# Patient Record
Sex: MALE | Race: WHITE
[De-identification: names, ages, dates, MRNs, and addresses within clinical notes are randomized per-mention and may not be internally consistent; named-entity substitution may affect disease eponyms.]

---

## 2020-07-21 ENCOUNTER — HOSPITAL ENCOUNTER (OUTPATIENT)
Dept: HOSPITAL 47 - EC | Age: 70
Setting detail: OBSERVATION
LOS: 2 days | Discharge: HOME | End: 2020-07-23
Attending: HOSPITALIST | Admitting: HOSPITALIST
Payer: MEDICARE

## 2020-07-21 DIAGNOSIS — I25.2: ICD-10-CM

## 2020-07-21 DIAGNOSIS — Z87.891: ICD-10-CM

## 2020-07-21 DIAGNOSIS — Z20.828: ICD-10-CM

## 2020-07-21 DIAGNOSIS — I25.10: ICD-10-CM

## 2020-07-21 DIAGNOSIS — I10: ICD-10-CM

## 2020-07-21 DIAGNOSIS — K21.9: ICD-10-CM

## 2020-07-21 DIAGNOSIS — E66.01: ICD-10-CM

## 2020-07-21 DIAGNOSIS — R91.8: ICD-10-CM

## 2020-07-21 DIAGNOSIS — E86.0: ICD-10-CM

## 2020-07-21 DIAGNOSIS — R91.1: ICD-10-CM

## 2020-07-21 DIAGNOSIS — R11.0: ICD-10-CM

## 2020-07-21 DIAGNOSIS — R42: Primary | ICD-10-CM

## 2020-07-21 DIAGNOSIS — I26.99: ICD-10-CM

## 2020-07-21 DIAGNOSIS — Z90.49: ICD-10-CM

## 2020-07-21 DIAGNOSIS — Z79.899: ICD-10-CM

## 2020-07-21 DIAGNOSIS — R61: ICD-10-CM

## 2020-07-21 DIAGNOSIS — Z79.82: ICD-10-CM

## 2020-07-21 DIAGNOSIS — R55: ICD-10-CM

## 2020-07-21 DIAGNOSIS — M10.9: ICD-10-CM

## 2020-07-21 LAB
ALBUMIN SERPL-MCNC: 4.3 G/DL (ref 3.5–5)
ALP SERPL-CCNC: 101 U/L (ref 38–126)
ALT SERPL-CCNC: 38 U/L (ref 4–49)
ANION GAP SERPL CALC-SCNC: 11 MMOL/L
APTT BLD: 22.7 SEC (ref 22–30)
AST SERPL-CCNC: 36 U/L (ref 17–59)
BASOPHILS # BLD AUTO: 0.1 K/UL (ref 0–0.2)
BASOPHILS NFR BLD AUTO: 1 %
BUN SERPL-SCNC: 11 MG/DL (ref 9–20)
CALCIUM SPEC-MCNC: 9 MG/DL (ref 8.4–10.2)
CHLORIDE SERPL-SCNC: 105 MMOL/L (ref 98–107)
CO2 SERPL-SCNC: 23 MMOL/L (ref 22–30)
EOSINOPHIL # BLD AUTO: 0.2 K/UL (ref 0–0.7)
EOSINOPHIL NFR BLD AUTO: 3 %
ERYTHROCYTE [DISTWIDTH] IN BLOOD BY AUTOMATED COUNT: 4.57 M/UL (ref 4.3–5.9)
ERYTHROCYTE [DISTWIDTH] IN BLOOD: 12.8 % (ref 11.5–15.5)
GLUCOSE SERPL-MCNC: 142 MG/DL (ref 74–99)
HCT VFR BLD AUTO: 42.6 % (ref 39–53)
HGB BLD-MCNC: 14.5 GM/DL (ref 13–17.5)
INR PPP: 1 (ref ?–1.2)
LYMPHOCYTES # SPEC AUTO: 2.7 K/UL (ref 1–4.8)
LYMPHOCYTES NFR SPEC AUTO: 36 %
MAGNESIUM SPEC-SCNC: 2 MG/DL (ref 1.6–2.3)
MCH RBC QN AUTO: 31.7 PG (ref 25–35)
MCHC RBC AUTO-ENTMCNC: 34 G/DL (ref 31–37)
MCV RBC AUTO: 93.2 FL (ref 80–100)
MONOCYTES # BLD AUTO: 0.6 K/UL (ref 0–1)
MONOCYTES NFR BLD AUTO: 8 %
NEUTROPHILS # BLD AUTO: 3.7 K/UL (ref 1.3–7.7)
NEUTROPHILS NFR BLD AUTO: 49 %
PH UR: 7 [PH] (ref 5–8)
PLATELET # BLD AUTO: 371 K/UL (ref 150–450)
POTASSIUM SERPL-SCNC: 4.5 MMOL/L (ref 3.5–5.1)
PROT SERPL-MCNC: 7.1 G/DL (ref 6.3–8.2)
PT BLD: 10 SEC (ref 9–12)
SODIUM SERPL-SCNC: 139 MMOL/L (ref 137–145)
SP GR UR: 1.01 (ref 1–1.03)
UROBILINOGEN UR QL STRIP: <2 MG/DL (ref ?–2)
WBC # BLD AUTO: 7.6 K/UL (ref 3.8–10.6)

## 2020-07-21 PROCEDURE — 36415 COLL VENOUS BLD VENIPUNCTURE: CPT

## 2020-07-21 PROCEDURE — 86140 C-REACTIVE PROTEIN: CPT

## 2020-07-21 PROCEDURE — 70450 CT HEAD/BRAIN W/O DYE: CPT

## 2020-07-21 PROCEDURE — 85025 COMPLETE CBC W/AUTO DIFF WBC: CPT

## 2020-07-21 PROCEDURE — 82550 ASSAY OF CK (CPK): CPT

## 2020-07-21 PROCEDURE — 71046 X-RAY EXAM CHEST 2 VIEWS: CPT

## 2020-07-21 PROCEDURE — 83615 LACTATE (LD) (LDH) ENZYME: CPT

## 2020-07-21 PROCEDURE — 93970 EXTREMITY STUDY: CPT

## 2020-07-21 PROCEDURE — 83735 ASSAY OF MAGNESIUM: CPT

## 2020-07-21 PROCEDURE — 96361 HYDRATE IV INFUSION ADD-ON: CPT

## 2020-07-21 PROCEDURE — 83690 ASSAY OF LIPASE: CPT

## 2020-07-21 PROCEDURE — 80053 COMPREHEN METABOLIC PANEL: CPT

## 2020-07-21 PROCEDURE — 85610 PROTHROMBIN TIME: CPT

## 2020-07-21 PROCEDURE — 82150 ASSAY OF AMYLASE: CPT

## 2020-07-21 PROCEDURE — 85652 RBC SED RATE AUTOMATED: CPT

## 2020-07-21 PROCEDURE — 96376 TX/PRO/DX INJ SAME DRUG ADON: CPT

## 2020-07-21 PROCEDURE — 84550 ASSAY OF BLOOD/URIC ACID: CPT

## 2020-07-21 PROCEDURE — 81003 URINALYSIS AUTO W/O SCOPE: CPT

## 2020-07-21 PROCEDURE — 96374 THER/PROPH/DIAG INJ IV PUSH: CPT

## 2020-07-21 PROCEDURE — 93880 EXTRACRANIAL BILAT STUDY: CPT

## 2020-07-21 PROCEDURE — 85379 FIBRIN DEGRADATION QUANT: CPT

## 2020-07-21 PROCEDURE — 93306 TTE W/DOPPLER COMPLETE: CPT

## 2020-07-21 PROCEDURE — 93005 ELECTROCARDIOGRAM TRACING: CPT

## 2020-07-21 PROCEDURE — 85730 THROMBOPLASTIN TIME PARTIAL: CPT

## 2020-07-21 PROCEDURE — 71275 CT ANGIOGRAPHY CHEST: CPT

## 2020-07-21 PROCEDURE — 84484 ASSAY OF TROPONIN QUANT: CPT

## 2020-07-21 PROCEDURE — 96372 THER/PROPH/DIAG INJ SC/IM: CPT

## 2020-07-21 PROCEDURE — 99285 EMERGENCY DEPT VISIT HI MDM: CPT

## 2020-07-21 PROCEDURE — 80048 BASIC METABOLIC PNL TOTAL CA: CPT

## 2020-07-21 NOTE — CT
EXAMINATION TYPE: CT brain wo con

 

DATE OF EXAM: 7/21/2020

 

COMPARISON: None

 

HISTORY: Dizziness and nausea.

 

CT DLP: 1098.4 mGycm

Automated exposure control for dose reduction was used.

 

There is cerebral cortical atrophy. There is no mass effect nor midline shift. There is no sign of in
tracranial hemorrhage. The calvarium is intact. There is no evidence of cerebral edema.

 

IMPRESSION:

Cerebral atrophy. No acute intracranial abnormality.

## 2020-07-21 NOTE — ED
General Adult HPI





- General


Chief complaint: Dizziness


Stated complaint: Dizziness,Nausea


Time Seen by Provider: 07/21/20 19:05


Source: patient, EMS, RN notes reviewed, old records reviewed


Mode of arrival: EMS


Limitations: no limitations





- History of Present Illness


Initial comments: 





This is a 70-year-old male who comes in stating he's been dizzy for the last 

couple weeks.  Patient states he is unsteady wasn't sure if it was going to pass

out.  Patient states he was recently admitted the hospital for heat exhaustion. 

Patient states he went home still hasn't been feeling well followed up with his 

doctor and continues to be dizzy and became dizzy and very nauseated.  Patient 

states he had no pain at any time.  Patient states he has had a couple episodes 

of significant diaphoresis.  Patient denies a headache.  Patient denies numbness

weakness.  Patient denies chest pain palpitations difficulty breathing first 

breath.  Patient denies abdominal pain.  Patient denies vomiting or diarrhea.  

Patient denies any recent fever chills or cough.  Patient denies any swelling to

the legs or calf tenderness.  Patient states while lying in bed it doesn't seem 

like he is that dizzy but when he sits up it does get dizzy 





- Related Data


                                    Allergies











Allergy/AdvReac Type Severity Reaction Status Date / Time


 


No Known Allergies Allergy   Verified 07/21/20 20:34














Review of Systems


ROS Statement: 


Those systems with pertinent positive or pertinent negative responses have been 

documented in the HPI.





ROS Other: All systems not noted in ROS Statement are negative.





Past Medical History


Past Medical History: Hypertension, Myocardial Infarction (MI)


History of Any Multi-Drug Resistant Organisms: None Reported


Past Surgical History: Appendectomy


Past Psychological History: No Psychological Hx Reported


Smoking Status: Former smoker


Past Alcohol Use History: None Reported


Past Drug Use History: None Reported





General Exam





- General Exam Comments


Initial Comments: 





GENERAL:


Patient is well-developed and well-nourished.  Patient is nontoxic and well-

hydrated and is in mild distress.





ENT:


Neck is soft and supple.  No significant lymphadenopathy is noted.  Oropharynx 

is clear.  Moist mucous membranes.  Neck has full range of motion without 

eliciting any pain.  





EYES:


The sclera were anicteric and conjunctiva were pink and moist.  Extraocular 

movements were intact and pupils were equal round and reactive to light.  

Eyelids were unremarkable.





PULMONARY:


Unlabored respirations.  Good breath sounds bilaterally.  No audible rales 

rhonchi or wheezing was noted.





CARDIOVASCULAR:


There is a regular rate and rhythm without any murmurs gallops or rubs.  





ABDOMEN:


Soft and nontender with normal bowel sounds.  





SKIN:


Skin is clear with no lesions or rashes and otherwise unremarkable.





NEUROLOGIC:


Patient is alert and oriented x3.  Cranial nerves II through XII are grossly 

intact.  Motor and sensory are also intact.  Normal speech, volume and content. 

Symmetrical smile.  





MUSCULOSKELETAL:


Normal extremities with adequate strength and full range of motion.  





LYMPHATICS:


No significant lymphadenopathy is noted





PSYCHIATRIC:


Normal psychiatric evaluation. 








Limitations: no limitations





Course


                                   Vital Signs











  07/21/20





  19:03


 


Temperature 98.8 F


 


Pulse Rate 78


 


Respiratory 18





Rate 


 


Blood Pressure 152/92


 


O2 Sat by Pulse 91 L





Oximetry 














Medical Decision Making





- Medical Decision Making





EKG shows a normal sinus rhythm at 76 bpm WV interval is on a 52 QRS is 90 QT 

intervals 46 QTC is 456.  There is no ST segment elevation or depression.





CT of the brain shows no acute normalities.  Chest x-ray shows no acute 

abnormality.





- Lab Data


Result diagrams: 


                                 07/21/20 19:27





                                 07/21/20 19:27


                                   Lab Results











  07/21/20 07/21/20 07/21/20 Range/Units





  19:27 19:27 19:27 


 


WBC  7.6    (3.8-10.6)  k/uL


 


RBC  4.57    (4.30-5.90)  m/uL


 


Hgb  14.5    (13.0-17.5)  gm/dL


 


Hct  42.6    (39.0-53.0)  %


 


MCV  93.2    (80.0-100.0)  fL


 


MCH  31.7    (25.0-35.0)  pg


 


MCHC  34.0    (31.0-37.0)  g/dL


 


RDW  12.8    (11.5-15.5)  %


 


Plt Count  371    (150-450)  k/uL


 


Neutrophils %  49    %


 


Lymphocytes %  36    %


 


Monocytes %  8    %


 


Eosinophils %  3    %


 


Basophils %  1    %


 


Neutrophils #  3.7    (1.3-7.7)  k/uL


 


Lymphocytes #  2.7    (1.0-4.8)  k/uL


 


Monocytes #  0.6    (0-1.0)  k/uL


 


Eosinophils #  0.2    (0-0.7)  k/uL


 


Basophils #  0.1    (0-0.2)  k/uL


 


PT   10.0   (9.0-12.0)  sec


 


INR   1.0   (<1.2)  


 


APTT   22.7   (22.0-30.0)  sec


 


Sodium    139  (137-145)  mmol/L


 


Potassium    4.5  (3.5-5.1)  mmol/L


 


Chloride    105  ()  mmol/L


 


Carbon Dioxide    23  (22-30)  mmol/L


 


Anion Gap    11  mmol/L


 


BUN    11  (9-20)  mg/dL


 


Creatinine    0.89  (0.66-1.25)  mg/dL


 


Est GFR (CKD-EPI)AfAm    >90  (>60 ml/min/1.73 sqM)  


 


Est GFR (CKD-EPI)NonAf    87  (>60 ml/min/1.73 sqM)  


 


Glucose    142 H  (74-99)  mg/dL


 


Calcium    9.0  (8.4-10.2)  mg/dL


 


Magnesium    2.0  (1.6-2.3)  mg/dL


 


Total Bilirubin    0.5  (0.2-1.3)  mg/dL


 


AST    36  (17-59)  U/L


 


ALT    38  (4-49)  U/L


 


Alkaline Phosphatase    101  ()  U/L


 


Troponin I     (0.000-0.034)  ng/mL


 


Total Protein    7.1  (6.3-8.2)  g/dL


 


Albumin    4.3  (3.5-5.0)  g/dL














  07/21/20 Range/Units





  19:27 


 


WBC   (3.8-10.6)  k/uL


 


RBC   (4.30-5.90)  m/uL


 


Hgb   (13.0-17.5)  gm/dL


 


Hct   (39.0-53.0)  %


 


MCV   (80.0-100.0)  fL


 


MCH   (25.0-35.0)  pg


 


MCHC   (31.0-37.0)  g/dL


 


RDW   (11.5-15.5)  %


 


Plt Count   (150-450)  k/uL


 


Neutrophils %   %


 


Lymphocytes %   %


 


Monocytes %   %


 


Eosinophils %   %


 


Basophils %   %


 


Neutrophils #   (1.3-7.7)  k/uL


 


Lymphocytes #   (1.0-4.8)  k/uL


 


Monocytes #   (0-1.0)  k/uL


 


Eosinophils #   (0-0.7)  k/uL


 


Basophils #   (0-0.2)  k/uL


 


PT   (9.0-12.0)  sec


 


INR   (<1.2)  


 


APTT   (22.0-30.0)  sec


 


Sodium   (137-145)  mmol/L


 


Potassium   (3.5-5.1)  mmol/L


 


Chloride   ()  mmol/L


 


Carbon Dioxide   (22-30)  mmol/L


 


Anion Gap   mmol/L


 


BUN   (9-20)  mg/dL


 


Creatinine   (0.66-1.25)  mg/dL


 


Est GFR (CKD-EPI)AfAm   (>60 ml/min/1.73 sqM)  


 


Est GFR (CKD-EPI)NonAf   (>60 ml/min/1.73 sqM)  


 


Glucose   (74-99)  mg/dL


 


Calcium   (8.4-10.2)  mg/dL


 


Magnesium   (1.6-2.3)  mg/dL


 


Total Bilirubin   (0.2-1.3)  mg/dL


 


AST   (17-59)  U/L


 


ALT   (4-49)  U/L


 


Alkaline Phosphatase   ()  U/L


 


Troponin I  <0.012  (0.000-0.034)  ng/mL


 


Total Protein   (6.3-8.2)  g/dL


 


Albumin   (3.5-5.0)  g/dL














Disposition


Clinical Impression: 


 Near syncope, Diaphoresis





Disposition: ADMITTED AS IP TO THIS HOSP


Referrals: 


Nadine Stovall DO [Primary Care Provider] - 1-2 days


Time of Disposition: 20:31

## 2020-07-21 NOTE — XR
EXAMINATION TYPE: XR chest 2V

 

DATE OF EXAM: 7/21/2020

 

COMPARISON: NONE

 

HISTORY: Dizziness and nausea

 

TECHNIQUE:

 

FINDINGS: Heart and mediastinum are normal. There is some linear density in the left midlung. This is
 probably calcified cartilage. The other lung fields are clear. There are no hilar masses. There is n
o pulmonary consolidation. There is no pleural effusion. Bony thorax is intact..

 

IMPRESSION: No active cardiopulmonary disease. Normal heart.

## 2020-07-22 LAB
AMYLASE SERPL-CCNC: 49 U/L (ref 30–110)
ANION GAP SERPL CALC-SCNC: 8 MMOL/L
BASOPHILS # BLD AUTO: 0.1 K/UL (ref 0–0.2)
BASOPHILS NFR BLD AUTO: 1 %
BUN SERPL-SCNC: 12 MG/DL (ref 9–20)
CALCIUM SPEC-MCNC: 8.8 MG/DL (ref 8.4–10.2)
CHLORIDE SERPL-SCNC: 104 MMOL/L (ref 98–107)
CK SERPL-CCNC: 51 U/L (ref 55–170)
CO2 SERPL-SCNC: 25 MMOL/L (ref 22–30)
EOSINOPHIL # BLD AUTO: 0.2 K/UL (ref 0–0.7)
EOSINOPHIL NFR BLD AUTO: 3 %
ERYTHROCYTE [DISTWIDTH] IN BLOOD BY AUTOMATED COUNT: 4.35 M/UL (ref 4.3–5.9)
ERYTHROCYTE [DISTWIDTH] IN BLOOD: 12.8 % (ref 11.5–15.5)
GLUCOSE SERPL-MCNC: 172 MG/DL (ref 74–99)
HCT VFR BLD AUTO: 42 % (ref 39–53)
HGB BLD-MCNC: 13.8 GM/DL (ref 13–17.5)
LDH SPEC-CCNC: 447 U/L (ref 313–618)
LYMPHOCYTES # SPEC AUTO: 1.9 K/UL (ref 1–4.8)
LYMPHOCYTES NFR SPEC AUTO: 33 %
MCH RBC QN AUTO: 31.8 PG (ref 25–35)
MCHC RBC AUTO-ENTMCNC: 32.9 G/DL (ref 31–37)
MCV RBC AUTO: 96.5 FL (ref 80–100)
MONOCYTES # BLD AUTO: 0.5 K/UL (ref 0–1)
MONOCYTES NFR BLD AUTO: 9 %
NEUTROPHILS # BLD AUTO: 3.1 K/UL (ref 1.3–7.7)
NEUTROPHILS NFR BLD AUTO: 52 %
PLATELET # BLD AUTO: 339 K/UL (ref 150–450)
POTASSIUM SERPL-SCNC: 4.3 MMOL/L (ref 3.5–5.1)
SODIUM SERPL-SCNC: 137 MMOL/L (ref 137–145)
URATE SERPL-MCNC: 4.8 MG/DL (ref 3.5–8.5)
WBC # BLD AUTO: 5.8 K/UL (ref 3.8–10.6)

## 2020-07-22 RX ADMIN — PANTOPRAZOLE SODIUM SCH MG: 40 INJECTION, POWDER, FOR SOLUTION INTRAVENOUS at 00:09

## 2020-07-22 RX ADMIN — RIVAROXABAN SCH MG: 15 TABLET, FILM COATED ORAL at 14:18

## 2020-07-22 RX ADMIN — ASPIRIN 81 MG CHEWABLE TABLET SCH MG: 81 TABLET CHEWABLE at 10:46

## 2020-07-22 RX ADMIN — CEFAZOLIN SCH MLS/HR: 330 INJECTION, POWDER, FOR SOLUTION INTRAMUSCULAR; INTRAVENOUS at 10:46

## 2020-07-22 RX ADMIN — PANTOPRAZOLE SODIUM SCH MG: 40 INJECTION, POWDER, FOR SOLUTION INTRAVENOUS at 10:46

## 2020-07-22 RX ADMIN — RIVAROXABAN SCH: 15 TABLET, FILM COATED ORAL at 21:29

## 2020-07-22 RX ADMIN — CEFAZOLIN SCH MLS/HR: 330 INJECTION, POWDER, FOR SOLUTION INTRAMUSCULAR; INTRAVENOUS at 21:35

## 2020-07-22 RX ADMIN — CEFAZOLIN SCH MLS/HR: 330 INJECTION, POWDER, FOR SOLUTION INTRAMUSCULAR; INTRAVENOUS at 00:08

## 2020-07-22 NOTE — P.CNNES
History of Present Illness


Consult date: 07/22/20


Requesting physician: Dougie Crook


Reason for Consult: Near syncope


History of Present Illness: 





Patient is a 70-year-old male who came to the hospital because he has been 

feeling dizzy for the last couple weeks.  Patient states that about couple weeks

ago he was overheated.  He did not seek any medical attention for that.  He is a

, was on the road for 10 days.  Since that episode of overheating, 

he has not been feeling well, felt woozy, felt "blah", and as if would blackout.

 Also has been feeling nauseous.  As his symptoms persisted, he decided to come 

to the ER.  His blood pressure on arrival was 152/92, pulse rate 78, temperature

98.8.





Patient underwent CT head showed cerebral atrophy with no acute intracranial 

abnormality.  Chest x-ray showed no active cardiopulmonary disease.  Normal 

heart.  2-D echo showed sinus rhythm, morbid obesity.  Normal liver left-

ventricular size.  Mild concentric LVH.  EF is 50-55%.  Right ventricle is 

normal in size.  Left atrial size is normal.  Aortic valve was not well-

visualized.  Carotid Doppler showed bilateral intimal thickening with no 

significant hemodynamic stenosis.  Antegrade flow in both vertebral arteries.  

CT of the chest showed a filling defect within the left upper lobe pulmonary 

arterial branches suspicious for a small pulmonary embolism.  There is a mass in

the right upper lobe suspicious for malignancy 1.4 cm.  Recommend PET scan in 

pulmonary consultation.  I'd hilar adenopathy noted.  Additional 2 mm no 

unilateral segment right upper lobe.  COPD.  Venous Doppler of bilateral lower 

limbs negative for DVT.  EKG shows normal sinus rhythm with left axis deviation.

 Patient has been seen by pulmonary, who are recommending Xarelto for PE and 

also recommending outpatient PET scan for lung mass.





Patient denies diabetes.  He has hypertension.  He has smoked 1 pack per day for

10-15 years, quit 34 years ago.





Review of Systems





Completely unremarkable except as mentioned above.  All 14 point of review of 

systems unremarkable.  Patient denies any slurred speech facial droop, double 

vision, loss of vision or blurred vision.  Denies any focal numbness tingling, 

focal weakness.  No syncopal spell.  No seizure.





Past Medical History


Past Medical History: Hypertension, Myocardial Infarction (MI)


Additional Past Medical History / Comment(s): Gout


Last Myocardial Infarction Date:: 7\21\2014


History of Any Multi-Drug Resistant Organisms: None Reported


Past Surgical History: Appendectomy


Additional Past Surgical History / Comment(s): pt states appendectomy 30-40 

years ago.  pt states he doesn't know when his last MI was, because he was in 

retirement at the time he also said it was about 6 years ago.


Past Psychological History: No Psychological Hx Reported


Smoking Status: Former smoker


Past Alcohol Use History: None Reported


Past Drug Use History: None Reported





Medications and Allergies


                                Home Medications











 Medication  Instructions  Recorded  Confirmed  Type


 


Allopurinol [Zyloprim] 300 mg PO DAILY 07/21/20 07/21/20 History


 


Aspirin EC [Ecotrin Low Dose] 81 mg PO DAILY 07/21/20 07/21/20 History


 


Omeprazole Magnesium [PriLOSEC OTC] 20 mg PO DAILY 07/21/20 07/21/20 History


 


amLODIPine [Norvasc] 5 mg PO DAILY 07/21/20 07/21/20 History








                                    Allergies











Allergy/AdvReac Type Severity Reaction Status Date / Time


 


No Known Allergies Allergy   Verified 07/21/20 20:34














Physical Examination





- Vital Signs


Vital Signs: 


                                   Vital Signs











  Temp Pulse Pulse Resp BP BP BP


 


 07/22/20 15:00  98.9 F   87  16   149/88 


 


 07/22/20 08:11    74  16   


 


 07/22/20 07:49  98.1 F   74  16   157/91 


 


 07/22/20 03:00    76    


 


 07/22/20 02:29  98 F   76    138/78 


 


 07/21/20 23:23        163/90


 


 07/21/20 22:05  97.5 F L   86    158/92 


 


 07/21/20 21:06  98.4 F  76   18  146/86  


 


 07/21/20 19:03  98.8 F  78   18  152/92  














  BP BP Pulse Ox


 


 07/22/20 15:00    96


 


 07/22/20 08:11   


 


 07/22/20 07:49    98


 


 07/22/20 03:00   


 


 07/22/20 02:29    98


 


 07/21/20 23:23  135/84  151/82 


 


 07/21/20 22:05    97


 


 07/21/20 21:06    98


 


 07/21/20 19:03    91 L








                                Intake and Output











 07/22/20 07/22/20 07/22/20





 06:59 14:59 22:59


 


Output Total 1100  1500


 


Balance -1100  -1500


 


Output:   


 


  Urine 1100  1500


 


Other:   


 


  Voiding Method Toilet Toilet Toilet


 


  # Voids 1  1














On examination patient is an elderly  male, in no acute distress.  Breonna

ent is alert and awake oriented to time place and person.  Speech and language 

functions are normal.  Attention and concentration, fund of knowledge is 

adequate.  On cranial nerve examination pupils are round and reacting to light. 

Visual fields are full.  Extraocular muscles are intact with no nystagmus.  Face

is symmetric, tongue protrudes the midline.  Palatal elevation and sensation 

normal.  Hearing and shoulder shrug normal.  On muscle strength testing there is

no pronator drift and the strength is normal in arms and legs distally and 

proximally.  Reflexes are 1+ and plantars downgoing.  Sensory touch is equal.  

No ataxia for finger-to-nose testing.  Tone and bulk of muscles normal.  Gait 

normal.  No carotid bruit, S1 and S2 audible.  Abdomen soft nontender, chest is 

clear.





Results





- Laboratory Findings


CBC and BMP: 


                                 07/22/20 06:06





                                 07/22/20 06:06


Abnormal Lab Findings: 


                                  Abnormal Labs











  07/21/20 07/22/20 07/22/20





  19:27 06:06 06:06


 


ESR   22 H 


 


D-Dimer   


 


Glucose  142 H   172 H


 


Creatine Kinase    51 L














  07/22/20





  06:06


 


ESR 


 


D-Dimer  1.19 H


 


Glucose 


 


Creatine Kinase 














Assessment and Plan


Assessment: 





* Dizziness, with near syncopal spell.


* Pulmonary embolism


* Lung mass


* Obesity.


Plan: 





* Patient had a normal carotid Doppler and computed tomography scan of the head.

   No further workup indicated.  Patient's neurological examination is normal.


* Patient has been started on Xarelto for PE.


* Patient undergoing placement of event monitor to rule out arrhythmia.


* Neurology will sign off.  Please call neurology if any other concerns.

## 2020-07-22 NOTE — CT
EXAMINATION TYPE: CT angio chest

 

DATE OF EXAM: 7/22/2020 9:12 AM

 

COMPARISON:

 

HISTORY: Elevated d-dimer

 

CT DLP: 544.4 mGycm

Automated exposure control for dose reduction was used.

 

CONTRAST: 

CTA scan of the thorax is performed with IV Contrast, patient injected with 100 mL of Isovue 370, pul
monary embolism protocol. .  

 

FINDINGS:

 

LUNGS: Biapical pleural thickening and diffuse emphysematous changes. There is a suspicious mass in t
he right upper lobe on axial image 45 measuring 1.4 cm. Additional nodule right upper lobe axial imag
e 79 measuring 2 mm no pleural effusion or pneumothorax. No overt failure. Groundglass changes poster
iorly most typical of atelectasis. This

 

MEDIASTINUM: There is pathologic adenopathy in the right hilum measuring short axis of 1.4 cm. Dooley
ry artery calcification is seen in the heart is enlarged. Atherosclerotic change of the aorta but no 
evidence of aneurysm. Trace of pericardial fluid noted.

 

Assessment of the pulmonary arteries is somewhat limited due to artifact. Within the left upper lobe 
branch there is incomplete filling of the pulmonary artery distally suggestive of a small left upper 
lobe pulmonary embolism.

 

OTHER:  Hypertrophic and degenerative change of the spine. No obvious destructive changes. Low attenu
ation in the liver and suggestive of fatty infiltration. There is a small hiatal hernia. Right upper 
pole right renal lesion measures 10 Hounsfield units suggestive of a cyst.

 

 

 

IMPRESSION:

1. There is a filling defect within a left upper lobe pulmonary arterial branch suspicious for a smal
l pulmonary embolism correlate clinically.

2. There is a mass in the right upper lobe suspicious for malignancy measuring 1.4 cm. Recommend PET 
scan and pulmonology consultation. Right hilar adenopathy noted.

3. Additional 2 mm nodule lateral segment right upper lobe.

4. COPD.

## 2020-07-22 NOTE — PN
PROGRESS NOTE



DATE OF SERVICE:

07/22/2020



This 70-year-old gentleman who was admitted with dizziness and nausea was thought to

have some dehydration also.  The patient had multiple evaluations and a 2D echo with

Doppler was done today that showed ejection fraction about 50% to 55% and no other

major abnormalities.  The patient had a carotid Doppler which showed bilateral intimal

thickening with no hemodynamically significant stenosis. A chest CT was also done which

showed evidence of a filling defect in the left upper lobe pulmonary artery branch

suspicious for a small pulmonary embolism and a mass in the right upper lobe suspicious

for malignancy measuring about 1.4 cm. A 2 mm nodule in the lateral segment of the

right upper lobe was also noted. COPD was also re-demonstrated.  A venous study in the

lower legs was done which was negative for DVT.  The patient was also seen by

Cardiology as well as Pulmonology. Dr. Samaniego has recommended PET scan in the

outpatient setting and possibly navigational bronchoscopy and biopsy.  The basic labs:

ESR is 22. D-dimer was 1.19. Uric acid was 4.8, glucose 172. The CRP was only 6.9.

Amylase and lipase were also normal. Troponins are negative. LDH was only 447, which is

within normal limits. Past medical history reviewed.



REVIEW OF SYSTEMS:

CARDIOVASCULAR SYSTEM: No angina, palpitations.

RESPIRATORY SYSTEM: As mentioned earlier.

GI: Nausea is slightly better.

NERVOUS SYSTEM: As mentioned earlier. Still weak.



CURRENT MEDICATIONS:

1. Tylenol.

2. Norco.

3. Zyloprim.

4. Xanax.

5. Norvasc.

6. Aspirin.

7. Protonix.

8. Xarelto.

9. Restoril.



PHYSICAL EXAMINATION:

Patient is alert, oriented x3.  Pulse 75, blood pressure 157/91, respirations 16,

temperature 98.1, pulse ox 98% on room air.

HEENT: Conjunctivae normal.

NECK: No jugular venous distention.

CARDIOVASCULAR SYSTEM:  S1, S2 muffled.

RESPIRATORY SYSTEM: Breath sounds diminished at the bases.  A few scattered rhonchi.

ABDOMEN: Soft, non-tender.

NERVOUS SYSTEM: No focal deficit.



LABS:

CBC within normal limits.  ESR is 22.  D-dimer is 1.19 and glucose is 172. Troponins

are negative. Creatine kinase was normal.  LDH was normal.



ASSESSMENT:

1. Dizziness and nausea of undetermined etiology.

2. Small pulmonary embolism in the left upper lobe, pulmonary artery branch.

3. Mass in the right upper lobe suspicious for malignancy measuring 1.4 cm; in

    addition, a 2 mm nodule in the lateral segment of the right upper lobe.

4. Rule out COVID-19.

5. Hypertension.

6. History of myocardial infarction.

7. History of appendectomy.

8. Remote history of nicotine dependence.

9. Obesity with body mass index of 38.7.

10.Possible dehydration, present on admission.

11.History of recent heat exhaustion.



RECOMMENDATIONS AND DISCUSSION:

In this 70-year-old gentleman who presented with multiple complex medical issues, we

will monitor the patient closely, continue the current medications, continue

symptomatic treatment.  Xarelto has been initiated.  We will continue the IV fluids at

this time.  Repeat labs in the morning.  Increase ambulation.  Otherwise, continue to

follow with multiple consultants, including Neurology, Pulmonology and Cardiology. The

prognosis is guarded because of multiple complex medical issues. Further

recommendations to follow.





MMODL / IJN: [id number] / Job#: [id number]

## 2020-07-22 NOTE — HP
HISTORY AND PHYSICAL



DATE OF SERVICE:

07/21/2020



CHIEF COMPLAINT:

Dizziness and nausea.



HISTORY OF PRESENT ILLNESS:

This 70-year-old gentleman with a past medical history of multiple medical problems

including hypertension, myocardial infarction, appendectomy, remote history of nicotine

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

.  The patient apparently drives out of state.  The patient went to [place].

About 2 weeks ago, the patient had episode of heat exhaustion and was admitted in the

hospital, but subsequently patient also recently went to [place] and went to [facility] and recently came back like 2 days ago.  The patient was feeling more dizzy and

nauseous and was unable to keep anything down and because of weakness and patient also

had a couple episodes of significant diaphoresis, patient came to [facility]

and admitted for further evaluation and treatment.  There is no history of chest pain,

palpitation. No history of cough, sputum.  No history of any contact with any ill

individuals even though patient has been to [place].  The patient reports the patient

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



PAST MEDICAL HISTORY:

Hypertension, myocardial infarction, appendectomy.



MEDICATIONS:

Medications prior to admission include:

1. Norvasc 5 mg p.o. daily.

2. Prilosec 20 mg p.o. daily.

3. Ecotrin 81 mg.

4. Zyloprim 300 mg daily.



ALLERGIES:

None.



FAMILY HISTORY:

No history of heart disease or strokes in the family.



SOCIAL HISTORY:

Previous history of smoking. No history of alcohol intake.



REVIEW OF SYSTEMS:

ENT: No diminished hearing or diminished vision

CARDIOVASCULAR SYSTEM: As mentioned earlier.

RESPIRATORY SYSTEM:  As mentioned earlier.

GI: No nausea.

: No dysuria.

NERVOUS SYSTEM: No numbness or weakness.

ALLERGY/IMMUNOLOGY: No asthma.

MUSCULOSKELETAL: As mentioned earlier.

HEMATOLOGY/ONCOLOGY: No history of anemia.

ENDOCRINE:  No history of diabetes or hypothyroidism.

CONSTITUTIONAL: As mentioned earlier.

DERMATOLOGY: Negative.

RHEUMATOLOGY: Negative.

PSYCHIATRY: As mentioned earlier.



PHYSICAL EXAMINATION:

The patient is alert and oriented x3. Pulse 86, blood pressure is 158/92, respiration

18, temperature 97.4, pulse ox 97% on 2 L.

HEENT:  Conjunctivae normal.

NECK: No jugular venous distention.

CARDIOVASCULAR: S1, S2 muffled.

RESPIRATORY:  Breath sounds diminished at the bases. No rhonchi, no crackles.

ABDOMEN:  Soft, nontender.

LEGS: No edema, no swelling.

NERVOUS SYSTEM: Higher functions as mentioned. Moves all 4 limbs. No focal motor or

sensory deficit.

LYMPHATICS:  No lymphadenopathy of the neck, axillae or groin.

SKIN:  No ulcer, rash or bleeding.

JOINTS: No active deforming arthropathy.



LABS:

CBC within normal limits.  Glucose 142.



ASSESSMENT:

1. Dizziness and nausea for evaluation, rule out acute coronary syndrome.

2. Rule out COVID-19.

3. Hypertension.

4. History of myocardial infarction.

5. History of appendectomy.

6. Remote history of nicotine dependence.

7. Obesity with body mass of 38.7.



RECOMMENDATIONS AND DISCUSSION:

This 70-year-old gentleman who presented with multiple complex medical issues, will

monitor the patient closely. Continue the current medications. Continues symptomatic

treatment. Will obtain cardiology consultation.  Otherwise, IV fluids. COVID-19 as

requested.  Resume the home medications.  Symptomatic treatment.  Prognosis guarded

because of multiple complex medical issues. Further recommendations to follow. A copy

of dictation forwarded to Dr. Stovall, who is the primary physician.





MMODL / IJN: [id number] / Job#: [id number]

## 2020-07-22 NOTE — P.CRDCN
History of Present Illness


History of present illness: 





 


HISTORY OF PRESENTING ILLNESS


This is a pleasant 70-year-old  male past medical history significant 

for coronary artery disease according to the patient he had a cardiac catheteri

zation in [place] about 6 years ago he was told he had a myocardial infarction 

but had collateral circulation.  He also has been diagnosed with hypertension in

the past.  He does not follow regularly in the office with a cardiologist. We 

have been asked to see in consultation for syncope.  He states approximately 2 

weeks ago he had an episode of feeling overheated. He was dizzy and light headed

for no particular reason. Since that time he has been feeling fatigued and 

having persistent dizziness when he changes positions. No chest pain, shortness 

of breath, palpitations, diaphoresis or nausea. Yesterday he had another episode

of feeling dizzy and lightheaded and he changes positions.  He has had no loss 

of consciousness or syncopal episodes.  He works as an over the road truck 

.  He states his symptoms he is experiencing are similar to what he 

experienced 6 years ago when he had a myocardial infarction.





DIAGNOSTICS


EKG reveals sinus mechanism with left axis deviation.


Chest xray negative for an acute cardiopulmonary process.


CT of the brain is negative for an acute intracranial process.


Laboratory reviewed, CBC unremarkable, d-dimer 1.19, sodium 137, potassium 4.3, 

creatinine 0.97, magnesium 2.0, cardiac enzymes negative 3.


Current cardiac medications include amlodipine 5 mg daily and aspirin 81 mg 

daily. 





REVIEW OF SYSTEMS


At the time of my exam:


CONSTITUTIONAL: Denies fever or chills.


CARDIOVASCULAR: Denies chest pain, shortness of breath, orthopnea, PND or 

palpitations.


RESPIRATORY: Denies cough. 


GASTROINTESTINAL: Denies abdominal pain, diarrhea, constipation, nausea or 

vomiting.


MUSCULOSKELETAL: Denies myalgias.


NEUROLOGIC: Denies numbness, tingling or weakness.


ENDOCRINE: Denies fatigue, weight change,  polydipsia or polyurina.


GENITOURINARY: Denies burning, hematuria or urgency with micturation.


HEMATOLOGIC: Denies history of anemia or bleeding. 





PHYSICAL EXAMINATION


Blood pressure 157/91 heart rate 74 afebrile and maintaining oxygen saturation 

on room air.


CONSTITUTIONAL: No apparent distress.  Obese.


HEENT: Head is normocephalic. Pupils are equal, round. Sclerae anicteric. Mucous

membranes of the mouth are moist.  No JVD. No carotid bruit.


CHEST EXAMINATION: Lungs are clear to auscultation. No chest wall tenderness is 

noted on palpation or with deep breathing. 


HEART EXAMINATION: Regular rate and rhythm. S1, S2 heard. No murmurs, gallops or

rub.


ABDOMEN: Soft, nontender. Positive bowel sounds.


EXTREMITIES: 2+ peripheral pulses, no lower extremity edema and no calf 

tenderness.


NEUROLOGIC EXAMINATION: Patient is awake, alert and oriented x3. 





ASSESSMENT


Dizziness


History of myocardial infarction


Hypertension


Obesity, BMI 38





PLAN


Acute coronary event has been ruled out.


D-dimer is elevated we will obtain a CT angios the chest to rule out pulmonary 

embolism.


If negative for pulmonary embolism we will proceed with a Lexiscan stress test 

to assess for reversible cardiac ischemia.


Echocardiogram has been ordered and will reviewed.


Thank you kindly for this consultation.








Nurse Practitioner note has been reviewed, I agree with a documented findings 

and plan of care.  Patient was seen and examined.











Past Medical History


Past Medical History: Hypertension, Myocardial Infarction (MI)


Last Myocardial Infarction Date:: 7\21\2014


History of Any Multi-Drug Resistant Organisms: None Reported


Past Surgical History: Appendectomy


Additional Past Surgical History / Comment(s): pt states appendectomy 30-40 

years ago.  pt states he doesn't know when his last MI was, because he was in 

[place] at the time he also said it was about 6 years ago.


Past Psychological History: No Psychological Hx Reported


Smoking Status: Former smoker


Past Alcohol Use History: None Reported


Past Drug Use History: None Reported





Medications and Allergies


                                Home Medications











 Medication  Instructions  Recorded  Confirmed  Type


 


Allopurinol [Zyloprim] 300 mg PO DAILY 07/21/20 07/21/20 History


 


Aspirin EC [Ecotrin Low Dose] 81 mg PO DAILY 07/21/20 07/21/20 History


 


Omeprazole Magnesium [PriLOSEC OTC] 20 mg PO DAILY 07/21/20 07/21/20 History


 


amLODIPine [Norvasc] 5 mg PO DAILY 07/21/20 07/21/20 History








                                    Allergies











Allergy/AdvReac Type Severity Reaction Status Date / Time


 


No Known Allergies Allergy   Verified 07/21/20 20:34














Physical Exam


Vitals: 


                                   Vital Signs











  Temp Pulse Pulse Resp BP BP BP


 


 07/22/20 07:49  98.1 F   74  16   157/91 


 


 07/22/20 03:00    76    


 


 07/22/20 02:29  98 F   76    138/78 


 


 07/21/20 23:23        163/90


 


 07/21/20 22:05  97.5 F L   86    158/92 


 


 07/21/20 21:06  98.4 F  76   18  146/86  


 


 07/21/20 19:03  98.8 F  78   18  152/92  














  BP BP Pulse Ox


 


 07/22/20 07:49    98


 


 07/22/20 03:00   


 


 07/22/20 02:29    98


 


 07/21/20 23:23  135/84  151/82 


 


 07/21/20 22:05    97


 


 07/21/20 21:06    98


 


 07/21/20 19:03    91 L








                                Intake and Output











 07/21/20 07/22/20 07/22/20





 22:59 06:59 14:59


 


Output Total  1100 


 


Balance  -1100 


 


Output:   


 


  Urine  1100 


 


Other:   


 


  Voiding Method  Toilet 


 


  # Voids  1 


 


  Weight 122.47 kg  














Results





                                 07/22/20 06:06





                                 07/22/20 06:06


                                 Cardiac Enzymes











  07/21/20 07/21/20 07/21/20 Range/Units





  19:27 19:27 21:19 


 


AST  36    (17-59)  U/L


 


Lactate Dehydrogenase     (313-618)  U/L


 


Troponin I   <0.012  <0.012  (0.000-0.034)  ng/mL














  07/22/20 07/22/20 Range/Units





  00:40 06:06 


 


AST    (17-59)  U/L


 


Lactate Dehydrogenase   447  (313-618)  U/L


 


Troponin I  <0.012   (0.000-0.034)  ng/mL








                                   Coagulation











  07/21/20 Range/Units





  19:27 


 


PT  10.0  (9.0-12.0)  sec


 


APTT  22.7  (22.0-30.0)  sec








                                       CBC











  07/21/20 07/22/20 Range/Units





  19:27 06:06 


 


WBC  7.6  5.8  (3.8-10.6)  k/uL


 


RBC  4.57  4.35  (4.30-5.90)  m/uL


 


Hgb  14.5  13.8  (13.0-17.5)  gm/dL


 


Hct  42.6  42.0  (39.0-53.0)  %


 


Plt Count  371  339  (150-450)  k/uL








                          Comprehensive Metabolic Panel











  07/21/20 07/22/20 Range/Units





  19:27 06:06 


 


Sodium  139  137  (137-145)  mmol/L


 


Potassium  4.5  4.3  (3.5-5.1)  mmol/L


 


Chloride  105  104  ()  mmol/L


 


Carbon Dioxide  23  25  (22-30)  mmol/L


 


BUN  11  12  (9-20)  mg/dL


 


Creatinine  0.89  0.97  (0.66-1.25)  mg/dL


 


Glucose  142 H  172 H  (74-99)  mg/dL


 


Calcium  9.0  8.8  (8.4-10.2)  mg/dL


 


AST  36   (17-59)  U/L


 


ALT  38   (4-49)  U/L


 


Alkaline Phosphatase  101   ()  U/L


 


Total Protein  7.1   (6.3-8.2)  g/dL


 


Albumin  4.3   (3.5-5.0)  g/dL








                               Current Medications











Generic Name Dose Route Start Last Admin





  Trade Name Freq  PRN Reason Stop Dose Admin


 


Acetaminophen  500 mg  07/21/20 23:27 





  Tylenol Tab  PO  





  Q6HR PRN  





  Fever and/ or Pain  


 


Hydrocodone Bitart/Acetaminophen  1 each  07/22/20 00:00 





  [place] 5-325  PO  





  Q6HR PRN  





  Pain  


 


Allopurinol  300 mg  07/22/20 09:00 





  Zyloprim  PO  





  DAILY [facility]  


 


Alprazolam  0.25 mg  07/21/20 23:27 





  Xanax  PO  





  TID PRN  





  Anxiety  


 


Amlodipine Besylate  5 mg  07/22/20 09:00 





  Norvasc  PO  





  DAILY [facility]  


 


Aspirin  81 mg  07/22/20 09:00 





  Aspirin  PO  





  DAILY [facility]  


 


Heparin Sodium (Porcine)  5,000 unit  07/22/20 09:00 





  Heparin  SQ  





  Q12HR [facility]  


 


Sodium Chloride  1,000 mls @ 100 mls/hr  07/21/20 23:30  07/22/20 00:08





  Saline 0.9%  IV   100 mls/hr





  .Q10H [facility]   Administration


 


Pantoprazole Sodium  40 mg  07/21/20 23:30  07/22/20 00:09





  Protonix  IVP   40 mg





  DAILY [facility]   Administration


 


Temazepam  15 mg  07/21/20 23:27 





  Restoril  PO  





  HS PRN  





  Insomnia  








                                Intake and Output











 07/21/20 07/22/20 07/22/20





 22:59 06:59 14:59


 


Output Total  1100 


 


Balance  -1100 


 


Output:   


 


  Urine  1100 


 


Other:   


 


  Voiding Method  Toilet 


 


  # Voids  1 


 


  Weight 122.47 kg  








                                        





                                 07/22/20 06:06 





                                 07/22/20 06:06

## 2020-07-22 NOTE — US
EXAMINATION TYPE: US venous doppler duplex LE 

 

DATE OF EXAM: 7/22/2020 10:47 AM

 

COMPARISON: CT chest

 

CLINICAL HISTORY: pe, assess for dvt.

 

SIDE PERFORMED: Bilateral  

 

TECHNIQUE:  The lower extremity deep venous system is examined utilizing real time linear array sonog
carolynn with graded compression, doppler sonography and color-flow sonography.

 

VESSELS IMAGED:

 

Common Femoral Vein

Deep Femoral Vein

Greater Saphenous Vein *

Femoral Vein

Popliteal Vein

Small Saphenous Vein *

Proximal Calf Veins

(* superficial vessels)

 

 

 

Right Leg:  Negative for DVT

 

Left Leg:  Negative for DVT

 

 

 

IMPRESSION:  Grayscale, color doppler, spectral doppler imaging performed of the deep veins of the lo
wer extremities.  There is normal flow, compressibility, vascular waveforms.

## 2020-07-22 NOTE — P.CNPUL
History of Present Illness


Consult date: 07/22/20


Requesting physician: Dougie Crook


Reason for consult: dyspnea, abnormal CXR/CT


Chief complaint: Shortness of breath, weakness


History of present illness: 





This is a very pleasant 70-year-old gentleman who follows with Dr. Garcia as 

his primary care provider.  He has a history of hypertension, gastroesophageal 

reflux disease, gout.  Over the past couple of weeks he was having complaints of

increasing weakness and fatigue.  He felt he may have developed heat stroke.  He

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  Last 

evening he presented to the emergency room with complaints of increasing 

dizziness over the past couple weeks.  He was feeling unsteady and feeling as 

though he may pass out.  He's also had issues with nausea.  He did have some 

diaphoresis as well.  Computed tomography scan of the brain revealed no acute 

intracranial abnormality.  Chest x-ray revealed no acute cardiopulmonary 

process.  EKG revealed normal sinus rhythm.  Echocardiogram revealed preserved 

left ventricular systolic function with ejection fraction 50-50%.  No 

significant valvular abnormalities.  Carotid Dopplers revealed no significant 

hemodynamic stenosis.  White count 5.8.  Hemoglobin 13.8.  Platelets 339.  D-

dimer 1.19.  Sodium 137.  Potassium 4.3.  Bicarb 25.  Creatinine 0.97.  Blood 

glucose 172.  Troponins negative 3.  Amylase 49.  Lipase 116.  .  

Creatinine kinase 51.  Dopplers of the lower extremity were negative for DVT.  

CT angiogram revealed a filling defect within the left upper lobe pulmonary 

arterial branch suspicious for a small pulmonary embolism.  There was incidental

finding of a mass in the right upper lobe suspicious for malignancy measuring 

1.4 cm.  Additional 2 mm nodule lateral segment right upper lobe.  Evidence of 

COPD.  We're consulted for the same.  He is seen today in consultation on the 

cardiac observation unit.  He is awake and alert in no acute distress.  Sitting 

up in a chair at the bedside.  Maintaining O2 saturations in the upper 90s on 

room air.  He's been having afebrile.  Hemodynamically stable.  No current chest

pain or palpitations.  No worsening shortness of breath.  No recent weight loss.

 No hemoptysis.  The patient has remote history of chronic tobacco dependence 

however quit over 30 years ago.





Review of Systems





REVIEW OF SYSTEMS:


CONSTITUTIONAL: Denies any recent significant weight loss or weight gain.


EYES: Denies change in vision.


EARS, NOSE, MOUTH, THROAT: Denies headaches, denies sore throat.


CARDIOVASCULAR: Positive for dizziness, lightheadedness, near syncope.  Denies 

chest pain, palpitations.


RESPIRATORY: Positive for shortness of breath, no cough, congestion or 

hemoptysis.


GASTROINTESTINAL: Denies change in appetite, denies abdominal pain


GENITOURINARY: Denies hematuria, denies infections.


MUSKULOSKELETAL: Denies pain, denies swelling.


INTEGUMENTARY: Denies rash, denies eczema.


NEUROLOGICAL: Denies recent memory loss, no recent seizure activity. 


PSYCHIATRIC: Denies anxiety, denies depression.


HEMATOLOGIC/LYMPHATIC: Denies anemia, denies enlarged lymph nodes.








Past Medical History


Past Medical History: Hypertension, Myocardial Infarction (MI)


Additional Past Medical History / Comment(s): Gout


Last Myocardial Infarction Date:: 7\21\2014


History of Any Multi-Drug Resistant Organisms: None Reported


Past Surgical History: Appendectomy


Additional Past Surgical History / Comment(s): pt states appendectomy 30-40 

years ago.  pt states he doesn't know when his last MI was, because he was in 

[place] at the time he also said it was about 6 years ago.


Past Psychological History: No Psychological Hx Reported


Smoking Status: Former smoker


Past Alcohol Use History: None Reported


Past Drug Use History: None Reported


Additional History: Denies any significant family history.





Medications and Allergies


                                Home Medications











 Medication  Instructions  Recorded  Confirmed  Type


 


Allopurinol [Zyloprim] 300 mg PO DAILY 07/21/20 07/21/20 History


 


Aspirin EC [Ecotrin Low Dose] 81 mg PO DAILY 07/21/20 07/21/20 History


 


Omeprazole Magnesium [PriLOSEC OTC] 20 mg PO DAILY 07/21/20 07/21/20 History


 


amLODIPine [Norvasc] 5 mg PO DAILY 07/21/20 07/21/20 History








                                    Allergies











Allergy/AdvReac Type Severity Reaction Status Date / Time


 


No Known Allergies Allergy   Verified 07/21/20 20:34














Physical Exam


Vitals: 


                                   Vital Signs











  Temp Pulse Pulse Resp BP BP BP


 


 07/22/20 08:11    74  16   


 


 07/22/20 07:49  98.1 F   74  16   157/91 


 


 07/22/20 03:00    76    


 


 07/22/20 02:29  98 F   76    138/78 


 


 07/21/20 23:23        163/90


 


 07/21/20 22:05  97.5 F L   86    158/92 


 


 07/21/20 21:06  98.4 F  76   18  146/86  


 


 07/21/20 19:03  98.8 F  78   18  152/92  














  BP BP Pulse Ox


 


 07/22/20 08:11   


 


 07/22/20 07:49    98


 


 07/22/20 03:00   


 


 07/22/20 02:29    98


 


 07/21/20 23:23  135/84  151/82 


 


 07/21/20 22:05    97


 


 07/21/20 21:06    98


 


 07/21/20 19:03    91 L








                                Intake and Output











 07/21/20 07/22/20 07/22/20





 22:59 06:59 14:59


 


Output Total  1100 


 


Balance  -1100 


 


Output:   


 


  Urine  1100 


 


Other:   


 


  Voiding Method  Toilet Toilet


 


  # Voids  1 


 


  Weight 122.47 kg  














GENERAL EXAM: Alert, active, comfortable 70-year-old gentleman, on room air, in 

no apparent distress.


HEAD: Normocephalic.


EYES: Normal reaction of pupils, equal size.


NOSE: Clear with pink turbinates.


THROAT: No erythema or exudates.


NECK: No masses, no JVD.


CHEST: No chest wall deformity.


LUNGS: Equal air entry with no crackles, wheeze, rhonchi or dullness.


CVS: S1 and S2 normal with no audible murmur, regular rhythm.


ABDOMEN: No hepatosplenomegaly, normal bowel sounds, no guarding or rigidity.


SPINE: No scoliosis or deformity


SKIN: No rashes


CENTRAL NERVOUS SYSTEM: No focal deficits, tone is normal in all 4 extremities.


EXTREMITIES: There is no peripheral edema.  No clubbing, no cyanosis.  

Peripheral pulses are intact.





Results





- Laboratory Findings


CBC and BMP: 


                                 07/22/20 06:06





                                 07/22/20 06:06


PT/INR, D-dimer











PT  10.0 sec (9.0-12.0)   07/21/20  19:27    


 


INR  1.0  (<1.2)   07/21/20  19:27    


 


D-Dimer  1.19 mg/L FEU (<0.60)  H  07/22/20  06:06    








Abnormal lab findings: 


                                  Abnormal Labs











  07/21/20 07/22/20 07/22/20





  19:27 06:06 06:06


 


ESR   22 H 


 


D-Dimer   


 


Glucose  142 H   172 H


 


Creatine Kinase    51 L














  07/22/20





  06:06


 


ESR 


 


D-Dimer  1.19 H


 


Glucose 


 


Creatine Kinase 














- Diagnostic Findings


Chest x-ray: image reviewed


CT scan - chest: image reviewed





Assessment and Plan


Assessment: 





1 Dizziness with nausea of unclear etiology.  Doubt related to a possible small 

pulmonary emboli in the left upper lobe





2 1.4 cm mass in the right upper lobe suspicious for malignancy





3 Remote history of chronic tobacco dependence





4 Hypertension





5 History of gout





6 History of coronary artery disease, troponins negative, preserved left 

ventricular systolic function





Plan:





The patient was seen and evaluated by Dr. Samaniego


Chest x-ray, CAT scan and labs reviewed


Possible small pulmonary emboli in the left upper lobe


Initiated Xarelto


Will need follow-up regarding the suspicious right upper lobe mass


We'll plan for a PET scan in the outpatient setting and possible navigational 

bronchoscopy with biopsy


Cleared for discharge once cleared by cardiology


Follow-up in our office in 1-2 weeks' time





I, the cosigning physician, performed a history & physical examination of the 

patient. Lungs sounds are clear.  Maintaining good O2 saturations in the 90s on 

room air.  I discussed the assessment and plan of care with my nurse Luly flanagan. I attest to the above consultation as dictated by her.





Time with Patient: Greater than 30

## 2020-07-22 NOTE — ECHOF
Referral Reason:Stroke



MEASUREMENTS

--------

HEIGHT: 182.9 cm

WEIGHT: 122.5 kg

BP: 

RVIDd:   3.8 cm     (< 3.3)

IVSd:   1.3 cm     (0.6 - 1.1)

LVIDd:   4.9 cm     (3.9 - 5.3)

LVPWd:   1.7 cm     (0.6 - 1.1)

IVSs:   1.6 cm

LVIDs:   3.5 cm

LVPWs:   2.1 cm

LA Diam:   3.8 cm     (2.7 - 3.8)

LAESV Index (A-L):   25.87 ml/m

Ao Diam:   3.1 cm     (2.0 - 3.7)

AV Cusp:   2.3 cm     (1.5 - 2.6)

LA Diam:   3.9 cm     (2.7 - 3.8)

MV EXCURSION:   22.646 mm     (> 18.000)

MV EF SLOPE:   72 mm/s     (70 - 150)

EPSS:   1.4 cm

MV E Angel:   0.52 m/s

MV DecT:   237 ms

MV A Angel:   0.65 m/s

MV E/A Ratio:   0.80 

RAP:   5.00 mmHg

RVSP:   12.36 mmHg







FINDINGS

--------

Sinus rhythm.

Morbid Obesity

The left ventricular size is normal.   There is mild concentric left ventricular hypertrophy.   Overa
ll left ventricular systolic function is low-normal with, an EF between 50 - 55 %.

The right ventricle is normal in size.

The left atrial size is normal.

The right atrial size is normal.

5.0mg OF Lumason UTLIZED: 2 OR MORE WALL SEGMENTS NOT VISUALIZED.

The aortic valve was not well visualized.

Mild mitral regurgitation is present.

Mild tricuspid regurgitation present.   Right ventricular systolic pressure is normal at < 35 mmHg.

The pulmonic valve was not well visualized.

The aortic root size is normal.

There is no pericardial effusion.



CONCLUSIONS

--------

1. Sinus rhythm.

2. Morbid Obesity

3. The left ventricular size is normal.

4. There is mild concentric left ventricular hypertrophy.

5. Overall left ventricular systolic function is low-normal with, an EF between 50 - 55 %.

6. The right ventricle is normal in size.

7. The left atrial size is normal.

8. The right atrial size is normal.

9. 5.0mg OF Lumason UTLIZED: 2 OR MORE WALL SEGMENTS NOT VISUALIZED.

10. The aortic valve was not well visualized.

11. Mild mitral regurgitation is present.

12. Mild tricuspid regurgitation present.

13. Right ventricular systolic pressure is normal at < 35 mmHg.

14. The pulmonic valve was not well visualized.





SONOGRAPHER: Jordana Kellogg RDCS

## 2020-07-22 NOTE — US
EXAMINATION TYPE: US carotid duplex BILAT

 

DATE OF EXAM: 7/22/2020

 

COMPARISON: NONE

 

CLINICAL HISTORY: stroke. Dizziness, nausea

 

EXAM MEASUREMENTS: 

 

RIGHT:  Peak Systolic Velocity (PSV) cm/sec

----- Right CCA:  66.3  

----- Right ICA:  80.7     

----- Right ECA:  77.6   

ICA/CCA ratio:  1.2    

 

RIGHT:  End Diastole cm/sec

----- Right CCA:  17.6   

----- Right ICA:  29.5      

----- Right ECA:  17.1     

 

LEFT:  Peak Systolic Velocity (PSV) cm/sec

----- Left CCA:  72.7  

----- Left ICA:  85.6   

----- Left ECA:  106.8  

ICA/CCA ratio:  1.2  

 

LEFT:  End Diastole cm/sec

----- Left CCA:  19.3  

----- Left ICA:  35.1   

----- Left ECA:  16.9 

 

VERTEBRALS (direction of flow):

Right Vertebral: Antegrade

Left Vertebral: Antegrade

 

Rhythm:  Normal

 

Bilateral intimal thickening, minimal plaque bilateral bulb, no elevated velocities, no significant s
tenosis.

 

 

 

IMPRESSION:  

1. Bilateral intimal thickening with no significant hemodynamic stenosis by carotid Doppler ultrasoun
d.   

 

 

Criteria for Assigning % of Stenosis / Diameter reduction

(Estimation based on the indirect measurements of the internal carotid artery velocities (ICA PSV).

1.  Normal (no stenosis)=ICA PSV < 125 cm/s: ratio < 2.0: ICA EDV<40 cm/s.

2. Less than 50% stenosis=ICA PSV < 125 cm/s: ratio < 2.0: ICA EDV<40 cm/s.

3.  50 to 69% stenosis=ICA PSV of 125 to 230 cm/s: ration 2.0 ? 4.0: ICA EDV  cm/s.

4.  Greater than 70% stenosis to near occlusion= ICA PSV > 230 cm/s: ratio > 4.0: ICA EDV > 100 cm/s.
 

5.  Near occlusion= ICA PSV velocities may be low or undetectable: variable ratio and ICA EDV.

6.  Total occlusion=unable to detect flow.

## 2020-07-23 VITALS
RESPIRATION RATE: 14 BRPM | TEMPERATURE: 98 F | DIASTOLIC BLOOD PRESSURE: 96 MMHG | SYSTOLIC BLOOD PRESSURE: 165 MMHG | HEART RATE: 73 BPM

## 2020-07-23 LAB
ANION GAP SERPL CALC-SCNC: 8 MMOL/L
BASOPHILS # BLD AUTO: 0.1 K/UL (ref 0–0.2)
BASOPHILS NFR BLD AUTO: 1 %
BUN SERPL-SCNC: 13 MG/DL (ref 9–20)
CALCIUM SPEC-MCNC: 8.8 MG/DL (ref 8.4–10.2)
CHLORIDE SERPL-SCNC: 106 MMOL/L (ref 98–107)
CO2 SERPL-SCNC: 24 MMOL/L (ref 22–30)
EOSINOPHIL # BLD AUTO: 0.2 K/UL (ref 0–0.7)
EOSINOPHIL NFR BLD AUTO: 3 %
ERYTHROCYTE [DISTWIDTH] IN BLOOD BY AUTOMATED COUNT: 4.6 M/UL (ref 4.3–5.9)
ERYTHROCYTE [DISTWIDTH] IN BLOOD: 12.8 % (ref 11.5–15.5)
GLUCOSE SERPL-MCNC: 162 MG/DL (ref 74–99)
HCT VFR BLD AUTO: 43.9 % (ref 39–53)
HGB BLD-MCNC: 14.8 GM/DL (ref 13–17.5)
LYMPHOCYTES # SPEC AUTO: 1.9 K/UL (ref 1–4.8)
LYMPHOCYTES NFR SPEC AUTO: 29 %
MCH RBC QN AUTO: 32.2 PG (ref 25–35)
MCHC RBC AUTO-ENTMCNC: 33.7 G/DL (ref 31–37)
MCV RBC AUTO: 95.3 FL (ref 80–100)
MONOCYTES # BLD AUTO: 0.4 K/UL (ref 0–1)
MONOCYTES NFR BLD AUTO: 6 %
NEUTROPHILS # BLD AUTO: 3.9 K/UL (ref 1.3–7.7)
NEUTROPHILS NFR BLD AUTO: 59 %
PLATELET # BLD AUTO: 336 K/UL (ref 150–450)
POTASSIUM SERPL-SCNC: 4.4 MMOL/L (ref 3.5–5.1)
SODIUM SERPL-SCNC: 138 MMOL/L (ref 137–145)
WBC # BLD AUTO: 6.5 K/UL (ref 3.8–10.6)

## 2020-07-23 RX ADMIN — ASPIRIN 81 MG CHEWABLE TABLET SCH: 81 TABLET CHEWABLE at 09:30

## 2020-07-23 RX ADMIN — CEFAZOLIN SCH MLS/HR: 330 INJECTION, POWDER, FOR SOLUTION INTRAMUSCULAR; INTRAVENOUS at 00:30

## 2020-07-23 RX ADMIN — RIVAROXABAN SCH MG: 15 TABLET, FILM COATED ORAL at 06:33

## 2020-07-23 RX ADMIN — PANTOPRAZOLE SODIUM SCH MG: 40 INJECTION, POWDER, FOR SOLUTION INTRAVENOUS at 09:31

## 2020-07-23 NOTE — P.PN
Subjective


Progress Note Date: 07/23/20


Principal diagnosis: 





Dizziness





This is a very pleasant 70-year-old gentleman who follows with Dr. Garcia as 

his primary care provider.  He has a history of hypertension, gastroesophageal 

reflux disease, gout.  Over the past couple of weeks he was having complaints of

increasing weakness and fatigue.  He felt he may have developed heat stroke.  He

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  Last 

evening he presented to the emergency room with complaints of increasing 

dizziness over the past couple weeks.  He was feeling unsteady and feeling as 

though he may pass out.  He's also had issues with nausea.  He did have some 

diaphoresis as well.  Computed tomography scan of the brain revealed no acute 

intracranial abnormality.  Chest x-ray revealed no acute cardiopulmonary 

process.  EKG revealed normal sinus rhythm.  Echocardiogram revealed preserved 

left ventricular systolic function with ejection fraction 50-50%.  No 

significant valvular abnormalities.  Carotid Dopplers revealed no significant 

hemodynamic stenosis.  White count 5.8.  Hemoglobin 13.8.  Platelets 339.  D-

dimer 1.19.  Sodium 137.  Potassium 4.3.  Bicarb 25.  Creatinine 0.97.  Blood 

glucose 172.  Troponins negative 3.  Amylase 49.  Lipase 116.  .  

Creatinine kinase 51.  Dopplers of the lower extremity were negative for DVT.  

CT angiogram revealed a filling defect within the left upper lobe pulmonary 

arterial branch suspicious for a small pulmonary embolism.  There was incidental

finding of a mass in the right upper lobe suspicious for malignancy measuring 

1.4 cm.  Additional 2 mm nodule lateral segment right upper lobe.  Evidence of 

COPD.  We're consulted for the same.  He is seen today in consultation on the 

cardiac observation unit.  He is awake and alert in no acute distress.  Sitting 

up in a chair at the bedside.  Maintaining O2 saturations in the upper 90s on 

room air.  He's been having afebrile.  Hemodynamically stable.  No current chest

pain or palpitations.  No worsening shortness of breath.  No recent weight loss.

 No hemoptysis.  The patient has remote history of chronic tobacco dependence 

however quit over 30 years ago.





The patient is seen today 07/23/2020 in follow-up on the cardiac observation 

unit.  He is resting comfortably in bed.  Awake and alert in no acute distress. 

No shortness of breath, cough or congestion.  No hemoptysis.  No further 

dizziness or lightheadedness.  He is maintaining good O2 saturations in the mid 

90s on room air.  He's been afebrile.  White count 6.5.  Hemoglobin 14.8.  

Sodium 138.  Potassium 4.4.  Creatinine 0.84.  He has been initiated on Xarelto.





Objective





- Vital Signs


Vital signs: 


                                   Vital Signs











Temp  98.0 F   07/23/20 07:41


 


Pulse  73   07/23/20 09:00


 


Resp  14   07/23/20 09:00


 


BP  165/96   07/23/20 07:41


 


Pulse Ox  94 L  07/23/20 07:41








                                 Intake & Output











 07/22/20 07/23/20 07/23/20





 18:59 06:59 18:59


 


Intake Total  100 240


 


Output Total 1500  


 


Balance -1500 100 240


 


Intake:   


 


  Oral  100 240


 


Output:   


 


  Urine 1500  


 


Other:   


 


  Voiding Method Toilet Toilet Toilet


 


  # Voids 1  2














- Exam





GENERAL EXAM: Alert, pleasant 70-year-old gentleman, on room air, comfortable in

no apparent distress.


HEAD: Normocephalic.


EYES: Normal reaction of pupils, equal size.


NOSE: Clear with pink turbinates.


THROAT: No erythema or exudates.


NECK: No masses, no JVD.


CHEST: No chest wall deformity.


LUNGS: Equal air entry with no crackles, wheeze, rhonchi or dullness.


CVS: S1 and S2 normal with no audible murmur, regular rhythm.


ABDOMEN: No hepatosplenomegaly, normal bowel sounds, no guarding or rigidity.


SPINE: No scoliosis or deformity


SKIN: No rashes


CENTRAL NERVOUS SYSTEM: No focal deficits, tone is normal in all 4 extremities.


EXTREMITIES: There is no peripheral edema.  No clubbing, no cyanosis.  

Peripheral pulses are intact.





- Labs


CBC & Chem 7: 


                                 07/23/20 06:46





                                 07/23/20 06:46


Labs: 


                  Abnormal Lab Results - Last 24 Hours (Table)











  07/23/20 Range/Units





  06:46 


 


Glucose  162 H  (74-99)  mg/dL














Assessment and Plan


Assessment: 





1 Dizziness with nausea of unclear etiology.  Doubt related to a possible small 

pulmonary emboli in the left upper lobe





2 1.4 cm mass in the right upper lobe suspicious for malignancy





3 Remote history of chronic tobacco dependence





4 Hypertension





5 History of gout





6 History of coronary artery disease, troponins negative, preserved left 

ventricular systolic function





Plan:





The patient was seen and evaluated by Dr. Danette Allen for discharge from the pulmonary standpoint


Initiated on Xarelto


He will need follow-up regarding the suspicious right upper lobe mass


We'll plan for a PET scan in the outpatient setting and possible navigational 

bronchoscopy with biopsy


Follow-up in our office in 1-2 weeks' time





I, the cosigning physician, performed a history & physical examination of the 

patient. Lungs sounds are clear.  Maintaining good O2 saturations in the 90s on 

room air.  I discussed the assessment and plan of care with my nurse 

practitioner, Luly Puga. I attest to the above consultation as dictated by her.

## 2020-07-23 NOTE — P.PN
Subjective





HISTORY OF PRESENTING ILLNESS


This is a pleasant 70-year-old  male past medical history significant 

for coronary artery disease according to the patient he had a cardiac 

catheterization in [place] about 6 years ago he was told he had a myocardial 

infarction but had collateral circulation.  He also has been diagnosed with 

hypertension in the past.  He does not follow regularly in the office with a 

cardiologist. computed tomography scan performed yesterday revealed evidence of 

small pulmonary embolism.  He has been seen by pulmonology and started on 

Xarelto.  There was also a pulmonary nodule that they plan to follow as an 

outpatient.  Bilateral lower extremity Doppler was negative for DVT.  He is seen

and examined resting comfortably sitting up in bed in no acute distress.  He 

states he has been up and ambulating to the bathroom and has had no symptoms of 

dizziness.  He denies chest pain, shortness of breath or palpitations.  Blood 

pressure 165/96 heart rate 73 afebrile maintaining oxygen saturation on nasal 

cannula. Laboratory data reviewed, CBC unremarkable, sodium 138, potassium 4.4, 

creatinine 0.84. Echocardiogram reveals preserved LV systolic function with 

ejection fraction 50-55%, mild concentric LVH, mild MR and mild TR.





PHYSICAL EXAMINATION


CONSTITUTIONAL: No apparent distress.  Obese.


HEENT: Head is normocephalic. Pupils are equal, round. Sclerae anicteric. Mucous

membranes of the mouth are moist.  No JVD. No carotid bruit.


CHEST EXAMINATION: Lungs are clear to auscultation. No chest wall tenderness is 

noted on palpation or with deep breathing. 


HEART EXAMINATION: Regular rate and rhythm. S1, S2 heard. No murmurs, gallops or

rub.


EXTREMITIES: 2+ peripheral pulses, no lower extremity edema and no calf 

tenderness.





ASSESSMENT


Dizziness


Acute pulmonary embolism


History of myocardial infarction


Hypertension


Obesity, BMI 38





PLAN


Stable from a cardiac perspective.


Recommend outpatient follow-up and stress testing in 3-4 weeks.





Nurse Practitioner note has been reviewed, I agree with a documented findings 

and plan of care.  Patient was seen and examined.





Objective





- Vital Signs


Vital signs: 


                                   Vital Signs











Temp  98.0 F   07/23/20 07:41


 


Pulse  73   07/23/20 09:00


 


Resp  14   07/23/20 09:00


 


BP  165/96   07/23/20 07:41


 


Pulse Ox  94 L  07/23/20 07:41








                                 Intake & Output











 07/22/20 07/23/20 07/23/20





 18:59 06:59 18:59


 


Intake Total  100 240


 


Output Total 1500  


 


Balance -1500 100 240


 


Intake:   


 


  Oral  100 240


 


Output:   


 


  Urine 1500  


 


Other:   


 


  Voiding Method Toilet Toilet Toilet


 


  # Voids 1  2














- Labs


CBC & Chem 7: 


                                 07/23/20 06:46





                                 07/23/20 06:46


Labs: 


                  Abnormal Lab Results - Last 24 Hours (Table)











  07/23/20 Range/Units





  06:46 


 


Glucose  162 H  (74-99)  mg/dL

## 2020-07-24 NOTE — DS
DISCHARGE SUMMARY



DATE OF SERVICE:

07/23/2020



FINAL DIAGNOSES:

1. Dizziness and nausea, possible acute gastritis, improved.

2. Small pulmonary embolism in the left upper lobe pulmonary artery branch.

3. Mass in the right upper lobe suspicious for malignancy measuring 1.4 cm; in

    addition, a 2 mm nodule in the lateral segment of the right upper lobe.

4. COVID-19 ruled out.

5. Hypertension.

6. History of myocardial infarction.

7. History of appendectomy.

8. Remote history of nicotine dependence.

9. Obesity with body mass index of 38.7.

10.Dehydration, present on admission.

11.History of recent heat exhaustion.



DISCHARGE DISPOSITION:

The patient will be discharged in stable condition with guarded prognosis.



HISTORY OF PRESENT ILLNESS:

This 70-year-old gentleman with a past medical history of multiple medical problems

admitted with dizziness and nausea with acute gastritis.  Further evaluation showed

possibility of pulmonary embolism and as well as mass in the right upper lobe.  Dr. Samaniego recommended outpatient followup and as well as further evaluation including PET

scan. Otherwise, Neurology saw the patient.  Patient improved significantly.



On exam, vitals are stable.

CARDIOVASCULAR: S1, S2 muffled.

ABDOMEN: Soft.

NERVOUS SYSTEM: No focal deficits.



D-Dimer was 1.19.



DISCHARGE ADVICE:

1. Diet is cardiac diet.

2. Activity limited until followup.

3. Follow up with Dr. Stovall in 2 to 3 days.

4. Follow up with Dr. Samaniego as recommended.

5. Follow up with Cardiology as recommended.

Medications are:

1. Ecotrin 81 mg p.o. daily.

2. Norvasc 5 mg p.o. daily.

3. Zyloprim 300 mg p.o. daily.

4. Protonix 40 mg p.o. b.i.d.

5. Xarelto starter pack 15 mg p.o. b.i.d. for 3 weeks and then 20 mg p.o. daily.

Once again, the patient will be discharged in a stable condition with guarded

prognosis.





MMODL / IJN: [id number] / Job#: [id number]

## 2020-08-07 ENCOUNTER — HOSPITAL ENCOUNTER (OUTPATIENT)
Dept: HOSPITAL 47 - RADPETMAIN | Age: 70
Discharge: HOME | End: 2020-08-07
Attending: FAMILY MEDICINE
Payer: MEDICARE

## 2020-08-07 DIAGNOSIS — R91.8: Primary | ICD-10-CM

## 2020-08-07 PROCEDURE — 78815 PET IMAGE W/CT SKULL-THIGH: CPT

## 2020-08-10 NOTE — PE
Nuclear medicine PET/CT

 

HISTORY: Lung carcinoma, solitary pulmonary nodule, initial

 

Patient received 10 mCi F-18 FDG intravenously in delayed scanning was performed from the skull base 
to the mid thighs. Localization and attenuation correction CT scan was performed.

 

Correlation to CT scan of the chest dated 7/22/2020

 

Chest and neck: There is no cervical or supraclavicular adenopathy. Superior mediastinal adenopathy w
ith associated hypermetabolic uptake is noted, superior mediastinal node anterior to the innominate v
ein shows uptake. There is right hilar uptake present, right hilar adenopathy. Left upper lobe lung n
odule shows associated uptake. There are coronary artery calcifications present. Emphysematous change
s are present within the lungs, there is no pleural or pericardial effusion.

 

ABDOMEN: There is no evident adrenal mass or liver mass. There is no ascites. There is a node present
 in the portal region which shows some mild uptake. Aorta shows atheromatous change. There is a small
 hiatal hernia. Upper pole the right kidney shows an exophytic probable cyst.

 

Osseous structures are within normal limits.

 

IMPRESSION: Findings compatible with lung carcinoma as described.

## 2020-10-23 ENCOUNTER — HOSPITAL ENCOUNTER (OUTPATIENT)
Dept: HOSPITAL 47 - RADCTMAIN | Age: 70
Discharge: HOME | End: 2020-10-23
Attending: INTERNAL MEDICINE
Payer: MEDICARE

## 2020-10-23 DIAGNOSIS — R91.1: Primary | ICD-10-CM

## 2020-10-23 DIAGNOSIS — R59.0: ICD-10-CM

## 2020-10-23 LAB — BUN SERPL-SCNC: 13 MG/DL (ref 9–20)

## 2020-10-23 PROCEDURE — 71260 CT THORAX DX C+: CPT

## 2020-10-23 PROCEDURE — 84520 ASSAY OF UREA NITROGEN: CPT

## 2020-10-23 PROCEDURE — 82565 ASSAY OF CREATININE: CPT

## 2020-10-23 PROCEDURE — 36415 COLL VENOUS BLD VENIPUNCTURE: CPT

## 2020-10-23 NOTE — CT
EXAMINATION TYPE: CT chest w con

 

DATE OF EXAM: 10/23/2020

 

COMPARISON: Prior chest CT dated 7/22/2020, chest x-ray 9/29/2020, PET/CT 8/7/2020

 

HISTORY: Abnormal lung field, prior CT and PET

 

CT DLP: 660.8 mGycm

Automated exposure control for dose reduction was used.

 

CONTRAST: 

CT scan of the chest is performed with IV Contrast, patient injected with 100 mL of Isovue 300.

 

FINDINGS:

 

LUNGS: The right upper lobe lung nodule is decreased slightly in size measuring only approximately 1 
cm down from approximately 16 to 17 mm. On axial image 14 in the right upper lobe there is a 8mm nodu
le which was not seen on prior exam, irregular margins are present There is no pleural effusion or pn
eumothorax seen.  The tracheobronchial tree is patent. Emphysematous changes are again noted. There i
s some pleural thickening the right upper lobe, axial image #17 not seen on prior exams

 

MEDIASTINUM: There are no greater than 1 cm hilar or mediastinal lymph nodes.  Mild prominence of pul
monary artery, consider pulmonary artery hypertension, there are coronary artery calcifications. No p
ericardial effusion is seen.  

 

AORTA:  No additional significant abnormality is seen.

 

 

OTHER:  No additional significant abnormality is seen.

 

IMPRESSION:  There is decreased size of the right upper lobe pulmonary nodule, improvement in mediast
inal and right hilar adenopathy. New subcentimeter nodule right upper lobe.

## 2020-12-05 ENCOUNTER — HOSPITAL ENCOUNTER (INPATIENT)
Dept: HOSPITAL 47 - EC | Age: 70
LOS: 17 days | Discharge: NURSING, REHAB OR LONG-TERM CARE | DRG: 177 | End: 2020-12-22
Attending: INTERNAL MEDICINE | Admitting: INTERNAL MEDICINE
Payer: MEDICARE

## 2020-12-05 VITALS — BODY MASS INDEX: 29.9 KG/M2

## 2020-12-05 DIAGNOSIS — U07.1: Primary | ICD-10-CM

## 2020-12-05 DIAGNOSIS — I25.10: ICD-10-CM

## 2020-12-05 DIAGNOSIS — G81.91: ICD-10-CM

## 2020-12-05 DIAGNOSIS — T50.1X5A: ICD-10-CM

## 2020-12-05 DIAGNOSIS — E83.42: ICD-10-CM

## 2020-12-05 DIAGNOSIS — R47.81: ICD-10-CM

## 2020-12-05 DIAGNOSIS — M10.9: ICD-10-CM

## 2020-12-05 DIAGNOSIS — R63.0: ICD-10-CM

## 2020-12-05 DIAGNOSIS — I25.2: ICD-10-CM

## 2020-12-05 DIAGNOSIS — R27.0: ICD-10-CM

## 2020-12-05 DIAGNOSIS — Z87.891: ICD-10-CM

## 2020-12-05 DIAGNOSIS — I11.0: ICD-10-CM

## 2020-12-05 DIAGNOSIS — I27.82: ICD-10-CM

## 2020-12-05 DIAGNOSIS — E11.65: ICD-10-CM

## 2020-12-05 DIAGNOSIS — J12.89: ICD-10-CM

## 2020-12-05 DIAGNOSIS — Z79.4: ICD-10-CM

## 2020-12-05 DIAGNOSIS — Z79.899: ICD-10-CM

## 2020-12-05 DIAGNOSIS — Z79.01: ICD-10-CM

## 2020-12-05 DIAGNOSIS — Z90.49: ICD-10-CM

## 2020-12-05 DIAGNOSIS — I08.1: ICD-10-CM

## 2020-12-05 DIAGNOSIS — K21.9: ICD-10-CM

## 2020-12-05 DIAGNOSIS — Z86.711: ICD-10-CM

## 2020-12-05 DIAGNOSIS — K52.9: ICD-10-CM

## 2020-12-05 DIAGNOSIS — D72.819: ICD-10-CM

## 2020-12-05 DIAGNOSIS — R29.810: ICD-10-CM

## 2020-12-05 DIAGNOSIS — J96.21: ICD-10-CM

## 2020-12-05 DIAGNOSIS — R91.1: ICD-10-CM

## 2020-12-05 DIAGNOSIS — I50.33: ICD-10-CM

## 2020-12-05 DIAGNOSIS — E78.5: ICD-10-CM

## 2020-12-05 DIAGNOSIS — E87.1: ICD-10-CM

## 2020-12-05 DIAGNOSIS — E86.0: ICD-10-CM

## 2020-12-05 DIAGNOSIS — T38.0X5A: ICD-10-CM

## 2020-12-05 DIAGNOSIS — E11.51: ICD-10-CM

## 2020-12-05 DIAGNOSIS — I48.0: ICD-10-CM

## 2020-12-05 DIAGNOSIS — A08.4: ICD-10-CM

## 2020-12-05 DIAGNOSIS — I63.81: ICD-10-CM

## 2020-12-05 LAB
ALBUMIN SERPL-MCNC: 3.3 G/DL (ref 3.5–5)
ALP SERPL-CCNC: 53 U/L (ref 38–126)
ALT SERPL-CCNC: 17 U/L (ref 4–49)
ANION GAP SERPL CALC-SCNC: 8 MMOL/L
APTT BLD: 29.2 SEC (ref 22–30)
AST SERPL-CCNC: 30 U/L (ref 17–59)
BASOPHILS # BLD AUTO: 0.1 K/UL (ref 0–0.2)
BASOPHILS NFR BLD AUTO: 2 %
BUN SERPL-SCNC: 15 MG/DL (ref 9–20)
CALCIUM SPEC-MCNC: 7.5 MG/DL (ref 8.4–10.2)
CHLORIDE SERPL-SCNC: 103 MMOL/L (ref 98–107)
CO2 SERPL-SCNC: 20 MMOL/L (ref 22–30)
EOSINOPHIL # BLD AUTO: 0 K/UL (ref 0–0.7)
EOSINOPHIL NFR BLD AUTO: 0 %
ERYTHROCYTE [DISTWIDTH] IN BLOOD BY AUTOMATED COUNT: 4.52 M/UL (ref 4.3–5.9)
ERYTHROCYTE [DISTWIDTH] IN BLOOD: 12 % (ref 11.5–15.5)
GLUCOSE BLD-MCNC: 343 MG/DL (ref 75–99)
GLUCOSE SERPL-MCNC: 331 MG/DL (ref 74–99)
GLUCOSE UR QL: (no result)
HCT VFR BLD AUTO: 40.7 % (ref 39–53)
HGB BLD-MCNC: 14.1 GM/DL (ref 13–17.5)
HYALINE CASTS UR QL AUTO: 1 /LPF (ref 0–2)
INR PPP: 1.1 (ref ?–1.2)
KETONES UR QL STRIP.AUTO: (no result)
LYMPHOCYTES # SPEC AUTO: 0.7 K/UL (ref 1–4.8)
LYMPHOCYTES NFR SPEC AUTO: 18 %
MAGNESIUM SPEC-SCNC: 1.5 MG/DL (ref 1.6–2.3)
MCH RBC QN AUTO: 31.1 PG (ref 25–35)
MCHC RBC AUTO-ENTMCNC: 34.5 G/DL (ref 31–37)
MCV RBC AUTO: 90.2 FL (ref 80–100)
MONOCYTES # BLD AUTO: 0.2 K/UL (ref 0–1)
MONOCYTES NFR BLD AUTO: 6 %
NEUTROPHILS # BLD AUTO: 2.8 K/UL (ref 1.3–7.7)
NEUTROPHILS NFR BLD AUTO: 73 %
PH UR: 5.5 [PH] (ref 5–8)
PLATELET # BLD AUTO: 166 K/UL (ref 150–450)
POTASSIUM SERPL-SCNC: 3.9 MMOL/L (ref 3.5–5.1)
PROT SERPL-MCNC: 6.1 G/DL (ref 6.3–8.2)
PROT UR QL: (no result)
PT BLD: 11.2 SEC (ref 9–12)
RBC UR QL: 2 /HPF (ref 0–5)
SODIUM SERPL-SCNC: 131 MMOL/L (ref 137–145)
SP GR UR: 1.04 (ref 1–1.03)
UROBILINOGEN UR QL STRIP: <2 MG/DL (ref ?–2)
WBC # BLD AUTO: 3.9 K/UL (ref 3.8–10.6)
WBC # UR AUTO: <1 /HPF (ref 0–5)

## 2020-12-05 PROCEDURE — 93005 ELECTROCARDIOGRAM TRACING: CPT

## 2020-12-05 PROCEDURE — 83605 ASSAY OF LACTIC ACID: CPT

## 2020-12-05 PROCEDURE — 85025 COMPLETE CBC W/AUTO DIFF WBC: CPT

## 2020-12-05 PROCEDURE — 85610 PROTHROMBIN TIME: CPT

## 2020-12-05 PROCEDURE — 80048 BASIC METABOLIC PNL TOTAL CA: CPT

## 2020-12-05 PROCEDURE — 83880 ASSAY OF NATRIURETIC PEPTIDE: CPT

## 2020-12-05 PROCEDURE — 84443 ASSAY THYROID STIM HORMONE: CPT

## 2020-12-05 PROCEDURE — 94760 N-INVAS EAR/PLS OXIMETRY 1: CPT

## 2020-12-05 PROCEDURE — 87070 CULTURE OTHR SPECIMN AEROBIC: CPT

## 2020-12-05 PROCEDURE — 96361 HYDRATE IV INFUSION ADD-ON: CPT

## 2020-12-05 PROCEDURE — 93306 TTE W/DOPPLER COMPLETE: CPT

## 2020-12-05 PROCEDURE — 71045 X-RAY EXAM CHEST 1 VIEW: CPT

## 2020-12-05 PROCEDURE — 82728 ASSAY OF FERRITIN: CPT

## 2020-12-05 PROCEDURE — 84484 ASSAY OF TROPONIN QUANT: CPT

## 2020-12-05 PROCEDURE — 96365 THER/PROPH/DIAG IV INF INIT: CPT

## 2020-12-05 PROCEDURE — 71046 X-RAY EXAM CHEST 2 VIEWS: CPT

## 2020-12-05 PROCEDURE — 87040 BLOOD CULTURE FOR BACTERIA: CPT

## 2020-12-05 PROCEDURE — 94660 CPAP INITIATION&MGMT: CPT

## 2020-12-05 PROCEDURE — 83735 ASSAY OF MAGNESIUM: CPT

## 2020-12-05 PROCEDURE — 83036 HEMOGLOBIN GLYCOSYLATED A1C: CPT

## 2020-12-05 PROCEDURE — 80053 COMPREHEN METABOLIC PANEL: CPT

## 2020-12-05 PROCEDURE — 83615 LACTATE (LD) (LDH) ENZYME: CPT

## 2020-12-05 PROCEDURE — 85384 FIBRINOGEN ACTIVITY: CPT

## 2020-12-05 PROCEDURE — 87205 SMEAR GRAM STAIN: CPT

## 2020-12-05 PROCEDURE — 84439 ASSAY OF FREE THYROXINE: CPT

## 2020-12-05 PROCEDURE — 93308 TTE F-UP OR LMTD: CPT

## 2020-12-05 PROCEDURE — 81001 URINALYSIS AUTO W/SCOPE: CPT

## 2020-12-05 PROCEDURE — 87449 NOS EACH ORGANISM AG IA: CPT

## 2020-12-05 PROCEDURE — 82009 KETONE BODYS QUAL: CPT

## 2020-12-05 PROCEDURE — 99285 EMERGENCY DEPT VISIT HI MDM: CPT

## 2020-12-05 PROCEDURE — 36415 COLL VENOUS BLD VENIPUNCTURE: CPT

## 2020-12-05 PROCEDURE — 82805 BLOOD GASES W/O2 SATURATION: CPT

## 2020-12-05 PROCEDURE — 70498 CT ANGIOGRAPHY NECK: CPT

## 2020-12-05 PROCEDURE — 36600 WITHDRAWAL OF ARTERIAL BLOOD: CPT

## 2020-12-05 PROCEDURE — 87635 SARS-COV-2 COVID-19 AMP PRB: CPT

## 2020-12-05 PROCEDURE — 86140 C-REACTIVE PROTEIN: CPT

## 2020-12-05 PROCEDURE — 84145 PROCALCITONIN (PCT): CPT

## 2020-12-05 PROCEDURE — 85027 COMPLETE CBC AUTOMATED: CPT

## 2020-12-05 PROCEDURE — 80061 LIPID PANEL: CPT

## 2020-12-05 PROCEDURE — 70551 MRI BRAIN STEM W/O DYE: CPT

## 2020-12-05 PROCEDURE — 85730 THROMBOPLASTIN TIME PARTIAL: CPT

## 2020-12-05 PROCEDURE — 70496 CT ANGIOGRAPHY HEAD: CPT

## 2020-12-05 PROCEDURE — 82550 ASSAY OF CK (CPK): CPT

## 2020-12-05 PROCEDURE — 85379 FIBRIN DEGRADATION QUANT: CPT

## 2020-12-05 RX ADMIN — ACETAMINOPHEN PRN MG: 325 TABLET, FILM COATED ORAL at 21:18

## 2020-12-05 NOTE — ED
General Adult HPI





- General


Chief complaint: Weakness


Stated complaint: Weakness


Time Seen by Provider: 12/05/20 13:26


Source: patient, EMS, RN notes reviewed, old records reviewed


Mode of arrival: EMS


Limitations: no limitations





- History of Present Illness


Initial comments: 





70-year-old male with 1 week of fatigue, generalized weakness.  States he has 

been sleeping upwards of 20 hours a day.  He has had a poor appetite and has not

had much to eat.  Denies cough or URI symptoms.  Denies dysuria or hematuria.  

Denies abdominal pain.  Denies chest pain.  He was noted to have blood sugar 

over 400 by EMS.  He does not have history of diabetes.  Not currently on 

steroids.  He does report fever over this time.





- Related Data


                                Home Medications











 Medication  Instructions  Recorded  Confirmed


 


Allopurinol [Zyloprim] 300 mg PO DAILY 07/21/20 12/05/20


 


amLODIPine [Norvasc] 5 mg PO BID 07/21/20 12/05/20


 


Metoprolol Succinate [Toprol XL] 50 mg PO DAILY 12/05/20 12/05/20


 


Naproxen Sodium [Naprelan] 500 mg PO BID PRN 12/05/20 12/05/20


 


Omeprazole 20 mg PO DAILY 12/05/20 12/05/20


 


Rivaroxaban [Xarelto] 20 mg PO DAILY 12/05/20 12/05/20











                                    Allergies











Allergy/AdvReac Type Severity Reaction Status Date / Time


 


No Known Allergies Allergy   Verified 12/05/20 15:10














Review of Systems


ROS Statement: 


Those systems with pertinent positive or pertinent negative responses have been 

documented in the HPI.





ROS Other: All systems not noted in ROS Statement are negative.





Past Medical History


Past Medical History: Hypertension, Myocardial Infarction (MI)


Additional Past Medical History / Comment(s): Gout


Last Myocardial Infarction Date:: 7\21\2014


History of Any Multi-Drug Resistant Organisms: None Reported


Past Surgical History: Appendectomy


Additional Past Surgical History / Comment(s): pt states appendectomy 30-40 

years ago.  pt states he doesn't know when his last MI was, because he was in 

intermediate at the time he also said it was about 6 years ago.


Past Psychological History: No Psychological Hx Reported


Smoking Status: Former smoker


Past Alcohol Use History: None Reported


Past Drug Use History: None Reported





General Exam


Limitations: no limitations


General appearance: alert, in no apparent distress


Head exam: Present: atraumatic, normocephalic


Eye exam: Present: normal appearance


ENT exam: Present: normal exam.  Absent: mucous membranes dry


Neck exam: Present: normal inspection


Respiratory exam: Present: normal lung sounds bilaterally.  Absent: respiratory 

distress, wheezes


Cardiovascular Exam: Present: regular rate, normal rhythm


GI/Abdominal exam: Present: soft.  Absent: distended, tenderness, guarding, 

rebound


Extremities exam: Present: normal inspection, normal capillary refill.  Absent: 

pedal edema


Neurological exam: Present: alert, oriented X3, CN II-XII intact.  Absent: motor

 sensory deficit


Psychiatric exam: Present: normal affect, normal mood


Skin exam: Present: warm, dry, intact.  Absent: cyanosis, diaphoretic





Course


                                   Vital Signs











  12/05/20 12/05/20





  12:53 14:38


 


Temperature 99.7 F H 


 


Pulse Rate 90 87


 


Respiratory 18 18





Rate  


 


Blood Pressure 145/83 150/94


 


O2 Sat by Pulse 95 96





Oximetry  














EKG Findings





- EKG Comments:


EKG Findings:: EKG: Normal sinus rhythm, low voltage, rate of 89, VA interval 

150, QRS duration 86,  no ST segment elevation.





Medical Decision Making





- Medical Decision Making





70-year-old male with generalized weakness, fatigue, hyperglycemia.  No cough or

 dyspnea.  Patient does have coronavirus this was suspected given his symptoms. 

 He is hyperglycemic with initial blood sugar in the 400s.  This is down 

trending with insulin and IV fluids.  Patient does not feel that he can manage 

at home given the level of weakness and fatigue.  His chest x-ray is negative 

for focal pneumonia.  He will be admitted for rehydration, glucose monitoring.  

Case discussed with Dr. Fleming who will admit.





- Lab Data


Result diagrams: 


                                 12/05/20 13:35





                                 12/05/20 13:35


                                   Lab Results











  12/05/20 12/05/20 12/05/20 Range/Units





  13:01 13:35 13:35 


 


WBC   3.9   (3.8-10.6)  k/uL


 


RBC   4.52   (4.30-5.90)  m/uL


 


Hgb   14.1   (13.0-17.5)  gm/dL


 


Hct   40.7   (39.0-53.0)  %


 


MCV   90.2   (80.0-100.0)  fL


 


MCH   31.1   (25.0-35.0)  pg


 


MCHC   34.5   (31.0-37.0)  g/dL


 


RDW   12.0   (11.5-15.5)  %


 


Plt Count   166   (150-450)  k/uL


 


MPV   7.3   


 


Neutrophils %   73   %


 


Lymphocytes %   18   %


 


Monocytes %   6   %


 


Eosinophils %   0   %


 


Basophils %   2   %


 


Neutrophils #   2.8   (1.3-7.7)  k/uL


 


Lymphocytes #   0.7 L   (1.0-4.8)  k/uL


 


Monocytes #   0.2   (0-1.0)  k/uL


 


Eosinophils #   0.0   (0-0.7)  k/uL


 


Basophils #   0.1   (0-0.2)  k/uL


 


PT    11.2  (9.0-12.0)  sec


 


INR    1.1  (<1.2)  


 


APTT    29.2  (22.0-30.0)  sec


 


Sodium     (137-145)  mmol/L


 


Potassium     (3.5-5.1)  mmol/L


 


Chloride     ()  mmol/L


 


Carbon Dioxide     (22-30)  mmol/L


 


Anion Gap     mmol/L


 


BUN     (9-20)  mg/dL


 


Creatinine     (0.66-1.25)  mg/dL


 


Est GFR (CKD-EPI)AfAm     (>60 ml/min/1.73 sqM)  


 


Est GFR (CKD-EPI)NonAf     (>60 ml/min/1.73 sqM)  


 


Glucose     (74-99)  mg/dL


 


POC Glucose (mg/dL)  343 H    (75-99)  mg/dL


 


POC Glu Operater ID  Bharti Escobar    


 


Plasma Lactic Acid David     (0.7-2.0)  mmol/L


 


Calcium     (8.4-10.2)  mg/dL


 


Magnesium     (1.6-2.3)  mg/dL


 


Total Bilirubin     (0.2-1.3)  mg/dL


 


AST     (17-59)  U/L


 


ALT     (4-49)  U/L


 


Alkaline Phosphatase     ()  U/L


 


Troponin I     (0.000-0.034)  ng/mL


 


Total Protein     (6.3-8.2)  g/dL


 


Albumin     (3.5-5.0)  g/dL


 


Acetone, Qual     (Negative)  


 


Coronavirus (PCR)     (Not Detectd)  














  12/05/20 12/05/20 12/05/20 Range/Units





  13:35 13:35 13:35 


 


WBC     (3.8-10.6)  k/uL


 


RBC     (4.30-5.90)  m/uL


 


Hgb     (13.0-17.5)  gm/dL


 


Hct     (39.0-53.0)  %


 


MCV     (80.0-100.0)  fL


 


MCH     (25.0-35.0)  pg


 


MCHC     (31.0-37.0)  g/dL


 


RDW     (11.5-15.5)  %


 


Plt Count     (150-450)  k/uL


 


MPV     


 


Neutrophils %     %


 


Lymphocytes %     %


 


Monocytes %     %


 


Eosinophils %     %


 


Basophils %     %


 


Neutrophils #     (1.3-7.7)  k/uL


 


Lymphocytes #     (1.0-4.8)  k/uL


 


Monocytes #     (0-1.0)  k/uL


 


Eosinophils #     (0-0.7)  k/uL


 


Basophils #     (0-0.2)  k/uL


 


PT     (9.0-12.0)  sec


 


INR     (<1.2)  


 


APTT     (22.0-30.0)  sec


 


Sodium  131 L    (137-145)  mmol/L


 


Potassium  3.9    (3.5-5.1)  mmol/L


 


Chloride  103    ()  mmol/L


 


Carbon Dioxide  20 L    (22-30)  mmol/L


 


Anion Gap  8    mmol/L


 


BUN  15    (9-20)  mg/dL


 


Creatinine  0.93    (0.66-1.25)  mg/dL


 


Est GFR (CKD-EPI)AfAm  >90    (>60 ml/min/1.73 sqM)  


 


Est GFR (CKD-EPI)NonAf  83    (>60 ml/min/1.73 sqM)  


 


Glucose  331 H    (74-99)  mg/dL


 


POC Glucose (mg/dL)     (75-99)  mg/dL


 


POC Glu Operater ID     


 


Plasma Lactic Acid David   2.0   (0.7-2.0)  mmol/L


 


Calcium  7.5 L    (8.4-10.2)  mg/dL


 


Magnesium  1.5 L    (1.6-2.3)  mg/dL


 


Total Bilirubin  0.6    (0.2-1.3)  mg/dL


 


AST  30    (17-59)  U/L


 


ALT  17    (4-49)  U/L


 


Alkaline Phosphatase  53    ()  U/L


 


Troponin I    <0.012  (0.000-0.034)  ng/mL


 


Total Protein  6.1 L    (6.3-8.2)  g/dL


 


Albumin  3.3 L    (3.5-5.0)  g/dL


 


Acetone, Qual  Negative    (Negative)  


 


Coronavirus (PCR)     (Not Detectd)  














  12/05/20 Range/Units





  14:06 


 


WBC   (3.8-10.6)  k/uL


 


RBC   (4.30-5.90)  m/uL


 


Hgb   (13.0-17.5)  gm/dL


 


Hct   (39.0-53.0)  %


 


MCV   (80.0-100.0)  fL


 


MCH   (25.0-35.0)  pg


 


MCHC   (31.0-37.0)  g/dL


 


RDW   (11.5-15.5)  %


 


Plt Count   (150-450)  k/uL


 


MPV   


 


Neutrophils %   %


 


Lymphocytes %   %


 


Monocytes %   %


 


Eosinophils %   %


 


Basophils %   %


 


Neutrophils #   (1.3-7.7)  k/uL


 


Lymphocytes #   (1.0-4.8)  k/uL


 


Monocytes #   (0-1.0)  k/uL


 


Eosinophils #   (0-0.7)  k/uL


 


Basophils #   (0-0.2)  k/uL


 


PT   (9.0-12.0)  sec


 


INR   (<1.2)  


 


APTT   (22.0-30.0)  sec


 


Sodium   (137-145)  mmol/L


 


Potassium   (3.5-5.1)  mmol/L


 


Chloride   ()  mmol/L


 


Carbon Dioxide   (22-30)  mmol/L


 


Anion Gap   mmol/L


 


BUN   (9-20)  mg/dL


 


Creatinine   (0.66-1.25)  mg/dL


 


Est GFR (CKD-EPI)AfAm   (>60 ml/min/1.73 sqM)  


 


Est GFR (CKD-EPI)NonAf   (>60 ml/min/1.73 sqM)  


 


Glucose   (74-99)  mg/dL


 


POC Glucose (mg/dL)   (75-99)  mg/dL


 


POC Glu Operater ID   


 


Plasma Lactic Acid David   (0.7-2.0)  mmol/L


 


Calcium   (8.4-10.2)  mg/dL


 


Magnesium   (1.6-2.3)  mg/dL


 


Total Bilirubin   (0.2-1.3)  mg/dL


 


AST   (17-59)  U/L


 


ALT   (4-49)  U/L


 


Alkaline Phosphatase   ()  U/L


 


Troponin I   (0.000-0.034)  ng/mL


 


Total Protein   (6.3-8.2)  g/dL


 


Albumin   (3.5-5.0)  g/dL


 


Acetone, Qual   (Negative)  


 


Coronavirus (PCR)  Detected A  (Not Detectd)  














Disposition


Clinical Impression: 


 Dehydration, Hyperglycemia, COVID-19





Disposition: ADMITTED AS IP TO THIS [facility]


Condition: Stable


Is patient prescribed a controlled substance at d/c from ED?: No


Referrals: 


Nadine Stovall DO [Primary Care Provider] - 1-2 days


Decision to Admit Reason: Admit from EC


Decision Date: 12/05/20


Decision Time: 15:37

## 2020-12-05 NOTE — P.HPIM
History of Present Illness


H&P Date: 12/05/20


Chief Complaint: Fatigue and generalized weakness





Mr. Chavez is a 70-year-old male,  who is a patient of Dr. Stovall with a past 

medical history of hypertension,MI coming in with a chief complaint of fatigue 

and generalized weakness that have been ongoing for past 1 week.  Patient states

that he has been having poor appetite loss of smell and loss of taste for the 

past 1 week.  He states that he has been sleeping for more than 20 hours a day. 

Patient denies having any cough or difficulty in breathing.  He denies having 

any chest pain or palpitations.  He denies having any dysuria or hematuria.  

Patient denies having any history of diabetes but his blood sugars are in 300s 

to 400s.  In the emergency room patient had a chest x-ray showing no acute 

cardiopulmonary process and an EKG showing normal sinus rhythm.  He had labs 

done showing white count of 3.9, hemoglobin 14.4, platelets 166.  Sodium 131, 

potassium 3.9, chloride.  BUN 15, creatinine 0.93 C-reactive protein 59, albumin

3.3 coronavirus PCR positive. 





Review of Systems





Constitutional: + ve for generalized weakness 


HEENT: Denies blurred vision, vision changes, or eye pain.  


GI: Patient denied nausea vomiting and diarrhea and abdominal pain.


Cardiovascular: Patient denies any chest pain or short of breath no 

palpitations.


Respiratory: patient denied any cough or sputum production.  No shortness of 

breath


Neurologic: Patient denied any numbness or tingling headache.


Musculoskeletal: myalgia


Skin: No rash 


Psychiatric: No anxiety or depression


Endocrine: No heat or cold intolerance.  


Genitourinary: No dysuria or hematuria.


All other 14 point ROS negative except the above





Past Medical History


Past Medical History: Hypertension, Myocardial Infarction (MI)


Additional Past Medical History / Comment(s): Gout


Last Myocardial Infarction Date:: 7\21\2014


History of Any Multi-Drug Resistant Organisms: None Reported


Past Surgical History: Appendectomy


Additional Past Surgical History / Comment(s): pt states appendectomy 30-40 

years ago.  pt states he doesn't know when his last MI was, because he was in 

shelter at the time he also said it was about 6 years ago.


Past Psychological History: No Psychological Hx Reported


Smoking Status: Former smoker


Past Alcohol Use History: None Reported


Past Drug Use History: None Reported





Medications and Allergies


                                Home Medications











 Medication  Instructions  Recorded  Confirmed  Type


 


Allopurinol [Zyloprim] 300 mg PO DAILY 07/21/20 12/05/20 History


 


amLODIPine [Norvasc] 5 mg PO BID 07/21/20 12/05/20 History


 


Metoprolol Succinate [Toprol XL] 50 mg PO DAILY 12/05/20 12/05/20 History


 


Naproxen Sodium [Naprelan] 500 mg PO BID PRN 12/05/20 12/05/20 History


 


Omeprazole 20 mg PO DAILY 12/05/20 12/05/20 History


 


Rivaroxaban [Xarelto] 20 mg PO DAILY 12/05/20 12/05/20 History








                                    Allergies











Allergy/AdvReac Type Severity Reaction Status Date / Time


 


No Known Allergies Allergy   Verified 12/05/20 15:10














Physical Exam


Vitals: 


                                   Vital Signs











  Temp Pulse Pulse Resp BP BP Pulse Ox


 


 12/05/20 21:50  101.4 F H   85    142/79  93 L


 


 12/05/20 21:16  102.8 F H      


 


 12/05/20 21:00   87   16  137/71   92 L


 


 12/05/20 20:11     16   


 


 12/05/20 14:38   87   18  150/94   96


 


 12/05/20 12:53  99.7 F H  90   18  145/83   95








                                Intake and Output











 12/05/20 12/05/20 12/06/20





 14:59 22:59 06:59


 


Other:   


 


  Weight 113.398 kg  














PHYSICAL EXAMINATION: 





Patient is lying in the bed comfortably, no acute distress, awake alert and 

oriented. 


HEENT: Normocephalic. Neck is supple. Pupils reactive. Nostrils clear. Oral 

cavity is moist. 


Neck reveals no JVD, carotid bruits, or thyromegaly. 


CHEST EXAMINATION: Trachea is central. Symmetrical expansion. Lung fields clear 

to auscultation and percussion. 


CARDIAC: Normal S1, S2 with no gallops. No murmurs 


ABDOMEN: Soft. Bowel sounds normal. No organomegaly. No abdominal bruits. 


Extremities: reveal no edema.  No clubbing or cyanosis


Neurologically awake, alert, oriented x3 with well-coordinated movements.  No 

focal deficits noted


Skin: No rash or skin lesions. 


Psychiatric: Coperative.  normal mood an effect


Musculoskeletal: No joint swelling or deformity.  Normal range of motion.





Results


CBC & Chem 7: 


                                 12/05/20 13:35





                                 12/05/20 13:35


Labs: 


                  Abnormal Lab Results - Last 24 Hours (Table)











  12/05/20 12/05/20 12/05/20 Range/Units





  13:01 13:35 13:35 


 


Lymphocytes #   0.7 L   (1.0-4.8)  k/uL


 


Sodium     (137-145)  mmol/L


 


Carbon Dioxide     (22-30)  mmol/L


 


Glucose     (74-99)  mg/dL


 


POC Glucose (mg/dL)  343 H    (75-99)  mg/dL


 


Calcium     (8.4-10.2)  mg/dL


 


Magnesium     (1.6-2.3)  mg/dL


 


C-Reactive Protein     (<10.0)  mg/L


 


Total Protein     (6.3-8.2)  g/dL


 


Albumin     (3.5-5.0)  g/dL


 


Ur Specific Gravity    1.038 H  (1.001-1.035)  


 


Urine Protein    1+ H  (Negative)  


 


Urine Glucose (UA)    4+ H  (Negative)  


 


Urine Ketones    2+ H  (Negative)  


 


Urine Blood    Trace H  (Negative)  


 


Urine Mucus    Rare H  (None)  /hpf


 


Coronavirus (PCR)     (Not Detectd)  














  12/05/20 12/05/20 12/05/20 Range/Units





  13:35 13:35 14:06 


 


Lymphocytes #     (1.0-4.8)  k/uL


 


Sodium  131 L    (137-145)  mmol/L


 


Carbon Dioxide  20 L    (22-30)  mmol/L


 


Glucose  331 H    (74-99)  mg/dL


 


POC Glucose (mg/dL)     (75-99)  mg/dL


 


Calcium  7.5 L    (8.4-10.2)  mg/dL


 


Magnesium  1.5 L    (1.6-2.3)  mg/dL


 


C-Reactive Protein   59.0 H   (<10.0)  mg/L


 


Total Protein  6.1 L    (6.3-8.2)  g/dL


 


Albumin  3.3 L    (3.5-5.0)  g/dL


 


Ur Specific Gravity     (1.001-1.035)  


 


Urine Protein     (Negative)  


 


Urine Glucose (UA)     (Negative)  


 


Urine Ketones     (Negative)  


 


Urine Blood     (Negative)  


 


Urine Mucus     (None)  /hpf


 


Coronavirus (PCR)    Detected A  (Not Detectd)  














Assessment and Plan


Assessment: 





ASSESSMENT





Fatigue generalized weakness secondary to Covid infection


Hyperglycemia


Hyponatremia due to dehydration


Hypomagnesemia


Elevated inflammatory markers


Hypertension


History of MI





PLAN: We will start the patient on Decadron and remdesivir.  As the patient's 

blood sugars have been running high, will add moderate sliding scale of insulin.

  Pulmonary consult will be obtained.  Patient on Xarelto, his home medication 

for DVT prophylaxis.  Protonix for GI prophylaxis.  Tylenol for fevers.  

Continue with the rest of his current medication regimen.  Overall prognosis is 

guarded.  Further recommendations depending on the progress of the patient.

## 2020-12-05 NOTE — XR
EXAMINATION TYPE: XR chest 2V

 

DATE OF EXAM: 12/5/2020

 

COMPARISON: 9/29/2020

 

HISTORY: Weakness

 

TECHNIQUE: 2 views

 

FINDINGS: Heart and mediastinum are normal. Lungs are clear. Diaphragm is normal. Bony thorax is norm
al. There are chest leads.

 

IMPRESSION: Normal chest. Normal heart. No change.

## 2020-12-06 LAB
GLUCOSE BLD-MCNC: 223 MG/DL (ref 75–99)
GLUCOSE BLD-MCNC: 248 MG/DL (ref 75–99)
GLUCOSE BLD-MCNC: 252 MG/DL (ref 75–99)
GLUCOSE BLD-MCNC: 289 MG/DL (ref 75–99)
HBA1C MFR BLD: 14 % (ref 4–6)

## 2020-12-06 RX ADMIN — ACETAMINOPHEN PRN MG: 325 TABLET, FILM COATED ORAL at 17:02

## 2020-12-06 RX ADMIN — INSULIN ASPART SCH UNIT: 100 INJECTION, SOLUTION INTRAVENOUS; SUBCUTANEOUS at 20:12

## 2020-12-06 RX ADMIN — INSULIN ASPART SCH UNIT: 100 INJECTION, SOLUTION INTRAVENOUS; SUBCUTANEOUS at 07:38

## 2020-12-06 RX ADMIN — INSULIN ASPART SCH UNIT: 100 INJECTION, SOLUTION INTRAVENOUS; SUBCUTANEOUS at 12:39

## 2020-12-06 RX ADMIN — ACETAMINOPHEN PRN MG: 325 TABLET, FILM COATED ORAL at 10:16

## 2020-12-06 RX ADMIN — INSULIN ASPART SCH UNIT: 100 INJECTION, SOLUTION INTRAVENOUS; SUBCUTANEOUS at 17:03

## 2020-12-06 RX ADMIN — PANTOPRAZOLE SODIUM SCH MG: 40 TABLET, DELAYED RELEASE ORAL at 07:35

## 2020-12-06 RX ADMIN — RIVAROXABAN SCH MG: 20 TABLET, FILM COATED ORAL at 17:06

## 2020-12-06 RX ADMIN — METOPROLOL SUCCINATE SCH MG: 50 TABLET, EXTENDED RELEASE ORAL at 07:35

## 2020-12-07 LAB
ANION GAP SERPL CALC-SCNC: 11.1 MMOL/L (ref 4–12)
BASOPHILS # BLD AUTO: 0 K/UL (ref 0–0.2)
BASOPHILS NFR BLD AUTO: 0 %
BUN SERPL-SCNC: 23 MG/DL (ref 9–27)
BUN/CREAT SERPL: 23 RATIO (ref 12–20)
CALCIUM SPEC-MCNC: 9.1 MG/DL (ref 8.7–10.3)
CHLORIDE SERPL-SCNC: 102 MMOL/L (ref 96–109)
CO2 SERPL-SCNC: 22.9 MMOL/L (ref 21.6–31.8)
EOSINOPHIL # BLD AUTO: 0 K/UL (ref 0–0.7)
EOSINOPHIL NFR BLD AUTO: 0 %
ERYTHROCYTE [DISTWIDTH] IN BLOOD BY AUTOMATED COUNT: 4.6 M/UL (ref 4.3–5.9)
ERYTHROCYTE [DISTWIDTH] IN BLOOD: 12.1 % (ref 11.5–15.5)
GLUCOSE BLD-MCNC: 266 MG/DL (ref 75–99)
GLUCOSE BLD-MCNC: 277 MG/DL (ref 75–99)
GLUCOSE BLD-MCNC: 286 MG/DL (ref 75–99)
GLUCOSE BLD-MCNC: 295 MG/DL (ref 75–99)
GLUCOSE SERPL-MCNC: 288 MG/DL (ref 70–110)
HCT VFR BLD AUTO: 41.9 % (ref 39–53)
HGB BLD-MCNC: 14.4 GM/DL (ref 13–17.5)
LDH SPEC-CCNC: 238 U/L (ref 120–246)
LYMPHOCYTES # SPEC AUTO: 0.5 K/UL (ref 1–4.8)
LYMPHOCYTES NFR SPEC AUTO: 17 %
MCH RBC QN AUTO: 31.2 PG (ref 25–35)
MCHC RBC AUTO-ENTMCNC: 34.3 G/DL (ref 31–37)
MCV RBC AUTO: 90.9 FL (ref 80–100)
MONOCYTES # BLD AUTO: 0.1 K/UL (ref 0–1)
MONOCYTES NFR BLD AUTO: 4 %
NEUTROPHILS # BLD AUTO: 2.2 K/UL (ref 1.3–7.7)
NEUTROPHILS NFR BLD AUTO: 78 %
PLATELET # BLD AUTO: 189 K/UL (ref 150–450)
POTASSIUM SERPL-SCNC: 4.6 MMOL/L (ref 3.5–5.5)
SODIUM SERPL-SCNC: 136 MMOL/L (ref 135–145)
WBC # BLD AUTO: 2.8 K/UL (ref 3.8–10.6)

## 2020-12-07 RX ADMIN — INSULIN ASPART SCH UNIT: 100 INJECTION, SOLUTION INTRAVENOUS; SUBCUTANEOUS at 21:00

## 2020-12-07 RX ADMIN — INSULIN ASPART SCH UNIT: 100 INJECTION, SOLUTION INTRAVENOUS; SUBCUTANEOUS at 11:57

## 2020-12-07 RX ADMIN — PANTOPRAZOLE SODIUM SCH MG: 40 TABLET, DELAYED RELEASE ORAL at 07:27

## 2020-12-07 RX ADMIN — INSULIN ASPART SCH UNIT: 100 INJECTION, SOLUTION INTRAVENOUS; SUBCUTANEOUS at 07:27

## 2020-12-07 RX ADMIN — INSULIN ASPART SCH UNIT: 100 INJECTION, SOLUTION INTRAVENOUS; SUBCUTANEOUS at 17:37

## 2020-12-07 RX ADMIN — METOPROLOL SUCCINATE SCH MG: 50 TABLET, EXTENDED RELEASE ORAL at 07:27

## 2020-12-07 RX ADMIN — ACETAMINOPHEN PRN MG: 325 TABLET, FILM COATED ORAL at 22:54

## 2020-12-07 RX ADMIN — RIVAROXABAN SCH MG: 20 TABLET, FILM COATED ORAL at 17:37

## 2020-12-07 NOTE — P.PN
Subjective


Progress Note Date: 12/07/20


Principal diagnosis: 





COVID Pneumonia 





Mr. Chavez is a 70-year-old male,  who is a patient of Dr. Stovall with a past 

medical history of hypertension,MI coming in with a chief complaint of fatigue 

and generalized weakness that have been ongoing for past 1 week.  Patient states

that he has been having poor appetite loss of smell and loss of taste for the 

past 1 week.  He states that he has been sleeping for more than 20 hours a day. 

Patient denies having any cough or difficulty in breathing.  He denies having 

any chest pain or palpitations.  He denies having any dysuria or hematuria.  

Patient denies having any history of diabetes but his blood sugars are in 300s 

to 400s.  In the emergency room patient had a chest x-ray showing no acute 

cardiopulmonary process and an EKG showing normal sinus rhythm.  He had labs 

done showing white count of 3.9, hemoglobin 14.4, platelets 166.  Sodium 131, 

potassium 3.9, chloride.  BUN 15, creatinine 0.93 C-reactive protein 59, albumin

3.3 coronavirus PCR positive. 





On 12/6/2020 patient was seen and examined.  He is currently resting comfortably

in bed.  Appears to be no acute distress.  On reviewing the vitals patient 

continues to fever as high as 102.3.  His blood pressure has been stable around 

130s 80s and he saturating at 95% on room air.  Patient's blood sugars have been

running on the higher side.





On 12/07/2020- patient was seen and examined and the general medical floors.  He

is resting comfortably in bed.  Overnight active issues reported by nursing 

staff. Patient states that his difficulty in breathing is improving.  On 

reviewing the vitals patient's T-max is 98.4, heart rate 72, respiratory rate 

127-79 and saturating at 93% on room air.  On reviewing vitals patient's white 

count of 2.8, hemoglobin 14.4, platelets 189.  Sodium 136, potassium 4.6, 

chloride 102.  BUN 23 and creatinine of 1.0.  Blood sugars have been running 

high in 200s to 300s.  C-reactive protein 7.5.





Active Medications





Acetaminophen (Acetaminophen Tab 325 Mg Tab)  650 mg PO Q6HR PRN


   PRN Reason: Mild Pain or Fever > 100.5


   Last Admin: 12/06/20 17:02 Dose:  650 mg


   Documented by: 


Allopurinol (Allopurinol 300 Mg Tab)  300 mg PO DAILY Count includes the [facility]


   Last Admin: 12/07/20 07:27 Dose:  300 mg


   Documented by: 


Amlodipine Besylate (Amlodipine 5 Mg Tab)  5 mg PO BID Count includes the [facility]


   Last Admin: 12/07/20 07:27 Dose:  5 mg


   Documented by: 


Insulin Aspart (Insulin Aspart (Novolog) 100 Unit/Ml Vial)  0 unit SQ ACHS Count includes the [facility]; 

Protocol


   Last Admin: 12/07/20 11:57 Dose:  7 unit


   Documented by: 


Insulin Detemir (Insulin Detemir (Levemir) 100 Unit/Ml Syr)  10 unit SQ 

DAILY@0700 Count includes the [facility]


   Last Admin: 12/07/20 11:57 Dose:  10 unit


   Documented by: 


Metoprolol Succinate (Metoprolol Succinate (Er) 50 Mg Tab.Er.24h)  50 mg PO 

DAILY Count includes the [facility]


   Last Admin: 12/07/20 07:27 Dose:  50 mg


   Documented by: 


Naloxone HCl (Naloxone 0.4 Mg/Ml 1 Ml Vial)  0.2 mg IV Q2M PRN


   PRN Reason: Opioid Reversal


Naproxen (Naproxen 250 Mg Tab)  500 mg PO BID PRN


   PRN Reason: Pain


Pantoprazole Sodium (Pantoprazole 40 Mg Tablet)  40 mg PO DAILY Count includes the [facility]


   Last Admin: 12/07/20 07:27 Dose:  40 mg


   Documented by: 


Rivaroxaban (Rivaroxaban 20 Mg Tab)  20 mg PO DAILY@1700 Count includes the [facility]


   Last Admin: 12/06/20 17:06 Dose:  20 mg


   Documented by: 

















Objective





- Vital Signs


Vital signs: 


                                   Vital Signs











Temp  98.3 F   12/07/20 10:00


 


Pulse  71   12/07/20 10:00


 


Resp  20   12/07/20 10:00


 


BP  144/79   12/07/20 10:00


 


Pulse Ox  93 L  12/07/20 10:00








                                 Intake & Output











 12/06/20 12/07/20 12/07/20





 18:59 06:59 18:59


 


Intake Total 540  


 


Balance 540  


 


Weight   113.398 kg


 


Intake:   


 


  Oral 540  


 


Other:   


 


  Voiding Method  Toilet 


 


  # Voids 2 3 














- Exam





PHYSICAL EXAMINATION: 





Patient is lying in the bed comfortably, no acute distress, awake alert and 

oriented. 


HEENT: Normocephalic. Neck is supple. Pupils reactive. Nostrils clear. Oral 

cavity is moist. 


Neck reveals no JVD, carotid bruits, or thyromegaly. 


CHEST EXAMINATION: Trachea is central. Lung fields clear to auscultation and 

percussion. 


CARDIAC: Normal S1, S2 with no gallops. No murmurs 


ABDOMEN: Soft. Bowel sounds normal. No organomegaly. No abdominal bruits. 


Extremities: reveal no edema.  No clubbing or cyanosis


Neurologically awake, alert, oriented x3 with well-coordinated movements.  No 

focal deficits noted








- Labs


CBC & Chem 7: 


                                 12/07/20 06:51





                                 12/07/20 06:51


Labs: 


                  Abnormal Lab Results - Last 24 Hours (Table)











  12/05/20 12/06/20 12/06/20 Range/Units





  13:35 16:46 20:04 


 


WBC     (3.8-10.6)  k/uL


 


Lymphocytes #     (1.0-4.8)  k/uL


 


BUN/Creatinine Ratio     (12.00-20.00)  Ratio


 


Glucose     ()  mg/dL


 


POC Glucose (mg/dL)   252 H  289 H  (75-99)  mg/dL


 


Hemoglobin A1c  14.0 H    (4.0-6.0)  %


 


C-Reactive Protein     (0.0-0.8)  mg/dL














  12/07/20 12/07/20 12/07/20 Range/Units





  06:51 06:51 07:16 


 


WBC  2.8 L    (3.8-10.6)  k/uL


 


Lymphocytes #  0.5 L    (1.0-4.8)  k/uL


 


BUN/Creatinine Ratio   23.00 H   (12.00-20.00)  Ratio


 


Glucose   288 H   ()  mg/dL


 


POC Glucose (mg/dL)    295 H  (75-99)  mg/dL


 


Hemoglobin A1c     (4.0-6.0)  %


 


C-Reactive Protein   7.5 H   (0.0-0.8)  mg/dL














  12/07/20 Range/Units





  11:10 


 


WBC   (3.8-10.6)  k/uL


 


Lymphocytes #   (1.0-4.8)  k/uL


 


BUN/Creatinine Ratio   (12.00-20.00)  Ratio


 


Glucose   ()  mg/dL


 


POC Glucose (mg/dL)  277 H  (75-99)  mg/dL


 


Hemoglobin A1c   (4.0-6.0)  %


 


C-Reactive Protein   (0.0-0.8)  mg/dL








                      Microbiology - Last 24 Hours (Table)











 12/05/20 13:35 Blood Culture - Preliminary





 Blood    No Growth after 24 hours














Assessment and Plan


Assessment: 





ASSESSMENT





Fatigue generalized weakness secondary to Covid infection


Hyperglycemia


Hyponatremia due to dehydration


Hypomagnesemia


Elevated inflammatory markers


Hypertension


History of MI





PLAN: Patient has been afebrile for the past 24 hours.  He is currently 

saturating at 93% on room air.  Patient's blood sugars have been running on the 

higher side, adjusting the dose of insulin.  Decadron was discontinued. Patient 

on Xarelto, his home medication for DVT prophylaxis.  Protonix for GI 

prophylaxis.  Continue with the rest of his current medication regimen.  Overall

prognosis is guarded.  Further recommendations depending on the progress of the 

patient.

## 2020-12-07 NOTE — P.PN
Subjective


Progress Note Date: 12/06/20


Principal diagnosis: 





COVID Pneumonia 





Mr. Chavez is a 70-year-old male,  who is a patient of Dr. Stovall with a past 

medical history of hypertension,MI coming in with a chief complaint of fatigue 

and generalized weakness that have been ongoing for past 1 week.  Patient states

that he has been having poor appetite loss of smell and loss of taste for the 

past 1 week.  He states that he has been sleeping for more than 20 hours a day. 

Patient denies having any cough or difficulty in breathing.  He denies having 

any chest pain or palpitations.  He denies having any dysuria or hematuria.  

Patient denies having any history of diabetes but his blood sugars are in 300s 

to 400s.  In the emergency room patient had a chest x-ray showing no acute 

cardiopulmonary process and an EKG showing normal sinus rhythm.  He had labs 

done showing white count of 3.9, hemoglobin 14.4, platelets 166.  Sodium 131, 

potassium 3.9, chloride.  BUN 15, creatinine 0.93 C-reactive protein 59, albumin

3.3 coronavirus PCR positive. 





On 12/6/2020 patient was seen and examined.  He is currently resting comfortably

in bed.  Appears to be no acute distress.  On reviewing the vitals patient 

continues to fever as high as 102.3.  His blood pressure has been stable around 

130s 80s and he saturating at 95% on room air.  Patient's blood sugars have been

running on the higher side.





Active Medications





Acetaminophen (Acetaminophen Tab 325 Mg Tab)  650 mg PO Q6HR PRN


   PRN Reason: Mild Pain or Fever > 100.5


   Last Admin: 12/06/20 17:02 Dose:  650 mg


   Documented by: 


Allopurinol (Allopurinol 300 Mg Tab)  300 mg PO DAILY [facility]


   Last Admin: 12/06/20 07:34 Dose:  300 mg


   Documented by: 


Amlodipine Besylate (Amlodipine 5 Mg Tab)  5 mg PO BID [facility]


   Last Admin: 12/06/20 20:13 Dose:  5 mg


   Documented by: 


Dexamethasone (Dexamethasone 2 Mg Tab)  6 mg PO DAILY [facility]


   Last Admin: 12/06/20 17:03 Dose:  6 mg


   Documented by: 


Insulin Aspart (Insulin Aspart (Novolog) 100 Unit/Ml Vial)  0 unit SQ ACHS [facility]; 

Protocol


   Last Admin: 12/06/20 20:12 Dose:  7 unit


   Documented by: 


Metoprolol Succinate (Metoprolol Succinate (Er) 50 Mg Tab.Er.24h)  50 mg PO 

DAILY [facility]


   Last Admin: 12/06/20 07:35 Dose:  50 mg


   Documented by: 


Naloxone HCl (Naloxone 0.4 Mg/Ml 1 Ml Vial)  0.2 mg IV Q2M PRN


   PRN Reason: Opioid Reversal


Naproxen (Naproxen 250 Mg Tab)  500 mg PO BID PRN


   PRN Reason: Pain


Pantoprazole Sodium (Pantoprazole 40 Mg Tablet)  40 mg PO DAILY [facility]


   Last Admin: 12/06/20 07:35 Dose:  40 mg


   Documented by: 


Rivaroxaban (Rivaroxaban 20 Mg Tab)  20 mg PO DAILY@1700 [facility]


   Last Admin: 12/06/20 17:06 Dose:  20 mg


   Documented by: 











Objective





- Vital Signs


Vital signs: 


                                   Vital Signs











Temp  102.3 F H  12/06/20 16:49


 


Pulse  89   12/06/20 16:49


 


Resp  18   12/06/20 16:49


 


BP  135/75   12/06/20 16:49


 


Pulse Ox  94 L  12/06/20 16:49








                                 Intake & Output











 12/05/20 12/06/20 12/06/20





 18:59 06:59 18:59


 


Weight 113.398 kg 113.398 kg 














- Exam





PHYSICAL EXAMINATION: 





Patient is lying in the bed comfortably, no acute distress, awake alert and 

oriented. 


HEENT: Normocephalic. Neck is supple. Pupils reactive. Nostrils clear. Oral 

cavity is moist. 


Neck reveals no JVD, carotid bruits, or thyromegaly. 


CHEST EXAMINATION: Trachea is central. Symmetrical expansion. Lung fields clear 

to auscultation and percussion. 


CARDIAC: Normal S1, S2 with no gallops. No murmurs 


ABDOMEN: Soft. Bowel sounds normal. No organomegaly. No abdominal bruits. 


Extremities: reveal no edema.  No clubbing or cyanosis


Neurologically awake, alert, oriented x3 with well-coordinated movements.  No 

focal deficits noted


Skin: No rash or skin lesions. 


Psychiatric: Coperative.  normal mood an effect








- Labs


CBC & Chem 7: 


                                 12/05/20 13:35





                                 12/05/20 13:35


Labs: 


                  Abnormal Lab Results - Last 24 Hours (Table)











  12/05/20 12/06/20 12/06/20 Range/Units





  13:35 07:19 11:47 


 


POC Glucose (mg/dL)   248 H  223 H  (75-99)  mg/dL


 


C-Reactive Protein  59.0 H    (<10.0)  mg/L














  12/06/20 Range/Units





  16:46 


 


POC Glucose (mg/dL)  252 H  (75-99)  mg/dL


 


C-Reactive Protein   (<10.0)  mg/L








                      Microbiology - Last 24 Hours (Table)











 12/05/20 13:35 Blood Culture - Preliminary





 Blood    No Growth after 24 hours














Assessment and Plan


Assessment: 





ASSESSMENT





Fatigue generalized weakness secondary to Covid infection


Hyperglycemia


Hyponatremia due to dehydration


Hypomagnesemia


Elevated inflammatory markers


Hypertension


History of MI





PLAN: Patient has been running high fevers, to be continued on Tylenol on as 

needed basis.  Patient to be continued on Decadron.Will adjust the dose of 

insulin depending on the blood sugars. Patient on Xarelto, his home medication 

for DVT prophylaxis.  Protonix for GI prophylaxis.  Continue with the rest of 

his current medication regimen.  Overall prognosis is guarded.  Further 

recommendations depending on the progress of the patient.

## 2020-12-07 NOTE — P.PN
Subjective


Progress Note Date: 12/07/20





on 12/07/2020, the patient is doing well.  No specific complaints.  The patient 

is on room air oxygen.  The patient has an acute corona virus Covid 19 

infection.no reported shortness of breath.  No cough or sputum production.  No 

nausea vomiting or diarrhea or abdominal pain.  No other significant events 

overnight.  Labs are all within normal limits.  He was not found to be a 

candidate for Decadron or Remdesivir





Objective





- Vital Signs


Vital signs: 


                                   Vital Signs











Temp  98.3 F   12/07/20 10:00


 


Pulse  71   12/07/20 10:00


 


Resp  20   12/07/20 10:00


 


BP  144/79   12/07/20 10:00


 


Pulse Ox  93 L  12/07/20 10:00








                                 Intake & Output











 12/06/20 12/07/20 12/07/20





 18:59 06:59 18:59


 


Intake Total 540  


 


Balance 540  


 


Weight   113.398 kg


 


Intake:   


 


  Oral 540  


 


Other:   


 


  Voiding Method  Toilet 


 


  # Voids 2 3 














- Exam








GENERAL EXAM: Alert, pleasant 70-year-old gentleman, on room air, comfortable in

no apparent distress.


HEAD: Normocephalic.


EYES: Normal reaction of pupils, equal size.


NOSE: Clear with pink turbinates.


THROAT: No erythema or exudates.


NECK: No masses, no JVD.


CHEST: No chest wall deformity.


LUNGS: Equal air entry with no crackles, wheeze, rhonchi or dullness.


CVS: S1 and S2 normal with no audible murmur, regular rhythm.


ABDOMEN: No hepatosplenomegaly, normal bowel sounds, no guarding or rigidity.


SPINE: No scoliosis or deformity


SKIN: No rashes


CENTRAL NERVOUS SYSTEM: No focal deficits, tone is normal in all 4 extremities.


EXTREMITIES: There is no peripheral edema.  No clubbing, no cyanosis.  

Peripheral pulses are intact.








- Labs


CBC & Chem 7: 


                                 12/07/20 06:51





                                 12/07/20 06:51


Labs: 


                  Abnormal Lab Results - Last 24 Hours (Table)











  12/05/20 12/06/20 12/06/20 Range/Units





  13:35 11:47 16:46 


 


WBC     (3.8-10.6)  k/uL


 


Lymphocytes #     (1.0-4.8)  k/uL


 


BUN/Creatinine Ratio     (12.00-20.00)  Ratio


 


Glucose     ()  mg/dL


 


POC Glucose (mg/dL)   223 H  252 H  (75-99)  mg/dL


 


Hemoglobin A1c  14.0 H    (4.0-6.0)  %


 


C-Reactive Protein     (0.0-0.8)  mg/dL














  12/06/20 12/07/20 12/07/20 Range/Units





  20:04 06:51 06:51 


 


WBC   2.8 L   (3.8-10.6)  k/uL


 


Lymphocytes #   0.5 L   (1.0-4.8)  k/uL


 


BUN/Creatinine Ratio    23.00 H  (12.00-20.00)  Ratio


 


Glucose    288 H  ()  mg/dL


 


POC Glucose (mg/dL)  289 H    (75-99)  mg/dL


 


Hemoglobin A1c     (4.0-6.0)  %


 


C-Reactive Protein    7.5 H  (0.0-0.8)  mg/dL














  12/07/20 12/07/20 Range/Units





  07:16 11:10 


 


WBC    (3.8-10.6)  k/uL


 


Lymphocytes #    (1.0-4.8)  k/uL


 


BUN/Creatinine Ratio    (12.00-20.00)  Ratio


 


Glucose    ()  mg/dL


 


POC Glucose (mg/dL)  295 H  277 H  (75-99)  mg/dL


 


Hemoglobin A1c    (4.0-6.0)  %


 


C-Reactive Protein    (0.0-0.8)  mg/dL








                      Microbiology - Last 24 Hours (Table)











 12/05/20 13:35 Blood Culture - Preliminary





 Blood    No Growth after 24 hours














Assessment and Plan


Plan: 











1 Generalized weakness, fatigue, poor appetite secondary to new onset diabetes 

mellitus, currently on sliding scale Coverage





2 Acute COVID 19-Corona virus which may be contributing to some of the above





3 Febrile illness secondary to above





4 Known right upper lobe lung nodule being followed in the outpatient setting





5 History of left upper lobe pulmonary embolism, anticoagulated with Xarelto





6 Hypertension





7 Former smoker





8 History of coronary disease





9 History of gout





10 GERD





Plan:





No pulmonary complaints, on room air


Not a candidate for Remdesivir


No dexamethasone


Cleared for discharge from the pulmonary standpoint


tthe patient will most likely need  blood sugar coverage with possibly an oral 

hypoglycemic medication.  I will leave the choice of the medication to the 

medical team.  Otherwise the patient is clear for discharge from a pulmonary 

standpoint.meanwhile, I believe that discontinuing the Decadron will improve the

sugar control.  I'm going to stop the Decadron for now as the patient is 

currently on room air oxygen and he does not have any evidence of pneumonia and 

there is no role for steroids in his condition.


Keep scheduled appointment with Dr. Samaniego and follow-up computed tomography 

scan


Education regarding new-onset diabetes, follow-up within 1-2 days with his PCP

## 2020-12-08 LAB
GLUCOSE BLD-MCNC: 229 MG/DL (ref 75–99)
GLUCOSE BLD-MCNC: 230 MG/DL (ref 75–99)
GLUCOSE BLD-MCNC: 243 MG/DL (ref 75–99)
GLUCOSE BLD-MCNC: 244 MG/DL (ref 75–99)

## 2020-12-08 RX ADMIN — INSULIN DETEMIR SCH UNIT: 100 INJECTION, SOLUTION SUBCUTANEOUS at 08:07

## 2020-12-08 RX ADMIN — INSULIN ASPART SCH UNIT: 100 INJECTION, SOLUTION INTRAVENOUS; SUBCUTANEOUS at 21:30

## 2020-12-08 RX ADMIN — Medication SCH MG: at 23:36

## 2020-12-08 RX ADMIN — PANTOPRAZOLE SODIUM SCH MG: 40 TABLET, DELAYED RELEASE ORAL at 08:09

## 2020-12-08 RX ADMIN — OXYCODONE HYDROCHLORIDE AND ACETAMINOPHEN SCH MG: 500 TABLET ORAL at 23:33

## 2020-12-08 RX ADMIN — CEFAZOLIN SCH MLS/HR: 330 INJECTION, POWDER, FOR SOLUTION INTRAMUSCULAR; INTRAVENOUS at 12:29

## 2020-12-08 RX ADMIN — INSULIN ASPART SCH UNIT: 100 INJECTION, SOLUTION INTRAVENOUS; SUBCUTANEOUS at 17:10

## 2020-12-08 RX ADMIN — INSULIN ASPART SCH UNIT: 100 INJECTION, SOLUTION INTRAVENOUS; SUBCUTANEOUS at 12:29

## 2020-12-08 RX ADMIN — ACETAMINOPHEN SCH MG: 325 TABLET, FILM COATED ORAL at 23:34

## 2020-12-08 RX ADMIN — ACETAMINOPHEN PRN MG: 325 TABLET, FILM COATED ORAL at 08:08

## 2020-12-08 RX ADMIN — DEXTROSE SCH MG: 50 INJECTION, SOLUTION INTRAVENOUS at 23:34

## 2020-12-08 RX ADMIN — RIVAROXABAN SCH MG: 20 TABLET, FILM COATED ORAL at 16:32

## 2020-12-08 RX ADMIN — ACETAMINOPHEN PRN MG: 325 TABLET, FILM COATED ORAL at 17:10

## 2020-12-08 RX ADMIN — METOPROLOL SUCCINATE SCH MG: 50 TABLET, EXTENDED RELEASE ORAL at 08:09

## 2020-12-08 RX ADMIN — INSULIN ASPART SCH UNIT: 100 INJECTION, SOLUTION INTRAVENOUS; SUBCUTANEOUS at 08:08

## 2020-12-08 NOTE — P.PN
Subjective


From Records


Mr. Chavez is a 70-year-old male,  who is a patient of Dr. Stovall with a past 

medical history of hypertension,MI coming in with a chief complaint of fatigue 

and generalized weakness that have been ongoing for past 1 week.  Patient states

that he has been having poor appetite loss of smell and loss of taste for the 

past 1 week.  He states that he has been sleeping for more than 20 hours a day. 

Patient denies having any cough or difficulty in breathing.  He denies having 

any chest pain or palpitations.  He denies having any dysuria or hematuria.  

Patient denies having any history of diabetes but his blood sugars are in 300s 

to 400s.  In the emergency room patient had a chest x-ray showing no acute 

cardiopulmonary process and an EKG showing normal sinus rhythm.  He had labs 

done showing white count of 3.9, hemoglobin 14.4, platelets 166.  Sodium 131, 

potassium 3.9, chloride.  BUN 15, creatinine 0.93 C-reactive protein 59, albumin

3.3 coronavirus PCR positive. 





On 12/6/2020 patient was seen and examined.  He is currently resting comfortably

in bed.  Appears to be no acute distress.  On reviewing the vitals patient guillermo

nues to fever as high as 102.3.  His blood pressure has been stable around 130s 

80s and he saturating at 95% on room air.  Patient's blood sugars have been 

running on the higher side.





On 12/07/2020- patient was seen and examined and the general medical floors.  He

is resting comfortably in bed.  Overnight active issues reported by nursing 

staff. Patient states that his difficulty in breathing is improving.  On 

reviewing the vitals patient's T-max is 98.4, heart rate 72, respiratory rate 

127-79 and saturating at 93% on room air.  On reviewing vitals patient's white 

count of 2.8, hemoglobin 14.4, platelets 189.  Sodium 136, potassium 4.6, 

chloride 102.  BUN 23 and creatinine of 1.0.  Blood sugars have been running 

high in 200s to 300s.  C-reactive protein 7.5.








12/08/2020


This is a pleasant 70 years old male who presents with cough with infection and 

found to have new onset diabetes with elevated hemoglobin A1c at 14%.


Patient currently with no respiratory symptoms, no chest pain or dyspnea 

coughing.  Yesterday he has a regular bowel movement but today he had 1 loose 

bowel movement but no significant abdominal pain or nausea vomiting.


Also he is running a fever of 101.  He saturating 90% at room air.


BMP and CBC were reviewed and they were unremarkable except for mild leukopenia 

2.8K.  D-dimer is negative at 0.34.  Coronavirus is detected.  Chest x-ray is 

normal


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  Normal saline at 75 mL/h was 

added.  Also Levemir 10 units daily and metformin 1000 mg twice daily has been 

added because his sugar still not controlled


Patient is able to eat % of his meal





CONSTITUTIONAL: No fever, no malaise, no fatigue. 


HEENT: No recent visual problems or hearing problems. Denied any sore throat. 


CARDIOVASCULAR: No  orthopnea, PND, no palpitations, no syncope. 


PULMONARY: No shortness of breath, no cough, no hemoptysis. 


GASTROINTESTINAL: No diarrhea, no nausea, no vomiting, no abdominal pain. 

Normoactive bowel sounds. 


NEUROLOGICAL: No headaches, no weakness, no numbness. 


                               Active Medications











Generic Name Dose Route Start Last Admin





  Trade Name Freq  PRN Reason Stop Dose Admin


 


Acetaminophen  650 mg  12/05/20 15:32  12/08/20 17:10





  Acetaminophen Tab 325 Mg Tab  PO   650 mg





  Q6HR PRN   Administration





  Mild Pain or Fever > 100.5  


 


Allopurinol  300 mg  12/06/20 09:00  12/08/20 08:08





  Allopurinol 300 Mg Tab  PO   300 mg





  DAILY STEPH   Administration


 


Amlodipine Besylate  5 mg  12/06/20 09:00  12/08/20 08:09





  Amlodipine 5 Mg Tab  PO   5 mg





  BID STEPH   Administration


 


Sodium Chloride  1,000 mls @ 75 mls/hr  12/08/20 11:00  12/08/20 12:29





  Saline 0.9%  IV   75 mls/hr





  [id number] STEPH   Administration


 


Insulin Aspart  0 unit  12/06/20 07:30  12/08/20 17:10





  Insulin Aspart (Novolog) 100 Unit/Ml Vial  SQ   5 unit





  ACHS STEPH   Administration





  Protocol  


 


Insulin Detemir  10 unit  12/08/20 07:00  12/08/20 08:07





  Insulin Detemir (Levemir) 100 Unit/Ml Syr  SQ   10 unit





  DAILY@0700 STEPH   Administration


 


Metformin HCl  1,000 mg  12/09/20 07:30 





  Metformin 500 Mg Tab  PO  





  BID-W/MEALS STEPH  


 


Metoprolol Succinate  50 mg  12/06/20 09:00  12/08/20 08:09





  Metoprolol Succinate (Er) 50 Mg Tab.Er.24h  PO   50 mg





  DAILY STEPH   Administration


 


Naloxone HCl  0.2 mg  12/05/20 15:32 





  Naloxone 0.4 Mg/Ml 1 Ml Vial  IV  





  Q2M PRN  





  Opioid Reversal  


 


Naproxen  500 mg  12/05/20 23:05 





  Naproxen 250 Mg Tab  PO  





  BID PRN  





  Pain  


 


Pantoprazole Sodium  40 mg  12/06/20 09:00  12/08/20 08:09





  Pantoprazole 40 Mg Tablet  PO   40 mg





  DAILY STEPH   Administration


 


Rivaroxaban  20 mg  12/06/20 17:00  12/08/20 16:32





  Rivaroxaban 20 Mg Tab  PO   20 mg





  DAILY@1700 STEPH   Administration














Objective





- Vital Signs


Vital signs: 


                                   Vital Signs











Temp  100.1 F H  12/08/20 10:00


 


Pulse  81   12/08/20 10:00


 


Resp  16   12/08/20 10:00


 


BP  126/77   12/08/20 10:00


 


Pulse Ox  91 L  12/08/20 10:00








                                 Intake & Output











 12/07/20 12/08/20 12/08/20





 18:59 06:59 18:59


 


Intake Total  400 


 


Balance  400 


 


Weight 113.398 kg  


 


Intake:   


 


  Oral  400 


 


Other:   


 


  # Voids 3 1 














- Exam





GENERAL: The patient is alert and oriented x3, not in any acute distress. Well 

developed, well nourished. 


HEENT: Pupils are round and equally reacting to light. EOMI. No scleral icterus.

No conjunctival pallor. Normocephalic, atraumatic. No pharyngeal erythema. No 

thyromegaly. 


CARDIOVASCULAR: S1 and S2 present. No murmurs, rubs, or gallops. 


PULMONARY: Chest is clear to auscultation, no wheezing or crackles. 


ABDOMEN: Soft, nontender, nondistended, normoactive bowel sounds. No palpable 

organomegaly. 


MUSCULOSKELETAL: No joint swelling or deformity. 


EXTREMITIES: No cyanosis, clubbing, or pedal edema. 


NEUROLOGICAL: Gross neurological examination did not reveal any focal deficits. 


SKIN: No rashes. no petechiae.





- Labs


CBC & Chem 7: 


                                 12/07/20 06:51





                                 12/07/20 06:51


Labs: 


                  Abnormal Lab Results - Last 24 Hours (Table)











  12/07/20 12/07/20 12/08/20 Range/Units





  16:22 20:21 07:22 


 


POC Glucose (mg/dL)  286 H  266 H  243 H  (75-99)  mg/dL














  12/08/20 Range/Units





  11:56 


 


POC Glucose (mg/dL)  229 H  (75-99)  mg/dL








                      Microbiology - Last 24 Hours (Table)











 12/05/20 13:35 Blood Culture - Preliminary





 Blood    No Growth after 48 hours














Assessment and Plan


Assessment: 





Fatigue generalized weakness secondary to Covid infection


New-onset diabetes


Hyponatremia due to dehydration


Dehydration


Gastroenteritis secondary to covid infection


Hypomagnesemia


Hypertension


History of MI


Plan: 





This is a pleasant 70 years old male who presents with complete infection 

without pneumonia, mild gastroenteritis and new diabetes.  Continue with zinc 

and ascorbic acid, continue with gentle hydration.  Continue with metformin 1000

mg twice daily and Amaryl 1 mg daily and also he is on Levemir 10 units daily.


Patient has persistent fever and is not getting Tylenol when necessary, Soma 

going to put him on a small dose of scheduled Tylenol and monitor


Labs and medication were reviewed..  Continue same treatment.  Continue with 

symptomatic treatment.  Resume home medication.  Monitor lytes and vitals.  DVT 

and GI prophylaxis.  Further recommendationsas per clinical course of the 

patient


DVT prophylaxis: Subcutaneous heparin


GI Prophylaxis: Pepcid


Prognosis is guarded

## 2020-12-09 LAB
ALBUMIN SERPL-MCNC: 4.2 G/DL (ref 3.8–4.9)
ALBUMIN/GLOB SERPL: 1.91 G/DL (ref 1.6–3.17)
ALP SERPL-CCNC: 68 U/L (ref 41–126)
ALT SERPL-CCNC: 55 U/L (ref 10–49)
ANION GAP SERPL CALC-SCNC: 17.1 MMOL/L (ref 4–12)
AST SERPL-CCNC: 54 U/L (ref 14–35)
BASOPHILS # BLD AUTO: 0.1 K/UL (ref 0–0.2)
BASOPHILS NFR BLD AUTO: 1 %
BUN SERPL-SCNC: 21 MG/DL (ref 9–27)
BUN/CREAT SERPL: 16.15 RATIO (ref 12–20)
CALCIUM SPEC-MCNC: 9.1 MG/DL (ref 8.7–10.3)
CHLORIDE SERPL-SCNC: 100 MMOL/L (ref 96–109)
CO2 SERPL-SCNC: 17.9 MMOL/L (ref 21.6–31.8)
EOSINOPHIL # BLD AUTO: 0 K/UL (ref 0–0.7)
EOSINOPHIL NFR BLD AUTO: 0 %
ERYTHROCYTE [DISTWIDTH] IN BLOOD BY AUTOMATED COUNT: 4.94 M/UL (ref 4.3–5.9)
ERYTHROCYTE [DISTWIDTH] IN BLOOD: 12.2 % (ref 11.5–15.5)
GLOBULIN SER CALC-MCNC: 2.2 G/DL (ref 1.6–3.3)
GLUCOSE BLD-MCNC: 325 MG/DL (ref 75–99)
GLUCOSE BLD-MCNC: 336 MG/DL (ref 75–99)
GLUCOSE BLD-MCNC: 337 MG/DL (ref 75–99)
GLUCOSE BLD-MCNC: 341 MG/DL (ref 75–99)
GLUCOSE BLD-MCNC: 475 MG/DL (ref 75–99)
GLUCOSE SERPL-MCNC: 354 MG/DL (ref 70–110)
HCT VFR BLD AUTO: 45.6 % (ref 39–53)
HGB BLD-MCNC: 15.5 GM/DL (ref 13–17.5)
LDH SPEC-CCNC: 364 U/L (ref 120–246)
LYMPHOCYTES # SPEC AUTO: 0.7 K/UL (ref 1–4.8)
LYMPHOCYTES NFR SPEC AUTO: 9 %
MAGNESIUM SPEC-SCNC: 1.7 MG/DL (ref 1.5–2.4)
MCH RBC QN AUTO: 31.4 PG (ref 25–35)
MCHC RBC AUTO-ENTMCNC: 34 G/DL (ref 31–37)
MCV RBC AUTO: 92.2 FL (ref 80–100)
MONOCYTES # BLD AUTO: 0.2 K/UL (ref 0–1)
MONOCYTES NFR BLD AUTO: 2 %
NEUTROPHILS # BLD AUTO: 7 K/UL (ref 1.3–7.7)
NEUTROPHILS NFR BLD AUTO: 86 %
PLATELET # BLD AUTO: 296 K/UL (ref 150–450)
POTASSIUM SERPL-SCNC: 4.1 MMOL/L (ref 3.5–5.5)
PROT SERPL-MCNC: 6.4 G/DL (ref 6.2–8.2)
SODIUM SERPL-SCNC: 135 MMOL/L (ref 135–145)
WBC # BLD AUTO: 8.2 K/UL (ref 3.8–10.6)

## 2020-12-09 RX ADMIN — Medication SCH MG: at 07:46

## 2020-12-09 RX ADMIN — METOPROLOL SUCCINATE SCH MG: 50 TABLET, EXTENDED RELEASE ORAL at 07:46

## 2020-12-09 RX ADMIN — INSULIN DETEMIR SCH UNIT: 100 INJECTION, SOLUTION SUBCUTANEOUS at 07:44

## 2020-12-09 RX ADMIN — GLIMEPIRIDE SCH MG: 1 TABLET ORAL at 07:45

## 2020-12-09 RX ADMIN — INSULIN ASPART SCH UNIT: 100 INJECTION, SOLUTION INTRAVENOUS; SUBCUTANEOUS at 07:44

## 2020-12-09 RX ADMIN — CEFAZOLIN SCH MLS/HR: 330 INJECTION, POWDER, FOR SOLUTION INTRAMUSCULAR; INTRAVENOUS at 11:49

## 2020-12-09 RX ADMIN — RIVAROXABAN SCH MG: 20 TABLET, FILM COATED ORAL at 17:27

## 2020-12-09 RX ADMIN — ACETAMINOPHEN SCH MG: 325 TABLET, FILM COATED ORAL at 17:27

## 2020-12-09 RX ADMIN — INSULIN ASPART SCH UNIT: 100 INJECTION, SOLUTION INTRAVENOUS; SUBCUTANEOUS at 11:49

## 2020-12-09 RX ADMIN — ACETAMINOPHEN SCH MG: 325 TABLET, FILM COATED ORAL at 23:30

## 2020-12-09 RX ADMIN — ACETAMINOPHEN SCH MG: 325 TABLET, FILM COATED ORAL at 07:46

## 2020-12-09 RX ADMIN — OXYCODONE HYDROCHLORIDE AND ACETAMINOPHEN SCH MG: 500 TABLET ORAL at 07:45

## 2020-12-09 RX ADMIN — INSULIN ASPART SCH UNIT: 100 INJECTION, SOLUTION INTRAVENOUS; SUBCUTANEOUS at 21:26

## 2020-12-09 RX ADMIN — PANTOPRAZOLE SODIUM SCH MG: 40 TABLET, DELAYED RELEASE ORAL at 07:45

## 2020-12-09 RX ADMIN — DEXTROSE SCH MG: 50 INJECTION, SOLUTION INTRAVENOUS at 07:47

## 2020-12-09 RX ADMIN — REMDESIVIR SCH MLS/HR: 100 INJECTION, POWDER, LYOPHILIZED, FOR SOLUTION INTRAVENOUS at 23:30

## 2020-12-09 RX ADMIN — INSULIN ASPART SCH UNIT: 100 INJECTION, SOLUTION INTRAVENOUS; SUBCUTANEOUS at 17:27

## 2020-12-09 RX ADMIN — ACETAMINOPHEN SCH MG: 325 TABLET, FILM COATED ORAL at 11:48

## 2020-12-09 RX ADMIN — CEFAZOLIN SCH: 330 INJECTION, POWDER, FOR SOLUTION INTRAMUSCULAR; INTRAVENOUS at 02:55

## 2020-12-09 NOTE — XR
EXAMINATION TYPE: XR chest 1V portable

 

DATE OF EXAM: 12/9/2020

 

COMPARISON: 12/5/2020

 

INDICATION: Pneumonia

 

TECHNIQUE: Single frontal view of the chest is obtained.

 

FINDINGS:  

The heart size is normal.  

The pulmonary vasculature is prominent.  

Mild diffuse increased lung markings are in the upper peripheral lung fields, developing from prior s
tudy. Mild left lower lobe infiltrate may be present  

 

 

IMPRESSION:  

1. Patchy increasing lung markings are nonspecific. Correlate for atypical pneumonia. Pulmonary edema
 could be considered.

## 2020-12-09 NOTE — P.PN
Subjective


From Records


Mr. Chavez is a 70-year-old male,  who is a patient of Dr. Stovall with a past 

medical history of hypertension,MI coming in with a chief complaint of fatigue 

and generalized weakness that have been ongoing for past 1 week.  Patient states

that he has been having poor appetite loss of smell and loss of taste for the 

past 1 week.  He states that he has been sleeping for more than 20 hours a day. 

Patient denies having any cough or difficulty in breathing.  He denies having 

any chest pain or palpitations.  He denies having any dysuria or hematuria.  

Patient denies having any history of diabetes but his blood sugars are in 300s 

to 400s.  In the emergency room patient had a chest x-ray showing no acute 

cardiopulmonary process and an EKG showing normal sinus rhythm.  He had labs 

done showing white count of 3.9, hemoglobin 14.4, platelets 166.  Sodium 131, 

potassium 3.9, chloride.  BUN 15, creatinine 0.93 C-reactive protein 59, albumin

3.3 coronavirus PCR positive. 





On 12/6/2020 patient was seen and examined.  He is currently resting comfortably

in bed.  Appears to be no acute distress.  On reviewing the vitals patient guillermo

nues to fever as high as 102.3.  His blood pressure has been stable around 130s 

80s and he saturating at 95% on room air.  Patient's blood sugars have been 

running on the higher side.





On 12/07/2020- patient was seen and examined and the general medical floors.  He

is resting comfortably in bed.  Overnight active issues reported by nursing 

staff. Patient states that his difficulty in breathing is improving.  On 

reviewing the vitals patient's T-max is 98.4, heart rate 72, respiratory rate 

127-79 and saturating at 93% on room air.  On reviewing vitals patient's white 

count of 2.8, hemoglobin 14.4, platelets 189.  Sodium 136, potassium 4.6, 

chloride 102.  BUN 23 and creatinine of 1.0.  Blood sugars have been running 

high in 200s to 300s.  C-reactive protein 7.5.








12/08/2020


This is a pleasant 70 years old male who presents with cough with infection and 

found to have new onset diabetes with elevated hemoglobin A1c at 14%.


Patient currently with no respiratory symptoms, no chest pain or dyspnea 

coughing.  Yesterday he has a regular bowel movement but today he had 1 loose 

bowel movement but no significant abdominal pain or nausea vomiting.


Also he is running a fever of 101.  He saturating 90% at room air.


BMP and CBC were reviewed and they were unremarkable except for mild leukopenia 

2.8K.  D-dimer is negative at 0.34.  Coronavirus is detected.  Chest x-ray is 

normal


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  Normal saline at 75 mL/h was 

added.  Also Levemir 10 units daily and metformin 1000 mg twice daily has been 

added because his sugar still not controlled


Patient is able to eat % of his meal





12/9/20


Patient had a rough night because of fever, his breathing is quiet and stable 

and he's only on 2 L oxygen.  Is still have diarrhea about twice per day which 

is similar to the day before.  No abdominal pain or vomiting.  No significant 

coughing.  unstable


Chest x-ray: Worsening infiltrates, Pronecalcitonin is elevated at 0.27, 

patient is started on Rocephin and Zithromax.


He is new onset diabetes and also is on dexamethasone and his sugar more than 

300, so Levemir was decreased from 10-30 units daily.  Also he is on metformin 

1000 and Amaryl 1 mg.


Continue on Xarelto, normocephalic 75, remedisivr, dexamethasone 6 mg daily.











CONSTITUTIONAL: No fever, no malaise, no fatigue. 


HEENT: No recent visual problems or hearing problems. Denied any sore throat. 


CARDIOVASCULAR: No  orthopnea, PND, no palpitations, no syncope. 


PULMONARY: No shortness of breath, no cough, no hemoptysis. 


GASTROINTESTINAL: No diarrhea, no nausea, no vomiting, no abdominal pain. 

Normoactive bowel sounds. 


NEUROLOGICAL: No headaches, no weakness, no numbness. 


                               Active Medications











Generic Name Dose Route Start Last Admin





  Trade Name Freq  PRN Reason Stop Dose Admin


 


Acetaminophen  325 mg  12/08/20 22:00  12/08/20 23:48





  Acetaminophen Tab 325 Mg Tab  PO   325 mg





  Q6HR PRN   Administration





  Mild Pain or Fever > 100.5  


 


Acetaminophen  325 mg  12/09/20 12:00  12/09/20 17:27





  Acetaminophen Tab 325 Mg Tab  PO   325 mg





  Q6HR STEPH   Administration


 


Allopurinol  300 mg  12/06/20 09:00  12/09/20 07:46





  Allopurinol 300 Mg Tab  PO   300 mg





  DAILY STEPH   Administration


 


Amlodipine Besylate  5 mg  12/06/20 09:00  12/09/20 07:45





  Amlodipine 5 Mg Tab  PO   5 mg





  BID STEPH   Administration


 


Ascorbic Acid  1,000 mg  12/08/20 22:00  12/09/20 07:45





  Ascorbic Acid 500 Mg Tab  PO   1,000 mg





  DAILY STEPH   Administration


 


Dexamethasone Sodium Phosphate  6 mg  12/08/20 22:30  12/09/20 07:47





  Dexamethasone Sod Phosphate 10 Mg/Ml 1 Ml Vial  IV   6 mg





  DAILY STEPH   Administration


 


Glimepiride  1 mg  12/09/20 07:30  12/09/20 07:45





  Glimepiride 1 Mg Tab  PO   1 mg





  [id number] STEPH   Administration


 


Sodium Chloride  1,000 mls @ 75 mls/hr  12/08/20 11:00  12/09/20 11:49





  Saline 0.9%  IV   75 mls/hr





  [id number] STEPH   Administration


 


Remdesivir 100 mg/ Sodium  250 mls @ 250 mls/hr  12/09/20 22:00 





  Chloride  IVPB  12/12/20 22:59 





  Q24H STEPH  


 


Ceftriaxone Sodium 1 gm/  50 mls @ 100 mls/hr  12/09/20 20:00 





  Sodium Chloride  IVPB  





  Q24HR STEPH  


 


Azithromycin 500 mg/ Sodium  250 mls @ 250 mls/hr  12/09/20 19:49 





  Chloride  IVPB  12/09/20 20:48 





  ONCE STA  


 


Azithromycin 500 mg/ Sodium  250 mls @ 250 mls/hr  12/10/20 09:00 





  Chloride  IVPB  





  DAILY STEPH  


 


Insulin Aspart  0 unit  12/06/20 07:30  12/09/20 17:27





  Insulin Aspart (Novolog) 100 Unit/Ml Vial  SQ   9 unit





  ACHS STEPH   Administration





  Protocol  


 


Insulin Detemir  30 unit  12/10/20 07:00 





  Insulin Detemir (Levemir) 100 Unit/Ml Syr  SQ  





  DAILY@0700 [facility]  


 


Insulin Detemir  10 unit  12/09/20 21:00 





  Insulin Detemir (Levemir) 100 Unit/Ml Syr  SQ  12/09/20 21:01 





  [facility]  


 


Metformin HCl  1,000 mg  12/09/20 07:30  12/09/20 17:28





  Metformin 500 Mg Tab  PO   1,000 mg





  BID-W/MEALS STEPH   Administration


 


Metoprolol Succinate  50 mg  12/06/20 09:00  12/09/20 07:46





  Metoprolol Succinate (Er) 50 Mg Tab.Er.24h  PO   50 mg





  DAILY STEPH   Administration


 


Naloxone HCl  0.2 mg  12/05/20 15:32 





Chest x-ray showing worsening infiltrates.  Naloxone 0.4 Mg/Ml 1 Ml Vial  IV  





  Q2M PRN  





  Opioid Reversal  


 


Naproxen  500 mg  12/05/20 23:05 





  Naproxen 250 Mg Tab  PO  





  BID PRN  





  Pain  


 


Pantoprazole Sodium  40 mg  12/06/20 09:00  12/09/20 07:45





  Pantoprazole 40 Mg Tablet  PO   40 mg





  DAILY STEPH   Administration


 


Rivaroxaban  20 mg  12/06/20 17:00  12/09/20 17:27





  Rivaroxaban 20 Mg Tab  PO   20 mg





  DAILY@1700 STEPH   Administration


 


Zinc Sulfate  220 mg  12/08/20 22:00  12/09/20 07:46





  Zinc Sulfate 220 Mg Cap  PO   220 mg





  DAILY STEPH   Administration














Objective





- Vital Signs


Vital signs: 


                                   Vital Signs











Temp  97 F L  12/09/20 17:30


 


Pulse  83   12/09/20 17:30


 


Resp  17   12/09/20 17:30


 


BP  128/85   12/09/20 17:30


 


Pulse Ox  90 L  12/09/20 17:30








                                 Intake & Output











 12/09/20 12/09/20 12/10/20





 06:59 18:59 06:59


 


Intake Total 100  


 


Output Total  650 


 


Balance 100 -650 


 


Intake:   


 


  Oral 100  


 


Output:   


 


  Urine  650 














- Exam





GENERAL: The patient is alert and oriented x3, not in any acute distress. Well 

developed, well nourished. 


HEENT: Pupils are round and equally reacting to light. EOMI. No scleral icterus.

No conjunctival pallor. Normocephalic, atraumatic. No pharyngeal erythema. No 

thyromegaly. 


CARDIOVASCULAR: S1 and S2 present. No murmurs, rubs, or gallops. 


PULMONARY: Chest is clear to auscultation, no wheezing or crackles. 


ABDOMEN: Soft, nontender, nondistended, normoactive bowel sounds. No palpable 

organomegaly. 


MUSCULOSKELETAL: No joint swelling or deformity. 


EXTREMITIES: No cyanosis, clubbing, or pedal edema. 


NEUROLOGICAL: Gross neurological examination did not reveal any focal deficits. 


SKIN: No rashes. no petechiae.





- Labs


CBC & Chem 7: 


                                 12/09/20 07:19





                                 12/09/20 07:19


Labs: 


                  Abnormal Lab Results - Last 24 Hours (Table)











  12/08/20 12/09/20 12/09/20 Range/Units





  20:39 07:02 07:19 


 


Lymphocytes #     (1.0-4.8)  k/uL


 


D-Dimer     (<0.60)  mg/L FEU


 


Carbon Dioxide     (21.6-31.8)  mmol/L


 


Anion Gap     (4.00-12.00)  mmol/L


 


Est GFR (CKD-EPI)NonAf     (60.0-200.0)   


 


Glucose     ()  mg/dL


 


POC Glucose (mg/dL)  230 H  325 H   (75-99)  mg/dL


 


AST     (14-35)  U/L


 


ALT     (10-49)  U/L


 


Lactate Dehydrogenase     (120-246)  U/L


 


C-Reactive Protein     (0.0-0.8)  mg/dL


 


Procalcitonin    0.27 H  (0.02-0.09)  ng/mL














  12/09/20 12/09/20 12/09/20 Range/Units





  07:19 07:19 07:19 


 


Lymphocytes #  0.7 L    (1.0-4.8)  k/uL


 


D-Dimer   0.88 H   (<0.60)  mg/L FEU


 


Carbon Dioxide    17.9 L  (21.6-31.8)  mmol/L


 


Anion Gap    17.10 H  (4.00-12.00)  mmol/L


 


Est GFR (CKD-EPI)NonAf    55.3 L  (60.0-200.0)   


 


Glucose    354 H  ()  mg/dL


 


POC Glucose (mg/dL)     (75-99)  mg/dL


 


AST    54 H  (14-35)  U/L


 


ALT    55 H  (10-49)  U/L


 


Lactate Dehydrogenase    364 H  (120-246)  U/L


 


C-Reactive Protein    12.5 H  (0.0-0.8)  mg/dL


 


Procalcitonin     (0.02-0.09)  ng/mL














  12/09/20 12/09/20 Range/Units





  11:41 17:03 


 


Lymphocytes #    (1.0-4.8)  k/uL


 


D-Dimer    (<0.60)  mg/L FEU


 


Carbon Dioxide    (21.6-31.8)  mmol/L


 


Anion Gap    (4.00-12.00)  mmol/L


 


Est GFR (CKD-EPI)NonAf    (60.0-200.0)   


 


Glucose    ()  mg/dL


 


POC Glucose (mg/dL)  341 H  336 H  (75-99)  mg/dL


 


AST    (14-35)  U/L


 


ALT    (10-49)  U/L


 


Lactate Dehydrogenase    (120-246)  U/L


 


C-Reactive Protein    (0.0-0.8)  mg/dL


 


Procalcitonin    (0.02-0.09)  ng/mL








                      Microbiology - Last 24 Hours (Table)











 12/05/20 13:35 Blood Culture - Preliminary





 Blood    No Growth after 96 hours














Assessment and Plan


Assessment: 





Fatigue generalized weakness secondary to Covid infection


New-onset diabetes


Hyponatremia due to dehydration


Dehydration


Gastroenteritis secondary to covid infection


Hypomagnesemia


Hypertension


History of MI


Plan: 





This is a pleasant 70 years old male who presents with complete infection 

without pneumonia, mild gastroenteritis and new diabetes.  Continue with zinc 

and ascorbic acid, continue with gentle hydration.  Continue with metformin 1000

mg twice daily and Amaryl 1 mg daily and also he is on Levemir 30 units daily.  

Continue with antibiotic ceftriaxone and Zithromax.


Patient has persistent fever and is not getting Tylenol when necessary, Soma 

going to put him on a small dose of scheduled Tylenol and monitor


Labs and medication were reviewed..  Continue same treatment.  Continue with 

symptomatic treatment.  Resume home medication.  Monitor lytes and vitals.  DVT 

and GI prophylaxis.  Further recommendationsas per clinical course of the 

patient


DVT prophylaxis: Subcutaneous heparin


GI Prophylaxis: Pepcid


Prognosis is guarded

## 2020-12-09 NOTE — CONS
CONSULTATION



DATE OF SERVICE:

12/08/2020.



REASON FOR CONSULTATION:

Fever and COVID-19 infection.



HISTORY OF PRESENT ILLNESS:

The patient is a 70-year-old  man presented to hospital at [facility] on 12/05/2020 for evaluation of generalized weakness and fatigue and sleepy,

poor appetite.  The patient's symptoms have been going on for about a week before

presentation hospital the patient. She also noted to have elevated blood pressure by

EMS.  On presentation to the hospital, the patient did have a fever of 102.8 degrees

Fahrenheit and the patient has been spiking a fever on a daily basis since 12/05/2020.

The patient has been complaining of shortness of breath.  The patient did have a cough

which is mild to moderate intensity, not bringing up any sputum.  Denies any nausea,

vomiting.  No abdominal pain.  No diarrhea.  With persistent fever.  Infectious Disease

was consulted for further management.  The patient did have a Covid test on admission

which was positive.  However, the patient has not been treated with steroids and

concern for elevated blood sugar.



REVIEW OF SYSTEMS:

Positive points have been mentioned in HPI.  Rest of the systems are negative.



PAST MEDICAL HISTORY:

Hypertension, MI and gout.



PAST SURGICAL HISTORY:

Appendectomy.



SOCIAL HISTORY:

Remote history of smoking.  No drinking or drug use.



FAMILY HISTORY:

No pertinent findings noticed.



ALLERGIES:

No known drug allergies.



MEDICATIONS:

The patient is currently on Tylenol, Norvasc, vitamin C, Elavil, and NovoLog,

Glucophage, Toprol-XL, naproxen, Xarelto, IV fluid and zinc.



PHYSICAL EXAMINATION:

Blood pressure 149/78 with a pulse of 89.  Temperature 101.2.  He is 90% on room air.

General description: The patient is an elderly male lying in bed in no distress.  No

tachypnea or accessory muscles of respiration use.

HEENT: Examination shows no pallor or scleral icterus.  Oral mucous membranes dry.

Lungs unlabored breathing, decreased breath sounds in the bases.  No wheeze or

crackles.  Heart S1, S2.  Regular rate and rhythm.

ABDOMEN:  Soft, no tenderness.  No guarding and no rigidity.  Extremities:  No edema of

the feet.  Skin examination: No rash or mass palpable.  Neurological: Patient is awake,

alert, oriented times three.  Mood and affect normal.



LABS:

Hemoglobin 14.4, white count _____. D.dimer 0.34, BUN of 23, creatinine 1.0.  CRP 7.5.

Urine is negative.  _____was negative.



DIAGNOSTIC IMPRESSION AND PLAN:

Patient with fever, source is likely acute Covid 19 infection/pneumonia in this

patient, now with evidence of hypoxemia with O2 sats of 90% on room air and complaining

of worsening shortness of breath and cough.  No other obvious focus of infection. Urine

was negative.  Abdomen soft on clinical examination.  No evidence of any cellulitis.



PLAN:

1. We will start the patient on Decadron 6 mg IV daily.  Continue Xarelto and Zinc.

2. The patient is started on Remdesivir, _____ 7 days per protocol.

3. _____respiratory support.

4. We will follow on clinical condition and investigations to further adjust

    medication if needed.

Thank you for this consultation. Will follow this patient along with you.





MMODL / IJN: [id number] / Job#: [id number]

## 2020-12-10 LAB
GLUCOSE BLD-MCNC: 230 MG/DL (ref 75–99)
GLUCOSE BLD-MCNC: 283 MG/DL (ref 75–99)
GLUCOSE BLD-MCNC: 285 MG/DL (ref 75–99)
GLUCOSE BLD-MCNC: 331 MG/DL (ref 75–99)

## 2020-12-10 RX ADMIN — INSULIN ASPART SCH UNIT: 100 INJECTION, SOLUTION INTRAVENOUS; SUBCUTANEOUS at 08:52

## 2020-12-10 RX ADMIN — CEFAZOLIN SCH: 330 INJECTION, POWDER, FOR SOLUTION INTRAMUSCULAR; INTRAVENOUS at 03:11

## 2020-12-10 RX ADMIN — METOPROLOL SUCCINATE SCH MG: 50 TABLET, EXTENDED RELEASE ORAL at 08:51

## 2020-12-10 RX ADMIN — ACETAMINOPHEN SCH: 325 TABLET, FILM COATED ORAL at 13:11

## 2020-12-10 RX ADMIN — ACETAMINOPHEN SCH: 325 TABLET, FILM COATED ORAL at 17:29

## 2020-12-10 RX ADMIN — INSULIN ASPART SCH UNIT: 100 INJECTION, SOLUTION INTRAVENOUS; SUBCUTANEOUS at 21:49

## 2020-12-10 RX ADMIN — AZITHROMYCIN MONOHYDRATE SCH MLS/HR: 500 INJECTION, POWDER, LYOPHILIZED, FOR SOLUTION INTRAVENOUS at 17:26

## 2020-12-10 RX ADMIN — INSULIN DETEMIR SCH UNIT: 100 INJECTION, SOLUTION SUBCUTANEOUS at 08:52

## 2020-12-10 RX ADMIN — ACETAMINOPHEN SCH MG: 325 TABLET, FILM COATED ORAL at 05:20

## 2020-12-10 RX ADMIN — REMDESIVIR SCH MLS/HR: 100 INJECTION, POWDER, LYOPHILIZED, FOR SOLUTION INTRAVENOUS at 21:50

## 2020-12-10 RX ADMIN — GLIMEPIRIDE SCH MG: 1 TABLET ORAL at 08:53

## 2020-12-10 RX ADMIN — INSULIN ASPART SCH UNIT: 100 INJECTION, SOLUTION INTRAVENOUS; SUBCUTANEOUS at 13:12

## 2020-12-10 RX ADMIN — OXYCODONE HYDROCHLORIDE AND ACETAMINOPHEN SCH MG: 500 TABLET ORAL at 08:51

## 2020-12-10 RX ADMIN — DEXTROSE SCH MG: 50 INJECTION, SOLUTION INTRAVENOUS at 08:50

## 2020-12-10 RX ADMIN — Medication SCH MG: at 08:51

## 2020-12-10 RX ADMIN — PANTOPRAZOLE SODIUM SCH MG: 40 TABLET, DELAYED RELEASE ORAL at 08:51

## 2020-12-10 RX ADMIN — INSULIN ASPART SCH UNIT: 100 INJECTION, SOLUTION INTRAVENOUS; SUBCUTANEOUS at 17:28

## 2020-12-10 RX ADMIN — RIVAROXABAN SCH MG: 20 TABLET, FILM COATED ORAL at 17:26

## 2020-12-10 RX ADMIN — CEFAZOLIN SCH MLS/HR: 330 INJECTION, POWDER, FOR SOLUTION INTRAMUSCULAR; INTRAVENOUS at 17:30

## 2020-12-10 NOTE — P.PN
Subjective


From Records


Mr. Chavez is a 70-year-old male,  who is a patient of Dr. Stovall with a past 

medical history of hypertension,MI coming in with a chief complaint of fatigue 

and generalized weakness that have been ongoing for past 1 week.  Patient states

that he has been having poor appetite loss of smell and loss of taste for the 

past 1 week.  He states that he has been sleeping for more than 20 hours a day. 

Patient denies having any cough or difficulty in breathing.  He denies having 

any chest pain or palpitations.  He denies having any dysuria or hematuria.  

Patient denies having any history of diabetes but his blood sugars are in 300s 

to 400s.  In the emergency room patient had a chest x-ray showing no acute 

cardiopulmonary process and an EKG showing normal sinus rhythm.  He had labs 

done showing white count of 3.9, hemoglobin 14.4, platelets 166.  Sodium 131, 

potassium 3.9, chloride.  BUN 15, creatinine 0.93 C-reactive protein 59, albumin

3.3 coronavirus PCR positive. 





On 12/6/2020 patient was seen and examined.  He is currently resting comfortably

in bed.  Appears to be no acute distress.  On reviewing the vitals patient guillermo

nues to fever as high as 102.3.  His blood pressure has been stable around 130s 

80s and he saturating at 95% on room air.  Patient's blood sugars have been 

running on the higher side.





On 12/07/2020- patient was seen and examined and the general medical floors.  He

is resting comfortably in bed.  Overnight active issues reported by nursing 

staff. Patient states that his difficulty in breathing is improving.  On 

reviewing the vitals patient's T-max is 98.4, heart rate 72, respiratory rate 

127-79 and saturating at 93% on room air.  On reviewing vitals patient's white 

count of 2.8, hemoglobin 14.4, platelets 189.  Sodium 136, potassium 4.6, 

chloride 102.  BUN 23 and creatinine of 1.0.  Blood sugars have been running 

high in 200s to 300s.  C-reactive protein 7.5.








12/08/2020


This is a pleasant 70 years old male who presents with cough with infection and 

found to have new onset diabetes with elevated hemoglobin A1c at 14%.


Patient currently with no respiratory symptoms, no chest pain or dyspnea 

coughing.  Yesterday he has a regular bowel movement but today he had 1 loose 

bowel movement but no significant abdominal pain or nausea vomiting.


Also he is running a fever of 101.  He saturating 90% at room air.


BMP and CBC were reviewed and they were unremarkable except for mild leukopenia 

2.8K.  D-dimer is negative at 0.34.  Coronavirus is detected.  Chest x-ray is 

normal


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  Normal saline at 75 mL/h was 

added.  Also Levemir 10 units daily and metformin 1000 mg twice daily has been 

added because his sugar still not controlled


Patient is able to eat % of his meal





12/9/20


Patient had a rough night because of fever, his breathing is quiet and stable 

and he's only on 2 L oxygen.  Is still have diarrhea about twice per day which 

is similar to the day before.  No abdominal pain or vomiting.  No significant 

coughing.  unstable


Chest x-ray: Worsening infiltrates, Pronecalcitonin is elevated at 0.27, 

patient is started on Rocephin and Zithromax.


He is new onset diabetes and also is on dexamethasone and his sugar more than 

300, so Levemir was decreased from 10-30 units daily.  Also he is on metformin 

1000 and Amaryl 1 mg.


Continue on Xarelto, normocephalic 75, remedisivr, dexamethasone 6 mg daily.





12/10/2020


Patient hadn't





9 because he could not sleep.  No respiratory symptoms, ongoing generalized 

weakness.


Yesterday he has watery diarrhea but this morning he had small more formed bowel

movement.  No abdominal pain.


Persistent fever have subsided and his been afebrile for more than 24 hours.  We

will check Tylenol as needed


His sugar is better controlled after increasing his insulin to 30 units.


Antibiotics with ceftriaxone and Zithromax were admitted with high 

proteincalcitonin.  We will try to check sputum culture.


Check labs in the morning





CONSTITUTIONAL: No fever, no malaise, no fatigue. 


HEENT: No recent visual problems or hearing problems. Denied any sore throat. 


CARDIOVASCULAR: No  orthopnea, PND, no palpitations, no syncope. 


PULMONARY: No shortness of breath, no cough, no hemoptysis. 


GASTROINTESTINAL: No diarrhea, no nausea, no vomiting, no abdominal pain. 

Normoactive bowel sounds. 


NEUROLOGICAL: No headaches, no weakness, no numbness. 


                               Active Medications











Generic Name Dose Route Start Last Admin





  Trade Name Freq  PRN Reason Stop Dose Admin


 


Acetaminophen  325 mg  12/08/20 22:00  12/08/20 23:48





  Acetaminophen Tab 325 Mg Tab  PO   325 mg





  Q6HR PRN   Administration





  Mild Pain or Fever > 100.5  


 


Acetaminophen  325 mg  12/09/20 12:00  12/10/20 17:29





  Acetaminophen Tab 325 Mg Tab  PO   Not Given





  Q6HR STEPH  


 


Allopurinol  300 mg  12/06/20 09:00  12/10/20 08:51





  Allopurinol 300 Mg Tab  PO   300 mg





  DAILY STEPH   Administration


 


Amlodipine Besylate  5 mg  12/06/20 09:00  12/10/20 21:49





  Amlodipine 5 Mg Tab  PO   5 mg





  BID STEPH   Administration


 


Ascorbic Acid  1,000 mg  12/08/20 22:00  12/10/20 08:51





  Ascorbic Acid 500 Mg Tab  PO   1,000 mg





  DAILY STEPH   Administration


 


Dexamethasone Sodium Phosphate  6 mg  12/08/20 22:30  12/10/20 08:50





  Dexamethasone Sod Phosphate 10 Mg/Ml 1 Ml Vial  IV   6 mg





  DAILY STEPH   Administration


 


Glimepiride  1 mg  12/09/20 07:30  12/10/20 08:53





  Glimepiride 1 Mg Tab  PO   1 mg





  [id number] STEPH   Administration


 


Sodium Chloride  1,000 mls @ 75 mls/hr  12/08/20 11:00  12/10/20 17:30





  Saline 0.9%  IV   75 mls/hr





  [id number] STEPH   Administration


 


Remdesivir 100 mg/ Sodium  250 mls @ 250 mls/hr  12/09/20 22:00  12/10/20 21:50





  Chloride  IVPB  12/12/20 22:59  250 mls/hr





  Q24H STEPH   Administration


 


Ceftriaxone Sodium 1 gm/  50 mls @ 100 mls/hr  12/09/20 20:15  12/10/20 08:53





  Sodium Chloride  IVPB   100 mls/hr





  Q24HR STEPH   Administration


 


Azithromycin 500 mg/ Sodium  250 mls @ 250 mls/hr  12/10/20 18:00  12/10/20 

17:26





  Chloride  IVPB   250 mls/hr





  DAILY@1800 STEPH   Administration


 


Insulin Aspart  0 unit  12/06/20 07:30  12/10/20 21:49





  Insulin Aspart (Novolog) 100 Unit/Ml Vial  SQ   5 unit





  ACHS STEPH   Administration





  Protocol  


 


Insulin Detemir  30 unit  12/10/20 07:00  12/10/20 08:52





  Insulin Detemir (Levemir) 100 Unit/Ml Syr  SQ   30 unit





  DAILY@0700 STEPH   Administration


 


Metformin HCl  1,000 mg  12/09/20 07:30  12/10/20 17:26





  Metformin 500 Mg Tab  PO   1,000 mg





  BID-W/MEALS STEPH   Administration


 


Metoprolol Succinate  50 mg  12/06/20 09:00  12/10/20 08:51





  Metoprolol Succinate (Er) 50 Mg Tab.Er.24h  PO   50 mg





  DAILY STEPH   Administration


 


Naloxone HCl  0.2 mg  12/05/20 15:32 





  Naloxone 0.4 Mg/Ml 1 Ml Vial  IV  





  Q2M PRN  





  Opioid Reversal  


 


Naproxen  500 mg  12/05/20 23:05 





  Naproxen 250 Mg Tab  PO  





  BID PRN  





  Pain  


 


Pantoprazole Sodium  40 mg  12/06/20 09:00  12/10/20 08:51





  Pantoprazole 40 Mg Tablet  PO   40 mg





  DAILY STEPH   Administration


 


Rivaroxaban  20 mg  12/06/20 17:00  12/10/20 17:26





  Rivaroxaban 20 Mg Tab  PO   20 mg





  DAILY@1700 STEPH   Administration


 


Zinc Sulfate  220 mg  12/08/20 22:00  12/10/20 08:51





  Zinc Sulfate 220 Mg Cap  PO   220 mg





  DAILY STEPH   Administration














Objective





- Vital Signs


Vital signs: 


                                   Vital Signs











Temp  97.2 F L  12/10/20 16:50


 


Pulse  66   12/10/20 16:50


 


Resp  18   12/10/20 19:47


 


BP  138/82   12/10/20 16:50


 


Pulse Ox  90 L  12/10/20 16:50








                                 Intake & Output











 12/10/20 12/10/20 12/11/20





 06:59 18:59 06:59


 


Intake Total   100


 


Balance   100


 


Intake:   


 


  Oral   100


 


Other:   


 


  Voiding Method Toilet Toilet Toilet


 


  # Voids 1 2 1


 


  # Bowel Movements 1  1














- Exam





GENERAL: The patient is alert and oriented x3, not in any acute distress. Well 

developed, well nourished. 


HEENT: Pupils are round and equally reacting to light. EOMI. No scleral icterus.

No conjunctival pallor. Normocephalic, atraumatic. No pharyngeal erythema. No 

thyromegaly. 


CARDIOVASCULAR: S1 and S2 present. No murmurs, rubs, or gallops. 


PULMONARY: Chest is clear to auscultation, no wheezing or crackles. 


ABDOMEN: Soft, nontender, nondistended, normoactive bowel sounds. No palpable 

organomegaly. 


MUSCULOSKELETAL: No joint swelling or deformity. 


EXTREMITIES: No cyanosis, clubbing, or pedal edema. 


NEUROLOGICAL: Gross neurological examination did not reveal any focal deficits. 


SKIN: No rashes. no petechiae.





- Labs


CBC & Chem 7: 


                                 12/09/20 07:19





                                 12/09/20 07:19


Labs: 


                  Abnormal Lab Results - Last 24 Hours (Table)











  12/09/20 12/10/20 12/10/20 Range/Units





  23:29 07:11 11:55 


 


POC Glucose (mg/dL)  337 H  283 H  285 H  (75-99)  mg/dL














  12/10/20 12/10/20 Range/Units





  16:54 20:34 


 


POC Glucose (mg/dL)  331 H  230 H  (75-99)  mg/dL








                      Microbiology - Last 24 Hours (Table)











 12/05/20 13:35 Blood Culture - Preliminary





 Blood    No Growth after 120 hours














Assessment and Plan


Assessment: 





Fatigue generalized weakness secondary to Covid infection


New-onset diabetes


Hyponatremia due to dehydration


Dehydration


Gastroenteritis secondary to covid infection


Hypomagnesemia


Hypertension


History of MI


Plan: 





This is a pleasant 70 years old male who presents with complete infection 

without pneumonia, mild gastroenteritis and new diabetes.  Continue with zinc 

and ascorbic acid, continue with gentle hydration.  Continue with metformin 1000

mg twice daily and Amaryl 1 mg daily and also he is on Levemir 30 units daily.  

Continue with antibiotic ceftriaxone and Zithromax.


Patient has persistent fever and is not getting Tylenol when necessary, Soma 

going to put him on a small dose of scheduled Tylenol and monitor


Labs and medication were reviewed..  Continue same treatment.  Continue with 

symptomatic treatment.  Resume home medication.  Monitor lytes and vitals.  DVT 

and GI prophylaxis.  Further recommendationsas per clinical course of the 

patient


DVT prophylaxis: Subcutaneous heparin


GI Prophylaxis: Pepcid


Prognosis is guarded

## 2020-12-10 NOTE — PN
PROGRESS NOTE



DATE OF SERVICE:

12/10/2020



REASON FOR FOLLOWUP:

COVID-19 pneumonia.



INTERVAL HISTORY:

The patient overall fever pattern has improved.  No fever last 48 hours.  The patient

is feeling slightly better. He is breathing more comfortably.  Denies any chest pain.

Did have a cough, not bringing any sputum.  No nausea, no vomiting.  No abdominal pain

or diarrhea.



PHYSICAL EXAMINATION:

Blood pressure 132/82 with a pulse of 66, temperature 97.2.  He is 90% on 2 L nasal

cannula.

General description is an elderly male up in the chair in no distress.  Respiratory

system: Unlabored breathing with decreased intensity in breath sounds in the base, with

no wheeze.  Heart S1, S2.  Regular rate and rhythm. Abdomen soft.  No tenderness.



LABS:

No new labs have been obtained today.



DIAGNOSTIC IMPRESSION AND PLAN:

Patient with acute COVID-19 infection pneumonia.  This patient has seemed to show

overall clinical improvement.  Currently being treated with Remdesivir, Xarelto, zinc

sulfate and Decadron with overall improvement in his symptoms.  The patient did have an

elevated procalcitonin and is covered with Rocephin and Zithromax as well.  Continue

supportive care.





MMODL / IJN: [id number] / Job#: [id number]

## 2020-12-10 NOTE — PN
PROGRESS NOTE



DATE OF SERVICE:

12/09/2020



REASON FOR FOLLOWUP:

COVID-19 pneumonia.



INTERVAL HISTORY:

The patient overall fever pattern has improved.  _____ has been 102 last night.  No

fever since then.  The patient is feeling slightly better.  He is breathing more

comfortably.  Still requiring supplemental oxygen.  No chest pain.  Minimal cough.  No

abdominal pain.  No diarrhea.



PHYSICAL EXAMINATION:

Blood pressure 122/85 with a pulse of 83.  Temperature is 97.1.  He is 90% on 2 L nasal

cannula.  General description is an elderly male lying in bed in no distress.

Respiratory system: Unlabored breathing with decreased breath sounds in the base, with

no wheeze.  Heart S1, S2.  Regular rate and rhythm. Abdomen soft, no tenderness.



LABS:

Hemoglobin 15.5, white count 8.2, D. dimer 0.88, creatinine 1.3. Procalcitonin was also

elevated.



DIAGNOSTIC IMPRESSION/PLAN:

Patient with fever, source likely pneumonia likely COVID-19 in this patient clinically

responded to Remdesivir and Dexamethasone to continue with elevated procalcitonin.

Antibiotic has been added to continue. Try to obtain a sputum to narrow down his

antibiotics.  Continue supportive care.





MMODL / IJN: [id number] / Job#: [id number]

## 2020-12-11 LAB
ANION GAP SERPL CALC-SCNC: 9.9 MMOL/L (ref 4–12)
BASOPHILS # BLD AUTO: 0.1 K/UL (ref 0–0.2)
BASOPHILS NFR BLD AUTO: 1 %
BUN SERPL-SCNC: 26 MG/DL (ref 9–27)
BUN/CREAT SERPL: 23.64 RATIO (ref 12–20)
CALCIUM SPEC-MCNC: 8.8 MG/DL (ref 8.7–10.3)
CHLORIDE SERPL-SCNC: 106 MMOL/L (ref 96–109)
CO2 SERPL-SCNC: 21.1 MMOL/L (ref 21.6–31.8)
EOSINOPHIL # BLD AUTO: 0 K/UL (ref 0–0.7)
EOSINOPHIL NFR BLD AUTO: 0 %
ERYTHROCYTE [DISTWIDTH] IN BLOOD BY AUTOMATED COUNT: 4.52 M/UL (ref 4.3–5.9)
ERYTHROCYTE [DISTWIDTH] IN BLOOD: 12.5 % (ref 11.5–15.5)
GLUCOSE BLD-MCNC: 168 MG/DL (ref 75–99)
GLUCOSE BLD-MCNC: 204 MG/DL (ref 75–99)
GLUCOSE BLD-MCNC: 217 MG/DL (ref 75–99)
GLUCOSE BLD-MCNC: 257 MG/DL (ref 75–99)
GLUCOSE SERPL-MCNC: 186 MG/DL (ref 70–110)
HCT VFR BLD AUTO: 41.4 % (ref 39–53)
HGB BLD-MCNC: 13.7 GM/DL (ref 13–17.5)
LDH SPEC-CCNC: 314 U/L (ref 120–246)
LYMPHOCYTES # SPEC AUTO: 0.7 K/UL (ref 1–4.8)
LYMPHOCYTES NFR SPEC AUTO: 6 %
MCH RBC QN AUTO: 30.3 PG (ref 25–35)
MCHC RBC AUTO-ENTMCNC: 33 G/DL (ref 31–37)
MCV RBC AUTO: 91.8 FL (ref 80–100)
MONOCYTES # BLD AUTO: 0.5 K/UL (ref 0–1)
MONOCYTES NFR BLD AUTO: 4 %
NEUTROPHILS # BLD AUTO: 10.1 K/UL (ref 1.3–7.7)
NEUTROPHILS NFR BLD AUTO: 87 %
PLATELET # BLD AUTO: 359 K/UL (ref 150–450)
POTASSIUM SERPL-SCNC: 3.9 MMOL/L (ref 3.5–5.5)
SODIUM SERPL-SCNC: 137 MMOL/L (ref 135–145)
WBC # BLD AUTO: 11.6 K/UL (ref 3.8–10.6)

## 2020-12-11 RX ADMIN — PANTOPRAZOLE SODIUM SCH MG: 40 TABLET, DELAYED RELEASE ORAL at 08:08

## 2020-12-11 RX ADMIN — AZITHROMYCIN MONOHYDRATE SCH MLS/HR: 500 INJECTION, POWDER, LYOPHILIZED, FOR SOLUTION INTRAVENOUS at 17:57

## 2020-12-11 RX ADMIN — INSULIN ASPART SCH UNIT: 100 INJECTION, SOLUTION INTRAVENOUS; SUBCUTANEOUS at 12:45

## 2020-12-11 RX ADMIN — RIVAROXABAN SCH MG: 20 TABLET, FILM COATED ORAL at 17:35

## 2020-12-11 RX ADMIN — GLIMEPIRIDE SCH MG: 1 TABLET ORAL at 08:17

## 2020-12-11 RX ADMIN — INSULIN ASPART SCH UNIT: 100 INJECTION, SOLUTION INTRAVENOUS; SUBCUTANEOUS at 21:10

## 2020-12-11 RX ADMIN — DEXTROSE SCH MG: 50 INJECTION, SOLUTION INTRAVENOUS at 08:08

## 2020-12-11 RX ADMIN — METOPROLOL SUCCINATE SCH MG: 50 TABLET, EXTENDED RELEASE ORAL at 08:08

## 2020-12-11 RX ADMIN — CEFAZOLIN SCH: 330 INJECTION, POWDER, FOR SOLUTION INTRAMUSCULAR; INTRAVENOUS at 20:23

## 2020-12-11 RX ADMIN — INSULIN DETEMIR SCH UNIT: 100 INJECTION, SOLUTION SUBCUTANEOUS at 08:09

## 2020-12-11 RX ADMIN — INSULIN ASPART SCH UNIT: 100 INJECTION, SOLUTION INTRAVENOUS; SUBCUTANEOUS at 08:09

## 2020-12-11 RX ADMIN — INSULIN ASPART SCH UNIT: 100 INJECTION, SOLUTION INTRAVENOUS; SUBCUTANEOUS at 17:34

## 2020-12-11 RX ADMIN — OXYCODONE HYDROCHLORIDE AND ACETAMINOPHEN SCH MG: 500 TABLET ORAL at 08:07

## 2020-12-11 RX ADMIN — CEFAZOLIN SCH: 330 INJECTION, POWDER, FOR SOLUTION INTRAMUSCULAR; INTRAVENOUS at 04:21

## 2020-12-11 RX ADMIN — REMDESIVIR SCH MLS/HR: 100 INJECTION, POWDER, LYOPHILIZED, FOR SOLUTION INTRAVENOUS at 21:11

## 2020-12-11 RX ADMIN — Medication SCH MG: at 08:07

## 2020-12-11 RX ADMIN — DILTIAZEM HYDROCHLORIDE SCH MLS/HR: 5 INJECTION INTRAVENOUS at 23:13

## 2020-12-11 NOTE — P.PN
Subjective


From Records


Mr. Chavez is a 70-year-old male,  who is a patient of Dr. Stovall with a past 

medical history of hypertension,MI coming in with a chief complaint of fatigue 

and generalized weakness that have been ongoing for past 1 week.  Patient states

that he has been having poor appetite loss of smell and loss of taste for the 

past 1 week.  He states that he has been sleeping for more than 20 hours a day. 

Patient denies having any cough or difficulty in breathing.  He denies having 

any chest pain or palpitations.  He denies having any dysuria or hematuria.  

Patient denies having any history of diabetes but his blood sugars are in 300s 

to 400s.  In the emergency room patient had a chest x-ray showing no acute 

cardiopulmonary process and an EKG showing normal sinus rhythm.  He had labs 

done showing white count of 3.9, hemoglobin 14.4, platelets 166.  Sodium 131, 

potassium 3.9, chloride.  BUN 15, creatinine 0.93 C-reactive protein 59, albumin

3.3 coronavirus PCR positive. 





On 12/6/2020 patient was seen and examined.  He is currently resting comfortably

in bed.  Appears to be no acute distress.  On reviewing the vitals patient guillermo

nues to fever as high as 102.3.  His blood pressure has been stable around 130s 

80s and he saturating at 95% on room air.  Patient's blood sugars have been 

running on the higher side.





On 12/07/2020- patient was seen and examined and the general medical floors.  He

is resting comfortably in bed.  Overnight active issues reported by nursing 

staff. Patient states that his difficulty in breathing is improving.  On 

reviewing the vitals patient's T-max is 98.4, heart rate 72, respiratory rate 

127-79 and saturating at 93% on room air.  On reviewing vitals patient's white 

count of 2.8, hemoglobin 14.4, platelets 189.  Sodium 136, potassium 4.6, 

chloride 102.  BUN 23 and creatinine of 1.0.  Blood sugars have been running 

high in 200s to 300s.  C-reactive protein 7.5.








12/08/2020


This is a pleasant 70 years old male who presents with cough with infection and 

found to have new onset diabetes with elevated hemoglobin A1c at 14%.


Patient currently with no respiratory symptoms, no chest pain or dyspnea 

coughing.  Yesterday he has a regular bowel movement but today he had 1 loose 

bowel movement but no significant abdominal pain or nausea vomiting.


Also he is running a fever of 101.  He saturating 90% at room air.


BMP and CBC were reviewed and they were unremarkable except for mild leukopenia 

2.8K.  D-dimer is negative at 0.34.  Coronavirus is detected.  Chest x-ray is 

normal


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  Normal saline at 75 mL/h was 

added.  Also Levemir 10 units daily and metformin 1000 mg twice daily has been 

added because his sugar still not controlled


Patient is able to eat % of his meal





12/9/20


Patient had a rough night because of fever, his breathing is quiet and stable 

and he's only on 2 L oxygen.  Is still have diarrhea about twice per day which 

is similar to the day before.  No abdominal pain or vomiting.  No significant 

coughing.  unstable


Chest x-ray: Worsening infiltrates, Pronecalcitonin is elevated at 0.27, 

patient is started on Rocephin and Zithromax.


He is new onset diabetes and also is on dexamethasone and his sugar more than 

300, so Levemir was decreased from 10-30 units daily.  Also he is on metformin 

1000 and Amaryl 1 mg.


Continue on Xarelto, normocephalic 75, remedisivr, dexamethasone 6 mg daily.





12/10/2020


Patient hadn't good night  because he could not sleep.  No respiratory symptoms,

ongoing generalized weakness.


Yesterday he has watery diarrhea but this morning he had small more formed bowel

movement.  No abdominal pain.


Persistent fever have subsided and his been afebrile for more than 24 hours.  We

will check Tylenol as needed


His sugar is better controlled after increasing his insulin to 30 units.


Antibiotics with ceftriaxone and Zithromax were admitted with high 

proteincalcitonin.  We will try to check sputum culture.


Check labs in the morning





12/11/2020


Patient feels much better today, his fever subsided and his on Tylenol as needed

currently.  No respiratory issue or symptom.  He had a little formed bowel 

movement today.  Currently patient remains on the same cocktail for comfort 

infection besides remedisivr


Hemodynamically stable.  Vitals are stable showing mild leukocytosis of 11.6 K








CONSTITUTIONAL: No fever, no malaise, no fatigue. 


HEENT: No recent visual problems or hearing problems. Denied any sore throat. 


CARDIOVASCULAR: No  orthopnea, PND, no palpitations, no syncope. 


PULMONARY: No shortness of breath, no cough, no hemoptysis. 


GASTROINTESTINAL: No diarrhea, no nausea, no vomiting, no abdominal pain. 

Normoactive bowel sounds. 


NEUROLOGICAL: No headaches, no weakness, no numbness. 


                               Active Medications











Generic Name Dose Route Start Last Admin





  Trade Name Freq  PRN Reason Stop Dose Admin


 


Acetaminophen  325 mg  12/08/20 22:00  12/08/20 23:48





  Acetaminophen Tab 325 Mg Tab  PO   325 mg





  Q6HR PRN   Administration





  Mild Pain or Fever > 100.5  


 


Allopurinol  300 mg  12/06/20 09:00  12/11/20 08:08





  Allopurinol 300 Mg Tab  PO   300 mg





  DAILY [facility]   Administration


 


Amlodipine Besylate  5 mg  12/06/20 09:00  12/11/20 08:07





  Amlodipine 5 Mg Tab  PO   5 mg





  BID STE[facility]   Administration


 


Ascorbic Acid  1,000 mg  12/08/20 22:00  12/11/20 08:07





  Ascorbic Acid 500 Mg Tab  PO   1,000 mg





  DAILY [facility]   Administration


 


Dexamethasone Sodium Phosphate  6 mg  12/08/20 22:30  12/11/20 08:08





  Dexamethasone Sod Phosphate 10 Mg/Ml 1 Ml Vial  IV   6 mg





  DAILY STE[facility]   Administration


 


Glimepiride  1 mg  12/09/20 07:30  12/11/20 08:17





  Glimepiride 1 Mg Tab  PO   1 mg





  AC-BRKFST STEPH   Administration


 


Sodium Chloride  1,000 mls @ 75 mls/hr  12/08/20 11:00  12/11/20 04:21





  Saline 0.9%  IV   Not Given





  .[id number] STE[facility]  


 


Remdesivir 100 mg/ Sodium  250 mls @ 250 mls/hr  12/09/20 22:00  12/10/20 21:50





  Chloride  IVPB  12/12/20 22:59  250 mls/hr





  Q24H [facility]   Administration


 


Ceftriaxone Sodium 1 gm/  50 mls @ 100 mls/hr  12/09/20 20:15  12/11/20 08:18





  Sodium Chloride  IVPB   100 mls/hr





  Q24HR [facility]   Administration


 


Azithromycin 500 mg/ Sodium  250 mls @ 250 mls/hr  12/10/20 18:00  12/10/20 

17:26





  Chloride  IVPB   250 mls/hr





  DAILY@1800 [facility]   Administration


 


Insulin Aspart  0 unit  12/06/20 07:30  12/11/20 12:45





  Insulin Aspart (Novolog) 100 Unit/Ml Vial  SQ   4 unit





  ACHS [facility]   Administration





  Protocol  


 


Insulin Detemir  30 unit  12/10/20 07:00  12/11/20 08:09





  Insulin Detemir (Levemir) 100 Unit/Ml Syr  SQ   30 unit





  DAILY@0700 [facility]   Administration


 


Metformin HCl  1,000 mg  12/09/20 07:30  12/11/20 08:08





  Metformin 500 Mg Tab  PO   1,000 mg





  BID-W/MEALS STE[facility]   Administration


 


Metoprolol Succinate  50 mg  12/06/20 09:00  12/11/20 08:08





  Metoprolol Succinate (Er) 50 Mg Tab.Er.24h  PO   50 mg





  DAILY STE[facility]   Administration


 


Naloxone HCl  0.2 mg  12/05/20 15:32 





  Naloxone 0.4 Mg/Ml 1 Ml Vial  IV  





  Q2M PRN  





  Opioid Reversal  


 


Naproxen  500 mg  12/05/20 23:05 





  Naproxen 250 Mg Tab  PO  





  BID PRN  





  Pain  


 


Pantoprazole Sodium  40 mg  12/06/20 09:00  12/11/20 08:08





  Pantoprazole 40 Mg Tablet  PO   40 mg





  DAILY STE[facility]   Administration


 


Rivaroxaban  20 mg  12/06/20 17:00  12/10/20 17:26





  Rivaroxaban 20 Mg Tab  PO   20 mg





  DAILY@1700 [facility]   Administration


 


Zinc Sulfate  220 mg  12/08/20 22:00  12/11/20 08:07





  Zinc Sulfate 220 Mg Cap  PO   220 mg





  DAILY STE[facility]   Administration














Objective





- Vital Signs


Vital signs: 


                                   Vital Signs











Temp  97.6 F   12/11/20 14:00


 


Pulse  70   12/11/20 14:00


 


Resp  18   12/11/20 14:00


 


BP  127/73   12/11/20 14:00


 


Pulse Ox  92 L  12/11/20 14:00








                                 Intake & Output











 12/10/20 12/11/20 12/11/20





 18:59 06:59 18:59


 


Intake Total  950 


 


Balance  950 


 


Intake:   


 


  Intake, IV Titration  250 





  Amount   


 


    Remdesivir 100 mg In  250 





    Sodium Chloride 0.9% 250   





    ml @ 250 mls/hr IVPB Q24H   





    [facility] Rx#:[id number]   


 


  Oral  700 


 


Other:   


 


  Voiding Method Toilet Toilet Toilet


 


  # Voids 2 1 1


 


  # Bowel Movements  1 














- Exam





GENERAL: The patient is alert and oriented x3, not in any acute distress. Well 

developed, well nourished. 


HEENT: Pupils are round and equally reacting to light. EOMI. No scleral icterus.

No conjunctival pallor. Normocephalic, atraumatic. No pharyngeal erythema. No 

thyromegaly. 


CARDIOVASCULAR: S1 and S2 present. No murmurs, rubs, or gallops. 


PULMONARY: Chest is clear to auscultation, no wheezing or crackles. 


ABDOMEN: Soft, nontender, nondistended, normoactive bowel sounds. No palpable 

organomegaly. 


MUSCULOSKELETAL: No joint swelling or deformity. 


EXTREMITIES: No cyanosis, clubbing, or pedal edema. 


NEUROLOGICAL: Gross neurological examination did not reveal any focal deficits. 


SKIN: No rashes. no petechiae.





- Labs


CBC & Chem 7: 


                                 12/11/20 06:55





                                 12/11/20 06:55


Labs: 


                  Abnormal Lab Results - Last 24 Hours (Table)











  12/10/20 12/10/20 12/11/20 Range/Units





  16:54 20:34 06:55 


 


WBC    11.6 H  (3.8-10.6)  k/uL


 


Neutrophils #    10.1 H  (1.3-7.7)  k/uL


 


Lymphocytes #    0.7 L  (1.0-4.8)  k/uL


 


D-Dimer     (<0.60)  mg/L FEU


 


Carbon Dioxide     (21.6-31.8)  mmol/L


 


BUN/Creatinine Ratio     (12.00-20.00)  Ratio


 


Glucose     ()  mg/dL


 


POC Glucose (mg/dL)  331 H  230 H   (75-99)  mg/dL


 


Lactate Dehydrogenase     (120-246)  U/L


 


C-Reactive Protein     (0.0-0.8)  mg/dL














  12/11/20 12/11/20 12/11/20 Range/Units





  06:55 06:55 06:57 


 


WBC     (3.8-10.6)  k/uL


 


Neutrophils #     (1.3-7.7)  k/uL


 


Lymphocytes #     (1.0-4.8)  k/uL


 


D-Dimer  0.63 H    (<0.60)  mg/L FEU


 


Carbon Dioxide   21.1 L   (21.6-31.8)  mmol/L


 


BUN/Creatinine Ratio   23.64 H   (12.00-20.00)  Ratio


 


Glucose   186 H   ()  mg/dL


 


POC Glucose (mg/dL)    168 H  (75-99)  mg/dL


 


Lactate Dehydrogenase   314 H   (120-246)  U/L


 


C-Reactive Protein   3.2 H   (0.0-0.8)  mg/dL














  12/11/20 Range/Units





  11:31 


 


WBC   (3.8-10.6)  k/uL


 


Neutrophils #   (1.3-7.7)  k/uL


 


Lymphocytes #   (1.0-4.8)  k/uL


 


D-Dimer   (<0.60)  mg/L FEU


 


Carbon Dioxide   (21.6-31.8)  mmol/L


 


BUN/Creatinine Ratio   (12.00-20.00)  Ratio


 


Glucose   ()  mg/dL


 


POC Glucose (mg/dL)  204 H  (75-99)  mg/dL


 


Lactate Dehydrogenase   (120-246)  U/L


 


C-Reactive Protein   (0.0-0.8)  mg/dL








                      Microbiology - Last 24 Hours (Table)











 12/05/20 13:35 Blood Culture - Preliminary





 Blood    No Growth after 120 hours














Assessment and Plan


Assessment: 





Fatigue generalized weakness secondary to Covid infection


New-onset diabetes


Hyponatremia due to dehydration


Dehydration


Gastroenteritis secondary to covid infection


Hypomagnesemia


Hypertension


History of MI


Plan: 





This is a pleasant 70 years old male who presents with complete infection 

without pneumonia, mild gastroenteritis and new diabetes.  Continue with zinc a

nd ascorbic acid, continue with gentle hydration.  Continue with metformin 1000 

mg twice daily and Amaryl 1 mg daily and also he is on Levemir 30 units daily.  

Continue with antibiotic ceftriaxone and Zithromax.


Patient has persistent fever and is not getting Tylenol when necessary, Soma 

going to put him on a small dose of scheduled Tylenol and monitor


Labs and medication were reviewed..  Continue same treatment.  Continue with 

symptomatic treatment.  Resume home medication.  Monitor lytes and vitals.  DVT 

and GI prophylaxis.  Further recommendationsas per clinical course of the 

patient


DVT prophylaxis: Subcutaneous heparin


GI Prophylaxis: Pepcid


Prognosis is guarded

## 2020-12-12 LAB
ANION GAP SERPL CALC-SCNC: 11 MMOL/L
ANION GAP SERPL CALC-SCNC: 11 MMOL/L
BASOPHILS # BLD AUTO: 0.1 K/UL (ref 0–0.2)
BASOPHILS NFR BLD AUTO: 1 %
BUN SERPL-SCNC: 21 MG/DL (ref 9–20)
BUN SERPL-SCNC: 22 MG/DL (ref 9–20)
CALCIUM SPEC-MCNC: 8.5 MG/DL (ref 8.4–10.2)
CALCIUM SPEC-MCNC: 8.9 MG/DL (ref 8.4–10.2)
CHLORIDE SERPL-SCNC: 107 MMOL/L (ref 98–107)
CHLORIDE SERPL-SCNC: 109 MMOL/L (ref 98–107)
CO2 BLDA-SCNC: 17 MMOL/L (ref 19–24)
CO2 SERPL-SCNC: 16 MMOL/L (ref 22–30)
CO2 SERPL-SCNC: 17 MMOL/L (ref 22–30)
EOSINOPHIL # BLD AUTO: 0 K/UL (ref 0–0.7)
EOSINOPHIL NFR BLD AUTO: 0 %
ERYTHROCYTE [DISTWIDTH] IN BLOOD BY AUTOMATED COUNT: 4.57 M/UL (ref 4.3–5.9)
ERYTHROCYTE [DISTWIDTH] IN BLOOD BY AUTOMATED COUNT: 4.62 M/UL (ref 4.3–5.9)
ERYTHROCYTE [DISTWIDTH] IN BLOOD: 12.5 % (ref 11.5–15.5)
ERYTHROCYTE [DISTWIDTH] IN BLOOD: 12.6 % (ref 11.5–15.5)
GLUCOSE BLD-MCNC: 154 MG/DL (ref 75–99)
GLUCOSE BLD-MCNC: 227 MG/DL (ref 75–99)
GLUCOSE BLD-MCNC: 228 MG/DL (ref 75–99)
GLUCOSE BLD-MCNC: 271 MG/DL (ref 75–99)
GLUCOSE SERPL-MCNC: 268 MG/DL (ref 74–99)
GLUCOSE SERPL-MCNC: 274 MG/DL (ref 74–99)
HCO3 BLDA-SCNC: 16 MMOL/L (ref 21–25)
HCT VFR BLD AUTO: 41.6 % (ref 39–53)
HCT VFR BLD AUTO: 42.4 % (ref 39–53)
HGB BLD-MCNC: 14.4 GM/DL (ref 13–17.5)
HGB BLD-MCNC: 14.5 GM/DL (ref 13–17.5)
LDH SPEC-CCNC: 858 U/L (ref 313–618)
LYMPHOCYTES # SPEC AUTO: 0.8 K/UL (ref 1–4.8)
LYMPHOCYTES NFR SPEC AUTO: 5 %
MAGNESIUM SPEC-SCNC: 1.8 MG/DL (ref 1.6–2.3)
MCH RBC QN AUTO: 31 PG (ref 25–35)
MCH RBC QN AUTO: 31.7 PG (ref 25–35)
MCHC RBC AUTO-ENTMCNC: 33.9 G/DL (ref 31–37)
MCHC RBC AUTO-ENTMCNC: 34.9 G/DL (ref 31–37)
MCV RBC AUTO: 91 FL (ref 80–100)
MCV RBC AUTO: 91.6 FL (ref 80–100)
MONOCYTES # BLD AUTO: 0.5 K/UL (ref 0–1)
MONOCYTES NFR BLD AUTO: 3 %
NEUTROPHILS # BLD AUTO: 13.4 K/UL (ref 1.3–7.7)
NEUTROPHILS NFR BLD AUTO: 90 %
PCO2 BLDA: 22 MMHG (ref 35–45)
PH BLDA: 7.48 [PH] (ref 7.35–7.45)
PLATELET # BLD AUTO: 442 K/UL (ref 150–450)
PLATELET # BLD AUTO: 502 K/UL (ref 150–450)
PO2 BLDA: 58 MMHG (ref 83–108)
POTASSIUM SERPL-SCNC: 4.4 MMOL/L (ref 3.5–5.1)
POTASSIUM SERPL-SCNC: 4.5 MMOL/L (ref 3.5–5.1)
SODIUM SERPL-SCNC: 135 MMOL/L (ref 137–145)
SODIUM SERPL-SCNC: 136 MMOL/L (ref 137–145)
WBC # BLD AUTO: 14.4 K/UL (ref 3.8–10.6)
WBC # BLD AUTO: 14.9 K/UL (ref 3.8–10.6)

## 2020-12-12 PROCEDURE — XW033E5 INTRODUCTION OF REMDESIVIR ANTI-INFECTIVE INTO PERIPHERAL VEIN, PERCUTANEOUS APPROACH, NEW TECHNOLOGY GROUP 5: ICD-10-PCS

## 2020-12-12 PROCEDURE — 5A09557 ASSISTANCE WITH RESPIRATORY VENTILATION, GREATER THAN 96 CONSECUTIVE HOURS, CONTINUOUS POSITIVE AIRWAY PRESSURE: ICD-10-PCS

## 2020-12-12 RX ADMIN — INSULIN ASPART SCH UNIT: 100 INJECTION, SOLUTION INTRAVENOUS; SUBCUTANEOUS at 17:28

## 2020-12-12 RX ADMIN — METOPROLOL TARTRATE SCH MG: 25 TABLET, FILM COATED ORAL at 14:50

## 2020-12-12 RX ADMIN — PANTOPRAZOLE SODIUM SCH MG: 40 TABLET, DELAYED RELEASE ORAL at 08:03

## 2020-12-12 RX ADMIN — DILTIAZEM HYDROCHLORIDE SCH MLS/HR: 5 INJECTION INTRAVENOUS at 16:16

## 2020-12-12 RX ADMIN — INSULIN DETEMIR SCH UNIT: 100 INJECTION, SOLUTION SUBCUTANEOUS at 06:54

## 2020-12-12 RX ADMIN — REMDESIVIR SCH MLS/HR: 100 INJECTION, POWDER, LYOPHILIZED, FOR SOLUTION INTRAVENOUS at 20:28

## 2020-12-12 RX ADMIN — DILTIAZEM HYDROCHLORIDE SCH MLS/HR: 5 INJECTION INTRAVENOUS at 13:18

## 2020-12-12 RX ADMIN — INSULIN ASPART SCH UNIT: 100 INJECTION, SOLUTION INTRAVENOUS; SUBCUTANEOUS at 06:54

## 2020-12-12 RX ADMIN — DEXTROSE SCH MG: 50 INJECTION, SOLUTION INTRAVENOUS at 20:27

## 2020-12-12 RX ADMIN — INSULIN ASPART SCH UNIT: 100 INJECTION, SOLUTION INTRAVENOUS; SUBCUTANEOUS at 12:05

## 2020-12-12 RX ADMIN — METOPROLOL TARTRATE SCH MG: 25 TABLET, FILM COATED ORAL at 20:28

## 2020-12-12 RX ADMIN — GLIMEPIRIDE SCH MG: 1 TABLET ORAL at 06:53

## 2020-12-12 RX ADMIN — DILTIAZEM HYDROCHLORIDE SCH MLS/HR: 5 INJECTION INTRAVENOUS at 05:16

## 2020-12-12 RX ADMIN — RIVAROXABAN SCH MG: 20 TABLET, FILM COATED ORAL at 17:28

## 2020-12-12 RX ADMIN — INSULIN ASPART SCH UNIT: 100 INJECTION, SOLUTION INTRAVENOUS; SUBCUTANEOUS at 20:28

## 2020-12-12 RX ADMIN — DEXTROSE SCH MG: 50 INJECTION, SOLUTION INTRAVENOUS at 08:04

## 2020-12-12 RX ADMIN — Medication SCH MG: at 08:03

## 2020-12-12 RX ADMIN — AZITHROMYCIN MONOHYDRATE SCH MLS/HR: 500 INJECTION, POWDER, LYOPHILIZED, FOR SOLUTION INTRAVENOUS at 17:26

## 2020-12-12 RX ADMIN — DILTIAZEM HYDROCHLORIDE SCH MLS/HR: 5 INJECTION INTRAVENOUS at 22:17

## 2020-12-12 RX ADMIN — CEFAZOLIN SCH MLS/HR: 330 INJECTION, POWDER, FOR SOLUTION INTRAMUSCULAR; INTRAVENOUS at 08:03

## 2020-12-12 RX ADMIN — AMIODARONE HYDROCHLORIDE SCH MG: 200 TABLET ORAL at 20:28

## 2020-12-12 RX ADMIN — OXYCODONE HYDROCHLORIDE AND ACETAMINOPHEN SCH MG: 500 TABLET ORAL at 08:03

## 2020-12-12 NOTE — P.CRDCN
History of Present Illness


History of present illness: 





HISTORY OF PRESENTING ILLNESS


This is a pleasant 70-year-old  male past medical history significant 

for ischemic heart disease exact details unavailable at this occurred in [place]

according to the patient he had a catheterization 6 years ago and has natural 

collateral flow no PCI required, pulmonary embolism and hypertension.  He 

follows in the office with Dr. Howard. We have been asked to see in 

consultation for atrial fibrillation with rapid ventricular rates.  Is currently

being treated for acute visit 19.  Telemetry tracings indicate he went into 

atrial fibrillation yesterday evening with heart rates as high as 160.  He has 

been initiated on IV Cardizem, IV amiodarone and oral beta blockers have been 

increased.  He had a recent echocardiogram in July of this year revealing 

preserved LV systolic function with ejection fraction 50-55%.





DIAGNOSTICS


EKG reveals fibrillation with rapid ventricular rate.


Telemetry tracings indicate atrial fibrillation with rapid ventricular rate.


Chest xray reveals a patchy increasing lung markings, atypical pneumonia.


Laboratory reviewed, to BC 14.4, hemoglobin 14.5, platelets 442, sodium 136, 

potassium 4.4, creatinine 0.82,.


Current cardiac medications include amlodipine 5 mg twice a day, Xarelto 20 mg 

daily and Toprol 50 mg daily. 





REVIEW OF SYSTEMS


At the time of my exam:


CONSTITUTIONAL: Denies fever or chills.


CARDIOVASCULAR: Denies chest pain, shortness of breath, orthopnea, PND or 

palpitations.


RESPIRATORY: Denies cough. 


GASTROINTESTINAL: Denies abdominal pain, diarrhea, constipation, nausea or 

vomiting.


MUSCULOSKELETAL: Denies myalgias.


NEUROLOGIC: Denies numbness, tingling or weakness.


ENDOCRINE: Denies fatigue, weight change,  polydipsia or polyurina.


GENITOURINARY: Denies burning, hematuria or urgency with micturation.


HEMATOLOGIC: Denies history of anemia or bleeding. 





PHYSICAL EXAMINATION


Blood pressure 120/88 heart rate 117 afebrile and maintaining oxygen saturation 

on nasal cannula.


CONSTITUTIONAL: No apparent distress. 


HEENT: Head is normocephalic. Pupils are equal, round. Sclerae anicteric. Mucous

membranes of the mouth are moist.  No JVD. No carotid bruit.


CHEST EXAMINATION: Lungs are clear to auscultation. No chest wall tenderness is 

noted on palpation or with deep breathing. 


HEART EXAMINATION: Irregular rate and rhythm. S1, S2 heard. No murmurs, gallops 

or rub.


ABDOMEN: Soft, nontender. Positive bowel sounds.


EXTREMITIES: 2+ peripheral pulses, no lower extremity edema and no calf 

tenderness.


NEUROLOGIC EXAMINATION: Patient is awake, alert and oriented x3. 





ASSESSMENT


New onset paroxysmal atrial fibrillation with rapid ventricular rate


Acute Covid 19 infection


New-onset diabetes mellitus


History of pulmonary embolism maintained on Xarelto


Hypertension





PLAN


Continue xarelto for thromboembolic protection. 


Given additional dose of lopressor 75 mg now and change toprol to lopressor 75 

mg TID. 


Continue cardizem and amiodarone as previously ordered. 


Give cardizem 10 mg IVP now and increase infusion to 20 mg. 


Further recommendations to follow based on clinical course. 


Thank you kindly for this consultation.








Nurse Practitioner note has been reviewed, I agree with a documented findings 

and plan of care.  Patient was seen and examined.











Past Medical History


Past Medical History: Hypertension, Myocardial Infarction (MI), Pulmonary 

Embolus (PE)


Additional Past Medical History / Comment(s): Gout, right upper lobe lung nodule

being monitored in the outpatient setting, left upper lobe pulmonary embolism in

July 2020, on Xarelto


Last Myocardial Infarction Date:: 7\21\2014


History of Any Multi-Drug Resistant Organisms: None Reported


Past Surgical History: Appendectomy


Additional Past Surgical History / Comment(s): pt states appendectomy 30-40 

years ago.  pt states he doesn't know when his last MI was, because he was in 

[facility] at the time he also said it was about 6 years ago.


Past Psychological History: No Psychological Hx Reported


Smoking Status: Former smoker


Past Alcohol Use History: None Reported


Past Drug Use History: None Reported





Medications and Allergies


                                Home Medications











 Medication  Instructions  Recorded  Confirmed  Type


 


Allopurinol [Zyloprim] 300 mg PO DAILY 07/21/20 12/05/20 History


 


amLODIPine [Norvasc] 5 mg PO BID 07/21/20 12/05/20 History


 


Metoprolol Succinate [Toprol XL] 50 mg PO DAILY 12/05/20 12/05/20 History


 


Naproxen Sodium [Naprelan] 500 mg PO BID PRN 12/05/20 12/05/20 History


 


Omeprazole 20 mg PO DAILY 12/05/20 12/05/20 History


 


Rivaroxaban [Xarelto] 20 mg PO DAILY 12/05/20 12/05/20 History








                                    Allergies











Allergy/AdvReac Type Severity Reaction Status Date / Time


 


No Known Allergies Allergy   Verified 12/05/20 15:10














Physical Exam


Vitals: 


                                   Vital Signs











  Temp Pulse Resp BP Pulse Ox


 


 12/12/20 08:00   144 H   


 


 12/12/20 07:58  97.6 F  144 H  18  116/86  94 L


 


 12/12/20 04:10      92 L


 


 12/12/20 04:05      93 L


 


 12/12/20 04:00  97.4 F L  141 H  22  118/70  86 L


 


 12/12/20 00:00  98.7 F  168 H  22  120/83  91 L


 


 12/11/20 21:55  97.8 F  117 H  20  120/88  90 L


 


 12/11/20 19:45    16  


 


 12/11/20 18:03  97.3 F L    


 


 12/11/20 17:56   89  16  120/81  90 L


 


 12/11/20 14:00  97.6 F  70  18  127/73  92 L








                                Intake and Output











 12/11/20 12/12/20 12/12/20





 22:59 06:59 14:59


 


Intake Total 700 321.167 


 


Output Total  325 


 


Balance 700 -3.833 


 


Intake:   


 


  Intake, IV Titration 100 81.167 





  Amount   


 


    Diltiazem 125 mg In  81.167 





    Sodium Chloride 0.9% 100   





    ml @ 10 MG/HR 10 mls/hr   





    IV .[id number] [facility] Rx#:   





    [id number]   


 


    cefTRIAXone 1 gm In 100  





    Sodium Chloride 0.9% 50   





    ml @ 100 mls/hr IVPB   





    Q24HR [facility] Rx#:[id number]   


 


  Oral 600 240 


 


Output:   


 


  Urine  325 


 


Other:   


 


  Voiding Method Toilet  


 


  # Voids 2 1 


 


  Weight  92.5 kg 














Results





                                 12/12/20 09:11





                                 12/12/20 09:11


                                 Cardiac Enzymes











  12/11/20 Range/Units





  06:55 


 


Lactate Dehydrogenase  314 H  (120-246)  U/L








                                       CBC











  12/12/20 Range/Units





  09:11 


 


WBC  14.4 H  (3.8-10.6)  k/uL


 


RBC  4.57  (4.30-5.90)  m/uL


 


Hgb  14.5  (13.0-17.5)  gm/dL


 


Hct  41.6  (39.0-53.0)  %


 


Plt Count  442  (150-450)  k/uL








                          Comprehensive Metabolic Panel











  12/11/20 12/12/20 Range/Units





  06:55 09:11 


 


Sodium  137  136 L  (135-145)  mmol/L


 


Potassium  3.9  4.4  (3.5-5.5)  mmol/L


 


Chloride  106  109 H  ()  mmol/L


 


Carbon Dioxide  21.1 L  16 L  (21.6-31.8)  mmol/L


 


BUN  26.0  21 H  (9.0-27.0)  mg/dL


 


Creatinine  1.1  0.82  (0.6-1.5)  mg/dL


 


Glucose  186 H  268 H  ()  mg/dL


 


Calcium  8.8  8.5  (8.7-10.3)  mg/dL








                               Current Medications











Generic Name Dose Route Start Last Admin





  Trade Name Freq  PRN Reason Stop Dose Admin


 


Acetaminophen  325 mg  12/08/20 22:00  12/08/20 23:48





  Acetaminophen Tab 325 Mg Tab  PO   325 mg





  Q6HR PRN   Administration





  Mild Pain or Fever > 100.5  


 


Allopurinol  300 mg  12/06/20 09:00  12/12/20 08:03





  Allopurinol 300 Mg Tab  PO   300 mg





  DAILY STEPH   Administration


 


Amlodipine Besylate  5 mg  12/06/20 09:00  12/12/20 08:03





  Amlodipine 5 Mg Tab  PO   5 mg





  BID STEPH   Administration


 


Ascorbic Acid  1,000 mg  12/08/20 22:00  12/12/20 08:03





  Ascorbic Acid 500 Mg Tab  PO   1,000 mg





  DAILY STEPH   Administration


 


Dexamethasone Sodium Phosphate  6 mg  12/08/20 22:30  12/12/20 08:04





  Dexamethasone Sod Phosphate 10 Mg/Ml 1 Ml Vial  IV   6 mg





  DAILY STEPH   Administration


 


Glimepiride  1 mg  12/09/20 07:30  12/12/20 06:53





  Glimepiride 1 Mg Tab  PO   1 mg





  AC-BRKFST STEPH   Administration


 


Sodium Chloride  1,000 mls @ 50 mls/hr  12/08/20 11:00  12/12/20 08:03





  Saline 0.9%  IV   50 mls/hr





  .Q20H STEPH   Administration


 


Remdesivir 100 mg/ Sodium  250 mls @ 250 mls/hr  12/09/20 22:00  12/11/20 21:11





  Chloride  IVPB  12/12/20 22:59  250 mls/hr





  Q24H STEPH   Administration


 


Ceftriaxone Sodium 1 gm/  50 mls @ 100 mls/hr  12/09/20 20:15  12/12/20 08:09





  Sodium Chloride  IVPB   100 mls/hr





  Q24HR STE[facility]   Administration


 


Azithromycin 500 mg/ Sodium  250 mls @ 250 mls/hr  12/10/20 18:00  12/11/20 

17:57





  Chloride  IVPB   250 mls/hr





  DAILY@1800 [facility]   Administration


 


Diltiazem HCl 125 mg/ Sodium  125 mls @ 10 mls/hr  12/11/20 23:00  12/12/20 

05:16





  Chloride  IV   15 mg/hr





  .[id number] STEPH   15 mls/hr





    Administration





  10 MG/HR  


 


Amiodarone HCl 360 mg/  200 mls @ 33.333 mls/hr  12/12/20 07:00  12/12/20 07:04





  Dextrose/Water  IV  12/12/20 12:59  1 mg/min





  .Q6H ONE   33.333 mls/hr





    Administration





  Protocol  





  1 MG/MIN  


 


Insulin Aspart  0 unit  12/06/20 07:30  12/12/20 06:54





  Insulin Aspart (Novolog) 100 Unit/Ml Vial  SQ   2 unit





  ACHS STE[facility]   Administration





  Protocol  


 


Insulin Detemir  30 unit  12/10/20 07:00  12/12/20 06:54





  Insulin Detemir (Levemir) 100 Unit/Ml Syr  SQ   30 unit





  DAILY@0700 [facility]   Administration


 


Metformin HCl  1,000 mg  12/09/20 07:30  12/12/20 06:54





  Metformin 500 Mg Tab  PO   1,000 mg





  BID-W/MEALS STEPH   Administration


 


Metoprolol Succinate  50 mg  12/12/20 09:00  12/12/20 08:03





  Metoprolol Succinate (Er) 50 Mg Tab.Er.24h  PO   50 mg





  BID STEPH   Administration


 


Naloxone HCl  0.2 mg  12/05/20 15:32 





  Naloxone 0.4 Mg/Ml 1 Ml Vial  IV  





  Q2M PRN  





  Opioid Reversal  


 


Naproxen  500 mg  12/05/20 23:05 





  Naproxen 250 Mg Tab  PO  





  BID PRN  





  Pain  


 


Pantoprazole Sodium  40 mg  12/06/20 09:00  12/12/20 08:03





  Pantoprazole 40 Mg Tablet  PO   40 mg





  DAILY STEPH   Administration


 


Rivaroxaban  20 mg  12/06/20 17:00  12/11/20 17:35





  Rivaroxaban 20 Mg Tab  PO   20 mg





  DAILY@1700 [facility]   Administration


 


Zinc Sulfate  220 mg  12/08/20 22:00  12/12/20 08:03





  Zinc Sulfate 220 Mg Cap  PO   220 mg





  DAILY STE[facility]   Administration








                                Intake and Output











 12/11/20 12/12/20 12/12/20





 22:59 06:59 14:59


 


Intake Total 700 321.167 


 


Output Total  325 


 


Balance 700 -3.833 


 


Intake:   


 


  Intake, IV Titration 100 81.167 





  Amount   


 


    Diltiazem 125 mg In  81.167 





    Sodium Chloride 0.9% 100   





    ml @ 10 MG/HR 10 mls/hr   





    IV .[id number] [facility] Rx#:   





    [id number]   


 


    cefTRIAXone 1 gm In 100  





    Sodium Chloride 0.9% 50   





    ml @ 100 mls/hr IVPB   





    Q24HR [facility] Rx#:[id number]   


 


  Oral 600 240 


 


Output:   


 


  Urine  325 


 


Other:   


 


  Voiding Method Toilet  


 


  # Voids 2 1 


 


  Weight  92.5 kg 








                                        





                                 12/12/20 09:11 





                                 12/12/20 09:11

## 2020-12-12 NOTE — XR
EXAMINATION TYPE: XR chest 1V portable

 

DATE OF EXAM: 12/12/2020

 

COMPARISON: 12/9/2020.

 

HISTORY: Follow-up shortness of breath.

 

TECHNIQUE: Single frontal view of the chest is obtained.

 

FINDINGS:  There are persistent bilateral moderate peripheral based opacities throughout the lungs. N
o pleural effusion, or pneumothorax seen.  The cardiac silhouette size is mildly enlarged.   The osse
ous structures are intact.

 

IMPRESSION:

 

Moderate peripheral based opacities.

## 2020-12-12 NOTE — PN
PROGRESS NOTE



DATE OF SERVICE:

12/11/2020



REASON FOR FOLLOWUP:

COVID-19 pneumonia.



INTERVAL HISTORY:

Patient is currently afebrile.  The patient is breathing more comfortably.  The patient

denies having any chest pain.  He did have a cough, not bringing up any sputum.  No

nausea, no vomiting.  No abdominal pain.  No diarrhea.



PHYSICAL EXAMINATION:

Blood pressure 120/88 with a pulse of 77, temperature 97.8. He is 90% on 3 L nasal

cannula. General description is an elderly male lying in bed in no distress.

Respiratory system: Unlabored breathing, decreased intensity of breath sounds.  No

wheeze.

HEART: S1, S2.  Regular rate and rhythm.

ABDOMEN:  Soft, no tenderness.



LABS:

He did have a slightly down white count 11.6, BUN of 26, creatinine 1.1.



DIAGNOSTIC IMPRESSION/PLAN:

Patient with acute COVID-19 infection in this patient with clinical response to the

dexamethasone, Xarelto, Remdesivir and zinc.  The patient is also covered with

antibiotics has the patient did have elevated Procalcitonin. Continue current treatment

protocol.  Monitor clinical course closely.





MMODL / IJN: [id number] / Job#: [id number]

## 2020-12-12 NOTE — P.PN
Subjective


From Records


Mr. Chavez is a 70-year-old male,  who is a patient of Dr. Stovall with a past 

medical history of hypertension,MI coming in with a chief complaint of fatigue 

and generalized weakness that have been ongoing for past 1 week.  Patient states

that he has been having poor appetite loss of smell and loss of taste for the 

past 1 week.  He states that he has been sleeping for more than 20 hours a day. 

Patient denies having any cough or difficulty in breathing.  He denies having 

any chest pain or palpitations.  He denies having any dysuria or hematuria.  

Patient denies having any history of diabetes but his blood sugars are in 300s 

to 400s.  In the emergency room patient had a chest x-ray showing no acute 

cardiopulmonary process and an EKG showing normal sinus rhythm.  He had labs 

done showing white count of 3.9, hemoglobin 14.4, platelets 166.  Sodium 131, 

potassium 3.9, chloride.  BUN 15, creatinine 0.93 C-reactive protein 59, albumin

3.3 coronavirus PCR positive. 





On 12/6/2020 patient was seen and examined.  He is currently resting comfortably

in bed.  Appears to be no acute distress.  On reviewing the vitals patient guillermo

nues to fever as high as 102.3.  His blood pressure has been stable around 130s 

80s and he saturating at 95% on room air.  Patient's blood sugars have been 

running on the higher side.





On 12/07/2020- patient was seen and examined and the general medical floors.  He

is resting comfortably in bed.  Overnight active issues reported by nursing 

staff. Patient states that his difficulty in breathing is improving.  On 

reviewing the vitals patient's T-max is 98.4, heart rate 72, respiratory rate 

127-79 and saturating at 93% on room air.  On reviewing vitals patient's white 

count of 2.8, hemoglobin 14.4, platelets 189.  Sodium 136, potassium 4.6, 

chloride 102.  BUN 23 and creatinine of 1.0.  Blood sugars have been running 

high in 200s to 300s.  C-reactive protein 7.5.








12/08/2020


This is a pleasant 70 years old male who presents with cough with infection and 

found to have new onset diabetes with elevated hemoglobin A1c at 14%.


Patient currently with no respiratory symptoms, no chest pain or dyspnea 

coughing.  Yesterday he has a regular bowel movement but today he had 1 loose 

bowel movement but no significant abdominal pain or nausea vomiting.


Also he is running a fever of 101.  He saturating 90% at room air.


BMP and CBC were reviewed and they were unremarkable except for mild leukopenia 

2.8K.  D-dimer is negative at 0.34.  Coronavirus is detected.  Chest x-ray is 

normal


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  Normal saline at 75 mL/h was 

added.  Also Levemir 10 units daily and metformin 1000 mg twice daily has been 

added because his sugar still not controlled


Patient is able to eat % of his meal





12/9/20


Patient had a rough night because of fever, his breathing is quiet and stable 

and he's only on 2 L oxygen.  Is still have diarrhea about twice per day which 

is similar to the day before.  No abdominal pain or vomiting.  No significant 

coughing.  unstable


Chest x-ray: Worsening infiltrates, Pronecalcitonin is elevated at 0.27, 

patient is started on Rocephin and Zithromax.


He is new onset diabetes and also is on dexamethasone and his sugar more than 

300, so Levemir was decreased from 10-30 units daily.  Also he is on metformin 

1000 and Amaryl 1 mg.


Continue on Xarelto, normocephalic 75, remedisivr, dexamethasone 6 mg daily.





12/10/2020


Patient hadn't good night  because he could not sleep.  No respiratory symptoms,

ongoing generalized weakness.


Yesterday he has watery diarrhea but this morning he had small more formed bowel

movement.  No abdominal pain.


Persistent fever have subsided and his been afebrile for more than 24 hours.  We

will check Tylenol as needed


His sugar is better controlled after increasing his insulin to 30 units.


Antibiotics with ceftriaxone and Zithromax were admitted with high 

proteincalcitonin.  We will try to check sputum culture.


Check labs in the morning





12/11/2020


Patient feels much better today, his fever subsided and his on Tylenol as needed

currently.  No respiratory issue or symptom.  He had a little formed bowel 

movement today.  Currently patient remains on the same cocktail for comfort 

infection besides remedisivr


Hemodynamically stable.  Vitals are stable showing mild leukocytosis of 11.6 K





12/12/2020


pt is moved to [facility] unit , pt developed a fib and RVR and he was started on 

Cardizem and amiodarone drip and is already on Xarelto. 


Also patient on Lopressor 75 mg 3 times a day Also he developed more hypoxic and

is currently on 50 L oxygen via nasal cannula saturating 90%.  


Repeat chest x-ray Moderate peripheral-based opacities throughout both lungs 

with no pleural effusion.  Blood gas on BOTH HIS WITH PH 7.48, LOW PCO2 OF 22 

AND LOW PO2 AT 58 PATIENT HAS BEEN EVALUATED BY PULMONARY SERVICE AND PATIENT 

MAY NEED TO GO TO THE ICU AS WELL.


LEFT SHOWING STABLE LEUKOCYTOSIS AT 14.9 K, ELEVATED D-DIMER 3.1, BMP IS 

UNREMARKABLE AND WORSENING OF THE EDGE AND C-REACTIVE PROTEIN


Patient is kept on remedisivr, dexamethasone 6 mg daily.


However patient diarrhea has improved 

















CONSTITUTIONAL: No fever, no malaise, no fatigue. 


HEENT: No recent visual problems or hearing problems. Denied any sore throat. 


CARDIOVASCULAR: No  orthopnea, PND, no palpitations, no syncope. 


PULMONARY: No shortness of breath, no cough, no hemoptysis. 


GASTROINTESTINAL: No diarrhea, no nausea, no vomiting, no abdominal pain. 

Normoactive bowel sounds. 


NEUROLOGICAL: No headaches, no weakness, no numbness. 


                               Active Medications











Generic Name Dose Route Start Last Admin





  Trade Name Freq  PRN Reason Stop Dose Admin


 


Acetaminophen  325 mg  12/08/20 22:00  12/08/20 23:48





  Acetaminophen Tab 325 Mg Tab  PO   325 mg





  Q6HR PRN   Administration





  Mild Pain or Fever > 100.5  


 


Allopurinol  300 mg  12/06/20 09:00  12/12/20 08:03





  Allopurinol 300 Mg Tab  PO   300 mg





  DAILY STEPH   Administration


 


Amiodarone HCl  400 mg  12/12/20 21:00 





  Amiodarone 200 Mg Tab  PO  





  BID STEPH  


 


Ascorbic Acid  1,000 mg  12/08/20 22:00  12/12/20 08:03





  Ascorbic Acid 500 Mg Tab  PO   1,000 mg





  DAILY STEPH   Administration


 


Dexamethasone Sodium Phosphate  6 mg  12/12/20 21:00 





  Dexamethasone Sod Phosphate 10 Mg/Ml 1 Ml Vial  IV  





  Q12HR STEPH  


 


Glimepiride  1 mg  12/09/20 07:30  12/12/20 06:53





  Glimepiride 1 Mg Tab  PO   1 mg





  AC-BRKFST STEPH   Administration


 


Sodium Chloride  1,000 mls @ 50 mls/hr  12/08/20 11:00  12/12/20 08:03





  Saline 0.9%  IV   50 mls/hr





  .Q20H STEPH   Administration


 


Remdesivir 100 mg/ Sodium  250 mls @ 250 mls/hr  12/09/20 22:00  12/11/20 21:11





  Chloride  IVPB  12/12/20 22:59  250 mls/hr





  Q24H STEPH   Administration


 


Ceftriaxone Sodium 1 gm/  50 mls @ 100 mls/hr  12/09/20 20:15  12/12/20 08:09





  Sodium Chloride  IVPB   100 mls/hr





  Q24HR STEPH   Administration


 


Azithromycin 500 mg/ Sodium  250 mls @ 250 mls/hr  12/10/20 18:00  12/11/20 

17:57





  Chloride  IVPB   250 mls/hr





  DAILY@1800 STEPH   Administration


 


Diltiazem HCl 125 mg/ Sodium  125 mls @ 20 mls/hr  12/11/20 23:00  12/12/20 

13:18





  Chloride  IV   20 mg/hr





  .Q6H15M STEPH   20 mls/hr





    Administration





  20 MG/HR  


 


Insulin Aspart  0 unit  12/06/20 07:30  12/12/20 12:05





  Insulin Aspart (Novolog) 100 Unit/Ml Vial  SQ   6 unit





  [facility]   Administration





  Protocol  


 


Insulin Detemir  30 unit  12/10/20 07:00  12/12/20 06:54





  Insulin Detemir (Levemir) 100 Unit/Ml Syr  SQ   30 unit





  DAILY@0700 [facility]   Administration


 


Metformin HCl  1,000 mg  12/09/20 07:30  12/12/20 06:54





  Metformin 500 Mg Tab  PO   1,000 mg





  BID-W/MEALS STEPH   Administration


 


Metoprolol Tartrate  75 mg  12/12/20 16:00  12/12/20 14:50





  Metoprolol Tartrate 25 Mg Tab  PO   75 mg





  TID STEPH   Administration


 


Naloxone HCl  0.2 mg  12/05/20 15:32 





  Naloxone 0.4 Mg/Ml 1 Ml Vial  IV  





  Q2M PRN  





  Opioid Reversal  


 


Naproxen  500 mg  12/05/20 23:05 





  Naproxen 250 Mg Tab  PO  





  BID PRN  





  Pain  


 


Pantoprazole Sodium  40 mg  12/06/20 09:00  12/12/20 08:03





  Pantoprazole 40 Mg Tablet  PO   40 mg





  DAILY STEPH   Administration


 


Rivaroxaban  20 mg  12/06/20 17:00  12/11/20 17:35





  Rivaroxaban 20 Mg Tab  PO   20 mg





  DAILY@1700 [facility]   Administration


 


Zinc Sulfate  220 mg  12/08/20 22:00  12/12/20 08:03





  Zinc Sulfate 220 Mg Cap  PO   220 mg





  DAILY STEPH   Administration














Objective





- Vital Signs


Vital signs: 


                                   Vital Signs











Temp  97.5 F L  12/12/20 11:35


 


Pulse  131 H  12/12/20 14:44


 


Resp  20   12/12/20 14:44


 


BP  108/71   12/12/20 14:44


 


Pulse Ox  90 L  12/12/20 14:44








                                 Intake & Output











 12/11/20 12/12/20 12/12/20





 18:59 06:59 18:59


 


Intake Total 100 921.167 900.5


 


Output Total  325 800


 


Balance 100 596.167 100.5


 


Weight  92.5 kg 


 


Intake:   


 


  Intake, IV Titration 100 81.167 120.5





  Amount   


 


    Diltiazem 125 mg In  81.167 120.5





    Sodium Chloride 0.9% 100   





    ml @ 10 MG/HR 10 mls/hr   





    IV .[id number] [facility] Rx#:   





    [id number]   


 


    cefTRIAXone 1 gm In 100  





    Sodium Chloride 0.9% 50   





    ml @ 100 mls/hr IVPB   





    Q24HR [facility] Rx#:[id number]   


 


  Oral  840 780


 


Output:   


 


  Urine  325 800


 


Other:   


 


  Voiding Method Toilet Toilet Toilet


 


  # Voids 3 1 


 


  # Bowel Movements   1














- Exam





GENERAL: The patient is alert and oriented x3, not in any acute distress. Well 

developed, well nourished. 


HEENT: Pupils are round and equally reacting to light. EOMI. No scleral icterus.

No conjunctival pallor. Normocephalic, atraumatic. No pharyngeal erythema. No 

thyromegaly. 


CARDIOVASCULAR: S1 and S2 present. No murmurs, rubs, or gallops. 


PULMONARY: Chest is clear to auscultation, no wheezing or crackles. 


ABDOMEN: Soft, nontender, nondistended, normoactive bowel sounds. No palpable 

organomegaly. 


MUSCULOSKELETAL: No joint swelling or deformity. 


EXTREMITIES: No cyanosis, clubbing, or pedal edema. 


NEUROLOGICAL: Gross neurological examination did not reveal any focal deficits. 


SKIN: No rashes. no petechiae.





- Labs


CBC & Chem 7: 


                                 12/12/20 14:32





                                 12/12/20 14:32


Labs: 


                  Abnormal Lab Results - Last 24 Hours (Table)











  12/11/20 12/11/20 12/12/20 Range/Units





  16:31 20:52 06:10 


 


WBC     (3.8-10.6)  k/uL


 


Plt Count     (150-450)  k/uL


 


Neutrophils #     (1.3-7.7)  k/uL


 


Lymphocytes #     (1.0-4.8)  k/uL


 


D-Dimer     (<0.60)  mg/L FEU


 


ABG pH     (7.35-7.45)  


 


ABG pCO2     (35-45)  mmHg


 


ABG pO2     ()  mmHg


 


ABG HCO3     (21-25)  mmol/L


 


ABG Total CO2     (19-24)  mmol/L


 


ABG O2 Saturation     (94-97)  %


 


Sodium     (137-145)  mmol/L


 


Chloride     ()  mmol/L


 


Carbon Dioxide     (22-30)  mmol/L


 


BUN     (9-20)  mg/dL


 


Glucose     (74-99)  mg/dL


 


POC Glucose (mg/dL)  257 H  217 H  154 H  (75-99)  mg/dL


 


Lactate Dehydrogenase     (313-618)  U/L


 


C-Reactive Protein     (<10.0)  mg/L


 


TSH     (0.465-4.680)  mIU/L














  12/12/20 12/12/20 12/12/20 Range/Units





  09:11 09:11 09:11 


 


WBC  14.4 H    (3.8-10.6)  k/uL


 


Plt Count     (150-450)  k/uL


 


Neutrophils #     (1.3-7.7)  k/uL


 


Lymphocytes #     (1.0-4.8)  k/uL


 


D-Dimer     (<0.60)  mg/L FEU


 


ABG pH     (7.35-7.45)  


 


ABG pCO2     (35-45)  mmHg


 


ABG pO2     ()  mmHg


 


ABG HCO3     (21-25)  mmol/L


 


ABG Total CO2     (19-24)  mmol/L


 


ABG O2 Saturation     (94-97)  %


 


Sodium   136 L   (137-145)  mmol/L


 


Chloride   109 H   ()  mmol/L


 


Carbon Dioxide   16 L   (22-30)  mmol/L


 


BUN   21 H   (9-20)  mg/dL


 


Glucose   268 H   (74-99)  mg/dL


 


POC Glucose (mg/dL)     (75-99)  mg/dL


 


Lactate Dehydrogenase     (313-618)  U/L


 


C-Reactive Protein     (<10.0)  mg/L


 


TSH    0.403 L  (0.465-4.680)  mIU/L














  12/12/20 12/12/20 12/12/20 Range/Units





  12:00 14:32 14:32 


 


WBC   14.9 H   (3.8-10.6)  k/uL


 


Plt Count   502 H   (150-450)  k/uL


 


Neutrophils #   13.4 H   (1.3-7.7)  k/uL


 


Lymphocytes #   0.8 L   (1.0-4.8)  k/uL


 


D-Dimer    3.15 H  (<0.60)  mg/L FEU


 


ABG pH     (7.35-7.45)  


 


ABG pCO2     (35-45)  mmHg


 


ABG pO2     ()  mmHg


 


ABG HCO3     (21-25)  mmol/L


 


ABG Total CO2     (19-24)  mmol/L


 


ABG O2 Saturation     (94-97)  %


 


Sodium     (137-145)  mmol/L


 


Chloride     ()  mmol/L


 


Carbon Dioxide     (22-30)  mmol/L


 


BUN     (9-20)  mg/dL


 


Glucose     (74-99)  mg/dL


 


POC Glucose (mg/dL)  271 H    (75-99)  mg/dL


 


Lactate Dehydrogenase     (313-618)  U/L


 


C-Reactive Protein     (<10.0)  mg/L


 


TSH     (0.465-4.680)  mIU/L














  12/12/20 12/12/20 Range/Units





  14:32 14:42 


 


WBC    (3.8-10.6)  k/uL


 


Plt Count    (150-450)  k/uL


 


Neutrophils #    (1.3-7.7)  k/uL


 


Lymphocytes #    (1.0-4.8)  k/uL


 


D-Dimer    (<0.60)  mg/L FEU


 


ABG pH   7.48 H  (7.35-7.45)  


 


ABG pCO2   22 L  (35-45)  mmHg


 


ABG pO2   58 L*  ()  mmHg


 


ABG HCO3   16 L  (21-25)  mmol/L


 


ABG Total CO2   17 L  (19-24)  mmol/L


 


ABG O2 Saturation   89.4 L  (94-97)  %


 


Sodium  135 L   (137-145)  mmol/L


 


Chloride    ()  mmol/L


 


Carbon Dioxide  17 L   (22-30)  mmol/L


 


BUN  22 H   (9-20)  mg/dL


 


Glucose  274 H   (74-99)  mg/dL


 


POC Glucose (mg/dL)    (75-99)  mg/dL


 


Lactate Dehydrogenase  858 H   (313-618)  U/L


 


C-Reactive Protein  24.5 H   (<10.0)  mg/L


 


TSH    (0.465-4.680)  mIU/L








                      Microbiology - Last 24 Hours (Table)











 12/05/20 13:35 Blood Culture - Final





 Blood    No Growth after 144 hours














Assessment and Plan


Assessment: 





Worsening pneumonia


Worsening acute hypoxic respiratory failure


A. fib with RVR


Fatigue generalized weakness secondary to Covid infection


New-onset diabetes


Hyponatremia due to dehydration


Dehydration


Gastroenteritis secondary to covid infection


Hypomagnesemia


Hypertension


History of MI


Plan: 





This is a pleasant 70 years old male who presents with complete infection 

without pneumonia, mild gastroenteritis and new diabetes.  Continue with zinc a

nd ascorbic acid, continue with gentle hydration.  Continue with metformin 1000 

mg twice daily and Amaryl 1 mg daily and also he is on Levemir 30 units daily.  

Continue with antibiotic ceftriaxone and Zithromax.


Pulmonary consult and continue with oxygen to keep saturation more than 20%.  

Cardiology input is appreciated, continue with Cardizem and amiodarone drip and 

continue with Lopressor and Xarelto


Labs and medication were reviewed..  Continue same treatment.  Continue with 

symptomatic treatment.  Resume home medication.  Monitor lytes and vitals.  DVT 

and GI prophylaxis.  Further recommendationsas per clinical course of the 

patient


DVT prophylaxis: XARELTO 


GI Prophylaxis: Pepcid


Prognosis is guarded

## 2020-12-12 NOTE — PN
PROGRESS NOTE



DATE OF SERVICE:

12/12/2020



REASON FOR FOLLOWUP:

Acute COVID-19 infection.



INTERVAL HISTORY:

Patient apparently did have atrial fibrillation with RVR and worsening of his

respiratory status for which the patient will be transferred to the telemetry unit.

The patient was started on amiodarone followed by Cardizem drip and the patient did

have worsening respiratory status requiring non-rebreather.  The patient denies having

any chest pain.  He did have a cough which is mild.  No sputum.  No nausea, no

vomiting.  No abdominal pain or diarrhea.



PHYSICAL EXAMINATION:

Blood pressure 125/88 with a pulse of 84, temperature 96.1.  He is 92% on

nonrebreather.  General description is an elderly male lying in bed in no distress.

Respiratory system: Unlabored breathing with decreased intensity of breath sounds.  No

wheeze.

HEART: S1, S2.  Irregular rate and rhythm.

ABDOMEN:  Soft, no tenderness.

EXTREMITIES:  No edema of the feet.



LABS:

Hemoglobin is 14.4, white count 14.9, BUN of 22, creatinine 0.97.



DIAGNOSTIC IMPRESSION/PLAN:

Patient with acute COVID-19 pneumonia in this patient treated with Remdesivir,

dexamethasone, Eliquis, in this patient did have worsening of his respiratory status,

possibly due to fluid overload from his underlying atrial fibrillation with RVR.  The

patient to continue with Remdesivir.  Dexamethasone dose has been adjusted up.  To

continue Xarelto and monitor clinical course closely.





MMODL / IJN: [id number] / Job#: [id number]

## 2020-12-12 NOTE — P.PN
Subjective


Progress Note Date: 12/12/20








We will ask to evaluate the patient again because of an acute shortness of 

breath that occurred overnight.  The patient became acutely dyspneic.  He was 

placed on 15 L of oxygen by nasal cannula.  Following that he was placed on 100%

nonrebreather facemask.  Note that during this time the patient also whether 

atrial fibrillation with rapid ventricular response.  Cardiology is involved and

the patient has been on amiodarone drip and later on Cardizem drip and 

subsequently amiodarone has been drop down to oral form.  Patient was also kept 

on Cardizem 20 mg an hour in addition to oral Lopressor.  The less, the patient 

continues to be in A. fib and he continues to be tachycardic and heart disease 

in the 130 range.  No fever.  No chills.  He has a dry cough.  Repeat chest x-

ray was done and the patient shows worsening in the bilateral pulmonary 

infiltrates and his presentation was consistent respiratory insufficiency and 

decompensation due to A. fib RVR in addition to coronavirus/COVID related p

neumonia.  The patient has been treated with Decadron the lung and the patient 

is currently also on Remdesivir.  Despite all this, the patient has remained 

hemodynamically stable.  No reported hypotension.  The electrolytes are all 

within normal limits..  The white cell count is at 14.4 with hemoglobin 14.5.








Objective





- Vital Signs


Vital signs: 


                                   Vital Signs











Temp  97.5 F L  12/12/20 11:35


 


Pulse  131 H  12/12/20 14:44


 


Resp  20   12/12/20 14:44


 


BP  108/71   12/12/20 14:44


 


Pulse Ox  90 L  12/12/20 14:44








                                 Intake & Output











 12/11/20 12/12/20 12/12/20





 18:59 06:59 18:59


 


Intake Total 100 921.167 900.5


 


Output Total  325 800


 


Balance 100 596.167 100.5


 


Weight  92.5 kg 


 


Intake:   


 


  Intake, IV Titration 100 81.167 120.5





  Amount   


 


    Diltiazem 125 mg In  81.167 120.5





    Sodium Chloride 0.9% 100   





    ml @ 10 MG/HR 10 mls/hr   





    IV .[id number] [facility] Rx#:   





    [id number]   


 


    cefTRIAXone 1 gm In 100  





    Sodium Chloride 0.9% 50   





    ml @ 100 mls/hr IVPB   





    Q24HR [facility] Rx#:[id number]   


 


  Oral  840 780


 


Output:   


 


  Urine  325 800


 


Other:   


 


  Voiding Method Toilet Toilet Toilet


 


  # Voids 3 1 


 


  # Bowel Movements   1














- Exam








GENERAL EXAM: Alert, pleasant 70-year-old gentleman, the patient is currently on

100% nonrebreather facemask.  The patient is a mild degree of respiratory 

distress.


HEAD: Normocephalic.


EYES: Normal reaction of pupils, equal size.


NOSE: Clear with pink turbinates.


THROAT: No erythema or exudates.


NECK: No masses, no JVD.


CHEST: No chest wall deformity.


LUNGS: Equal air entry with no crackles, wheeze, rhonchi or dullness.


CVS: Irregular rhythm with rapid ventricular response with atrial fibrillation, 

irregular S1-S2.  No significant murmurs appreciated.


ABDOMEN: No hepatosplenomegaly, normal bowel sounds, no guarding or rigidity.


SPINE: No scoliosis or deformity


SKIN: No rashes


CENTRAL NERVOUS SYSTEM: No focal deficits, tone is normal in all 4 extremities.


EXTREMITIES: There is no peripheral edema.  No clubbing, no cyanosis.  

Peripheral pulses are intact.








- Labs


CBC & Chem 7: 


                                 12/12/20 14:32





                                 12/12/20 09:11


Labs: 


                  Abnormal Lab Results - Last 24 Hours (Table)











  12/11/20 12/11/20 12/12/20 Range/Units





  16:31 20:52 06:10 


 


WBC     (3.8-10.6)  k/uL


 


Plt Count     (150-450)  k/uL


 


Neutrophils #     (1.3-7.7)  k/uL


 


Lymphocytes #     (1.0-4.8)  k/uL


 


Sodium     (137-145)  mmol/L


 


Chloride     ()  mmol/L


 


Carbon Dioxide     (22-30)  mmol/L


 


BUN     (9-20)  mg/dL


 


Glucose     (74-99)  mg/dL


 


POC Glucose (mg/dL)  257 H  217 H  154 H  (75-99)  mg/dL


 


TSH     (0.465-4.680)  mIU/L














  12/12/20 12/12/20 12/12/20 Range/Units





  09:11 09:11 09:11 


 


WBC  14.4 H    (3.8-10.6)  k/uL


 


Plt Count     (150-450)  k/uL


 


Neutrophils #     (1.3-7.7)  k/uL


 


Lymphocytes #     (1.0-4.8)  k/uL


 


Sodium   136 L   (137-145)  mmol/L


 


Chloride   109 H   ()  mmol/L


 


Carbon Dioxide   16 L   (22-30)  mmol/L


 


BUN   21 H   (9-20)  mg/dL


 


Glucose   268 H   (74-99)  mg/dL


 


POC Glucose (mg/dL)     (75-99)  mg/dL


 


TSH    0.403 L  (0.465-4.680)  mIU/L














  12/12/20 12/12/20 Range/Units





  12:00 14:32 


 


WBC   14.9 H  (3.8-10.6)  k/uL


 


Plt Count   502 H  (150-450)  k/uL


 


Neutrophils #   13.4 H  (1.3-7.7)  k/uL


 


Lymphocytes #   0.8 L  (1.0-4.8)  k/uL


 


Sodium    (137-145)  mmol/L


 


Chloride    ()  mmol/L


 


Carbon Dioxide    (22-30)  mmol/L


 


BUN    (9-20)  mg/dL


 


Glucose    (74-99)  mg/dL


 


POC Glucose (mg/dL)  271 H   (75-99)  mg/dL


 


TSH    (0.465-4.680)  mIU/L








                      Microbiology - Last 24 Hours (Table)











 12/05/20 13:35 Blood Culture - Final





 Blood    No Growth after 144 hours














Assessment and Plan


Plan: 











1 acute hypoxic respiratory failure with development of peripheral bilateral 

pulmonary infiltrates, most likely consistent with progression of acute 

coronavirus/Covid 19 related pneumonia.  The patient was receiving a combination

of Decadron and Remdesivir.  There may be also further decompensation in his 

respiratory status because of A. fib/RVR.  No overt signs of decompensated heart

failure at this point in time.





2 new onset atrial fibrillation with rapid ventricular response currently on a 

combination of metoprolol, Cardizem drip and oral amiodarone.  Cardiology on the

case.  LV function is preserved.





3 dyspnea secondary to above





4  right upper lobe lung nodule being followed in the outpatient setting





5 History of left upper lobe pulmonary embolism, anticoagulated with Xarelto





6 Hypertension





7 Former smoker





8 History of coronary disease





9 History of gout





10 GERD





Plan:





Continue Decadron and Remdesivir


Keep the patient on the 100% percent nonrebreather facemask


Obtain a blood gas


Give the patient dose of Lasix 40 mg IV push


Check cardiac enzymes


Check proBNP level


Given additional dose of amiodarone 150 mg bolus and consider cardioversion.


Keep Cardizem drip


Keep oral beta blockers 


may need to come to the ICU and later stage.  We'll continue to follow.

## 2020-12-13 LAB
ALBUMIN SERPL-MCNC: 2.8 G/DL (ref 3.5–5)
ALP SERPL-CCNC: 65 U/L (ref 38–126)
ALT SERPL-CCNC: 36 U/L (ref 4–49)
ANION GAP SERPL CALC-SCNC: 7 MMOL/L
AST SERPL-CCNC: 30 U/L (ref 17–59)
BASOPHILS # BLD AUTO: 0.1 K/UL (ref 0–0.2)
BASOPHILS NFR BLD AUTO: 1 %
BUN SERPL-SCNC: 25 MG/DL (ref 9–20)
CALCIUM SPEC-MCNC: 8.3 MG/DL (ref 8.4–10.2)
CHLORIDE SERPL-SCNC: 108 MMOL/L (ref 98–107)
CO2 BLDA-SCNC: 19 MMOL/L (ref 19–24)
CO2 SERPL-SCNC: 20 MMOL/L (ref 22–30)
D DIMER PPP FEU-MCNC: 5.77 MG/L FEU (ref ?–0.6)
EOSINOPHIL # BLD AUTO: 0.1 K/UL (ref 0–0.7)
EOSINOPHIL NFR BLD AUTO: 1 %
ERYTHROCYTE [DISTWIDTH] IN BLOOD BY AUTOMATED COUNT: 4.13 M/UL (ref 4.3–5.9)
ERYTHROCYTE [DISTWIDTH] IN BLOOD: 12.7 % (ref 11.5–15.5)
FERRITIN SERPL-MCNC: 639.9 NG/ML (ref 22–322)
FIBRINOGEN PPP-MCNC: 538 MG/DL (ref 200–500)
GLUCOSE BLD-MCNC: 155 MG/DL (ref 75–99)
GLUCOSE BLD-MCNC: 171 MG/DL (ref 75–99)
GLUCOSE BLD-MCNC: 184 MG/DL (ref 75–99)
GLUCOSE BLD-MCNC: 205 MG/DL (ref 75–99)
GLUCOSE BLD-MCNC: 220 MG/DL (ref 75–99)
GLUCOSE SERPL-MCNC: 169 MG/DL (ref 74–99)
HCO3 BLDA-SCNC: 18 MMOL/L (ref 21–25)
HCT VFR BLD AUTO: 38.8 % (ref 39–53)
HGB BLD-MCNC: 12.7 GM/DL (ref 13–17.5)
LDH SPEC-CCNC: 904 U/L (ref 313–618)
LYMPHOCYTES # SPEC AUTO: 0.6 K/UL (ref 1–4.8)
LYMPHOCYTES NFR SPEC AUTO: 5 %
MCH RBC QN AUTO: 30.8 PG (ref 25–35)
MCHC RBC AUTO-ENTMCNC: 32.8 G/DL (ref 31–37)
MCV RBC AUTO: 94 FL (ref 80–100)
MONOCYTES # BLD AUTO: 0.5 K/UL (ref 0–1)
MONOCYTES NFR BLD AUTO: 3 %
NEUTROPHILS # BLD AUTO: 12.1 K/UL (ref 1.3–7.7)
NEUTROPHILS NFR BLD AUTO: 90 %
PCO2 BLDA: 27 MMHG (ref 35–45)
PH BLDA: 7.45 [PH] (ref 7.35–7.45)
PLATELET # BLD AUTO: 312 K/UL (ref 150–450)
PO2 BLDA: 77 MMHG (ref 83–108)
POTASSIUM SERPL-SCNC: 4.1 MMOL/L (ref 3.5–5.1)
PROT SERPL-MCNC: 5.5 G/DL (ref 6.3–8.2)
SODIUM SERPL-SCNC: 135 MMOL/L (ref 137–145)
WBC # BLD AUTO: 13.5 K/UL (ref 3.8–10.6)

## 2020-12-13 RX ADMIN — METOPROLOL TARTRATE SCH MG: 25 TABLET, FILM COATED ORAL at 09:51

## 2020-12-13 RX ADMIN — INSULIN ASPART SCH UNIT: 100 INJECTION, SOLUTION INTRAVENOUS; SUBCUTANEOUS at 17:26

## 2020-12-13 RX ADMIN — CEFAZOLIN SCH MLS/HR: 330 INJECTION, POWDER, FOR SOLUTION INTRAMUSCULAR; INTRAVENOUS at 04:13

## 2020-12-13 RX ADMIN — RIVAROXABAN SCH MG: 20 TABLET, FILM COATED ORAL at 16:56

## 2020-12-13 RX ADMIN — OXYCODONE HYDROCHLORIDE AND ACETAMINOPHEN SCH MG: 500 TABLET ORAL at 09:51

## 2020-12-13 RX ADMIN — INSULIN ASPART SCH UNIT: 100 INJECTION, SOLUTION INTRAVENOUS; SUBCUTANEOUS at 22:04

## 2020-12-13 RX ADMIN — INSULIN DETEMIR SCH: 100 INJECTION, SOLUTION SUBCUTANEOUS at 09:38

## 2020-12-13 RX ADMIN — CEFAZOLIN SCH MLS/HR: 330 INJECTION, POWDER, FOR SOLUTION INTRAMUSCULAR; INTRAVENOUS at 09:50

## 2020-12-13 RX ADMIN — DEXTROSE SCH MG: 50 INJECTION, SOLUTION INTRAVENOUS at 20:31

## 2020-12-13 RX ADMIN — DILTIAZEM HYDROCHLORIDE SCH MLS/HR: 5 INJECTION INTRAVENOUS at 11:22

## 2020-12-13 RX ADMIN — INSULIN ASPART SCH UNIT: 100 INJECTION, SOLUTION INTRAVENOUS; SUBCUTANEOUS at 11:54

## 2020-12-13 RX ADMIN — PANTOPRAZOLE SODIUM SCH MG: 40 TABLET, DELAYED RELEASE ORAL at 09:51

## 2020-12-13 RX ADMIN — DEXTROSE SCH MG: 50 INJECTION, SOLUTION INTRAVENOUS at 09:50

## 2020-12-13 RX ADMIN — METOPROLOL TARTRATE SCH MG: 25 TABLET, FILM COATED ORAL at 21:28

## 2020-12-13 RX ADMIN — DILTIAZEM HYDROCHLORIDE SCH MLS/HR: 5 INJECTION INTRAVENOUS at 04:12

## 2020-12-13 RX ADMIN — AZITHROMYCIN MONOHYDRATE SCH MLS/HR: 500 INJECTION, POWDER, LYOPHILIZED, FOR SOLUTION INTRAVENOUS at 16:57

## 2020-12-13 RX ADMIN — DILTIAZEM HYDROCHLORIDE SCH MLS/HR: 5 INJECTION INTRAVENOUS at 20:32

## 2020-12-13 RX ADMIN — GLIMEPIRIDE SCH MG: 1 TABLET ORAL at 07:12

## 2020-12-13 RX ADMIN — AMIODARONE HYDROCHLORIDE SCH MG: 200 TABLET ORAL at 20:31

## 2020-12-13 RX ADMIN — Medication SCH MG: at 09:51

## 2020-12-13 RX ADMIN — AMIODARONE HYDROCHLORIDE SCH MG: 200 TABLET ORAL at 09:51

## 2020-12-13 RX ADMIN — METOPROLOL TARTRATE SCH MG: 25 TABLET, FILM COATED ORAL at 16:57

## 2020-12-13 RX ADMIN — INSULIN ASPART SCH UNIT: 100 INJECTION, SOLUTION INTRAVENOUS; SUBCUTANEOUS at 07:12

## 2020-12-13 NOTE — P.PN
Subjective


From Records


Mr. Chavez is a 70-year-old male,  who is a patient of Dr. Stovall with a past 

medical history of hypertension,MI coming in with a chief complaint of fatigue 

and generalized weakness that have been ongoing for past 1 week.  Patient states

that he has been having poor appetite loss of smell and loss of taste for the 

past 1 week.  He states that he has been sleeping for more than 20 hours a day. 

Patient denies having any cough or difficulty in breathing.  He denies having 

any chest pain or palpitations.  He denies having any dysuria or hematuria.  

Patient denies having any history of diabetes but his blood sugars are in 300s 

to 400s.  In the emergency room patient had a chest x-ray showing no acute 

cardiopulmonary process and an EKG showing normal sinus rhythm.  He had labs 

done showing white count of 3.9, hemoglobin 14.4, platelets 166.  Sodium 131, 

potassium 3.9, chloride.  BUN 15, creatinine 0.93 C-reactive protein 59, albumin

3.3 coronavirus PCR positive. 





On 12/6/2020 patient was seen and examined.  He is currently resting comfortably

in bed.  Appears to be no acute distress.  On reviewing the vitals patient guillermo

nues to fever as high as 102.3.  His blood pressure has been stable around 130s 

80s and he saturating at 95% on room air.  Patient's blood sugars have been 

running on the higher side.





On 12/07/2020- patient was seen and examined and the general medical floors.  He

is resting comfortably in bed.  Overnight active issues reported by nursing 

staff. Patient states that his difficulty in breathing is improving.  On 

reviewing the vitals patient's T-max is 98.4, heart rate 72, respiratory rate 

127-79 and saturating at 93% on room air.  On reviewing vitals patient's white 

count of 2.8, hemoglobin 14.4, platelets 189.  Sodium 136, potassium 4.6, 

chloride 102.  BUN 23 and creatinine of 1.0.  Blood sugars have been running 

high in 200s to 300s.  C-reactive protein 7.5.








12/08/2020


This is a pleasant 70 years old male who presents with cough with infection and 

found to have new onset diabetes with elevated hemoglobin A1c at 14%.


Patient currently with no respiratory symptoms, no chest pain or dyspnea 

coughing.  Yesterday he has a regular bowel movement but today he had 1 loose 

bowel movement but no significant abdominal pain or nausea vomiting.


Also he is running a fever of 101.  He saturating 90% at room air.


BMP and CBC were reviewed and they were unremarkable except for mild leukopenia 

2.8K.  D-dimer is negative at 0.34.  Coronavirus is detected.  Chest x-ray is 

normal


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  Normal saline at 75 mL/h was 

added.  Also Levemir 10 units daily and metformin 1000 mg twice daily has been 

added because his sugar still not controlled


Patient is able to eat % of his meal





12/9/20


Patient had a rough night because of fever, his breathing is quiet and stable 

and he's only on 2 L oxygen.  Is still have diarrhea about twice per day which 

is similar to the day before.  No abdominal pain or vomiting.  No significant 

coughing.  unstable


Chest x-ray: Worsening infiltrates, Pronecalcitonin is elevated at 0.27, 

patient is started on Rocephin and Zithromax.


He is new onset diabetes and also is on dexamethasone and his sugar more than 

300, so Levemir was decreased from 10-30 units daily.  Also he is on metformin 

1000 and Amaryl 1 mg.


Continue on Xarelto, normocephalic 75, remedisivr, dexamethasone 6 mg daily.





12/10/2020


Patient hadn't good night  because he could not sleep.  No respiratory symptoms,

ongoing generalized weakness.


Yesterday he has watery diarrhea but this morning he had small more formed bowel

movement.  No abdominal pain.


Persistent fever have subsided and his been afebrile for more than 24 hours.  We

will check Tylenol as needed


His sugar is better controlled after increasing his insulin to 30 units.


Antibiotics with ceftriaxone and Zithromax were admitted with high 

proteincalcitonin.  We will try to check sputum culture.


Check labs in the morning





12/11/2020


Patient feels much better today, his fever subsided and his on Tylenol as needed

currently.  No respiratory issue or symptom.  He had a little formed bowel 

movement today.  Currently patient remains on the same cocktail for comfort 

infection besides remedisivr


Hemodynamically stable.  Vitals are stable showing mild leukocytosis of 11.6 K





12/12/2020


pt is moved to [facility] unit , pt developed a fib and RVR and he was started on 

Cardizem and amiodarone drip and is already on Xarelto. 


Also patient on Lopressor 75 mg 3 times a day Also he developed more hypoxic and

is currently on 50 L oxygen via nasal cannula saturating 90%.  


Repeat chest x-ray Moderate peripheral-based opacities throughout both lungs 

with no pleural effusion.  Blood gas on BOTH HIS WITH PH 7.48, LOW PCO2 OF 22 

AND LOW PO2 AT 58 PATIENT HAS BEEN EVALUATED BY PULMONARY SERVICE AND PATIENT 

MAY NEED TO GO TO THE ICU AS WELL.


LEFT SHOWING STABLE LEUKOCYTOSIS AT 14.9 K, ELEVATED D-DIMER 3.1, BMP IS 

UNREMARKABLE AND WORSENING OF THE EDGE AND C-REACTIVE PROTEIN


Patient is kept on remedisivr, dexamethasone 6 mg daily.


However patient diarrhea has improved 








12/13/2020


Patient today kept in the ICU on BiPAP saturating 100% however he looks 

comfortable.  Distal short of breath and dyspneic


Rest of vitals are stable


Laps looks stable, inflammatory markers LDH and C-reactive protein still 

trending up slowly compared to yesterday.proCalcitonin is negative.  D-dimer is

trending up at 5.7


Chest x-ray showing stable bilateral lung infiltrates


He remains on Decadron and remdesivir


Also I kept on Cardizem drip at 20 mg an hour and amiodarone 400 mg twice daily 

and metoprolol 75 mg 3 times a day, vitamin C and A. fib and heart rate ranging 

between .


He has an echo from 07/22/2020 showed ejection fraction of 50-55%.  BNP is 

elevated at 3940.


Discontinue ceftriaxone.  Keep Zithromax for now.  Patient to continue on 

Xarelto 


patient is nothing by mouth WE'LL KEEP HIM ON GENTLE HYDRATION AT 50 ML/H.  ALSO

GOT 1 DOSE OF LASIX








CONSTITUTIONAL: No fever, no malaise, no fatigue. 


HEENT: No recent visual problems or hearing problems. Denied any sore throat. 


CARDIOVASCULAR: No  orthopnea, PND, no palpitations, no syncope. 


PULMONARY: No shortness of breath, no cough, no hemoptysis. 


GASTROINTESTINAL: No diarrhea, no nausea, no vomiting, no abdominal pain. 

Normoactive bowel sounds. 


NEUROLOGICAL: No headaches, no weakness, no numbness. 


                               Active Medications











Generic Name Dose Route Start Last Admin





  Trade Name Freq  PRN Reason Stop Dose Admin


 


Acetaminophen  325 mg  12/08/20 22:00  12/08/20 23:48





  Acetaminophen Tab 325 Mg Tab  PO   325 mg





  Q6HR PRN   Administration





  Mild Pain or Fever > 100.5  


 


Allopurinol  300 mg  12/06/20 09:00  12/13/20 09:51





  Allopurinol 300 Mg Tab  PO   300 mg





  DAILY STEPH   Administration


 


Amiodarone HCl  400 mg  12/12/20 21:00  12/13/20 09:51





  Amiodarone 200 Mg Tab  PO   400 mg





  BID STEPH   Administration


 


Ascorbic Acid  1,000 mg  12/08/20 22:00  12/13/20 09:51





  Ascorbic Acid 500 Mg Tab  PO   1,000 mg





  DAILY STEPH   Administration


 


Dexamethasone Sodium Phosphate  6 mg  12/12/20 21:00  12/13/20 09:50





  Dexamethasone Sod Phosphate 10 Mg/Ml 1 Ml Vial  IV   6 mg





  Q12HR STEPH   Administration


 


Sodium Chloride  1,000 mls @ 50 mls/hr  12/08/20 11:00  12/13/20 09:50





  Saline 0.9%  IV   50 mls/hr





  .Q20H STEPH   Administration


 


Ceftriaxone Sodium 1 gm/  50 mls @ 100 mls/hr  12/09/20 20:15  12/13/20 09:12





  Sodium Chloride  IVPB   100 mls/hr





  Q24HR STEPH   Administration


 


Azithromycin 500 mg/ Sodium  250 mls @ 250 mls/hr  12/10/20 18:00  12/12/20 

17:26





  Chloride  IVPB   250 mls/hr





  DAILY@1800 STEPH   Administration


 


Diltiazem HCl 125 mg/ Sodium  125 mls @ 20 mls/hr  12/11/20 23:00  12/13/20 

11:22





  Chloride  IV   20 mg/hr





  .Q6H15M STEPH   20 mls/hr





    Administration





  20 MG/HR  


 


Insulin Aspart  0 unit  12/06/20 07:30  12/13/20 11:54





  Insulin Aspart (Novolog) 100 Unit/Ml Vial  SQ   2 unit





  ACHS STEPH   Administration





  Protocol  


 


Metoprolol Tartrate  75 mg  12/12/20 16:00  12/13/20 09:51





  Metoprolol Tartrate 25 Mg Tab  PO   75 mg





  TID STEPH   Administration


 


Naloxone HCl  0.2 mg  12/05/20 15:32 





  Naloxone 0.4 Mg/Ml 1 Ml Vial  IV  





  Q2M PRN  





  Opioid Reversal  


 


Naproxen  500 mg  12/05/20 23:05 





  Naproxen 250 Mg Tab  PO  





  BID PRN  





  Pain  


 


Pantoprazole Sodium  40 mg  12/06/20 09:00  12/13/20 09:51





  Pantoprazole 40 Mg Tablet  PO   40 mg





  DAILY STEPH   Administration


 


Rivaroxaban  20 mg  12/06/20 17:00  12/12/20 17:28





  Rivaroxaban 20 Mg Tab  PO   20 mg





  DAILY@1700 STEPH   Administration


 


Zinc Sulfate  220 mg  12/08/20 22:00  12/13/20 09:51





  Zinc Sulfate 220 Mg Cap  PO   220 mg





  DAILY STEPH   Administration














Objective





- Vital Signs


Vital signs: 


                                   Vital Signs











Temp  97.0 F L  12/13/20 12:00


 


Pulse  86   12/13/20 13:00


 


Resp  16   12/13/20 13:00


 


BP  113/95   12/13/20 13:00


 


Pulse Ox  92 L  12/13/20 13:00








                                 Intake & Output











 12/12/20 12/13/20 12/13/20





 18:59 06:59 18:59


 


Intake Total 1059.833 778.666 525


 


Output Total 2100 725 1500


 


Balance -1040.167 53.666 -975


 


Weight   92.5 kg


 


Intake:   


 


  IV  300 350


 


    Sodium Chloride 0.9% 1,  300 350





    000 ml @ 50 mls/hr IV .   





    Q20H [facility] Rx#:[id number]   


 


  Intake, IV Titration 179.833 238.666 175





  Amount   


 


    Diltiazem 125 mg In 179.833 238.666 125





    Sodium Chloride 0.9% 100   





    ml @ 20 MG/HR 20 mls/hr   





    IV .Q6H15M [facility] Rx#:   





    [id number]   


 


    cefTRIAXone 1 gm In   50





    Sodium Chloride 0.9% 50   





    ml @ 100 mls/hr IVPB   





    Q24HR [facility] Rx#:[id number]   


 


  Oral 880 240 


 


Output:   


 


  Urine 2100 725 1500


 


Other:   


 


  Voiding Method Toilet Urinal Urinal


 


  # Voids  1 


 


  # Bowel Movements 1  














- Exam





GENERAL: The patient is alert and oriented x3, not in any acute distress. Well 

developed, well nourished. 


HEENT: Pupils are round and equally reacting to light. EOMI. No scleral icterus.

No conjunctival pallor. Normocephalic, atraumatic. No pharyngeal erythema. No 

thyromegaly. 


CARDIOVASCULAR: S1 and S2 present. No murmurs, rubs, or gallops. 


PULMONARY: Chest is clear to auscultation, no wheezing or crackles. 


ABDOMEN: Soft, nontender, nondistended, normoactive bowel sounds. No palpable 

organomegaly. 


MUSCULOSKELETAL: No joint swelling or deformity. 


EXTREMITIES: No cyanosis, clubbing, or pedal edema. 


NEUROLOGICAL: Gross neurological examination did not reveal any focal deficits. 


SKIN: No rashes. no petechiae.





- Labs


CBC & Chem 7: 


                                 12/13/20 04:13





                                 12/13/20 04:13


Labs: 


                  Abnormal Lab Results - Last 24 Hours (Table)











  12/12/20 12/12/20 12/12/20 Range/Units





  14:32 14:32 14:32 


 


WBC  14.9 H    (3.8-10.6)  k/uL


 


RBC     (4.30-5.90)  m/uL


 


Hgb     (13.0-17.5)  gm/dL


 


Hct     (39.0-53.0)  %


 


Plt Count  502 H    (150-450)  k/uL


 


Neutrophils #  13.4 H    (1.3-7.7)  k/uL


 


Lymphocytes #  0.8 L    (1.0-4.8)  k/uL


 


Fibrinogen     (200-500)  mg/dL


 


D-Dimer   3.15 H   (<0.60)  mg/L FEU


 


ABG pH     (7.35-7.45)  


 


ABG pCO2     (35-45)  mmHg


 


ABG pO2     ()  mmHg


 


ABG HCO3     (21-25)  mmol/L


 


ABG Total CO2     (19-24)  mmol/L


 


ABG O2 Saturation     (94-97)  %


 


Sodium    135 L  (137-145)  mmol/L


 


Chloride     ()  mmol/L


 


Carbon Dioxide    17 L  (22-30)  mmol/L


 


BUN    22 H  (9-20)  mg/dL


 


Glucose    274 H  (74-99)  mg/dL


 


POC Glucose (mg/dL)     (75-99)  mg/dL


 


Calcium     (8.4-10.2)  mg/dL


 


Ferritin     (22.0-322.0)  ng/mL


 


Lactate Dehydrogenase    858 H  (313-618)  U/L


 


Troponin I     (0.000-0.034)  ng/mL


 


C-Reactive Protein    24.5 H  (<10.0)  mg/L


 


Total Protein     (6.3-8.2)  g/dL


 


Albumin     (3.5-5.0)  g/dL














  12/12/20 12/12/20 12/12/20 Range/Units





  14:32 14:42 16:34 


 


WBC     (3.8-10.6)  k/uL


 


RBC     (4.30-5.90)  m/uL


 


Hgb     (13.0-17.5)  gm/dL


 


Hct     (39.0-53.0)  %


 


Plt Count     (150-450)  k/uL


 


Neutrophils #     (1.3-7.7)  k/uL


 


Lymphocytes #     (1.0-4.8)  k/uL


 


Fibrinogen     (200-500)  mg/dL


 


D-Dimer     (<0.60)  mg/L FEU


 


ABG pH   7.48 H   (7.35-7.45)  


 


ABG pCO2   22 L   (35-45)  mmHg


 


ABG pO2   58 L*   ()  mmHg


 


ABG HCO3   16 L   (21-25)  mmol/L


 


ABG Total CO2   17 L   (19-24)  mmol/L


 


ABG O2 Saturation   89.4 L   (94-97)  %


 


Sodium     (137-145)  mmol/L


 


Chloride     ()  mmol/L


 


Carbon Dioxide     (22-30)  mmol/L


 


BUN     (9-20)  mg/dL


 


Glucose     (74-99)  mg/dL


 


POC Glucose (mg/dL)    228 H  (75-99)  mg/dL


 


Calcium     (8.4-10.2)  mg/dL


 


Ferritin     (22.0-322.0)  ng/mL


 


Lactate Dehydrogenase     (313-618)  U/L


 


Troponin I  0.142 H*    (0.000-0.034)  ng/mL


 


C-Reactive Protein     (<10.0)  mg/L


 


Total Protein     (6.3-8.2)  g/dL


 


Albumin     (3.5-5.0)  g/dL














  12/12/20 12/13/20 12/13/20 Range/Units





  20:00 00:00 04:13 


 


WBC    13.5 H  (3.8-10.6)  k/uL


 


RBC    4.13 L  (4.30-5.90)  m/uL


 


Hgb    12.7 L  (13.0-17.5)  gm/dL


 


Hct    38.8 L  (39.0-53.0)  %


 


Plt Count     (150-450)  k/uL


 


Neutrophils #    12.1 H  (1.3-7.7)  k/uL


 


Lymphocytes #    0.6 L  (1.0-4.8)  k/uL


 


Fibrinogen     (200-500)  mg/dL


 


D-Dimer     (<0.60)  mg/L FEU


 


ABG pH     (7.35-7.45)  


 


ABG pCO2     (35-45)  mmHg


 


ABG pO2     ()  mmHg


 


ABG HCO3     (21-25)  mmol/L


 


ABG Total CO2     (19-24)  mmol/L


 


ABG O2 Saturation     (94-97)  %


 


Sodium     (137-145)  mmol/L


 


Chloride     ()  mmol/L


 


Carbon Dioxide     (22-30)  mmol/L


 


BUN     (9-20)  mg/dL


 


Glucose     (74-99)  mg/dL


 


POC Glucose (mg/dL)  227 H  155 H   (75-99)  mg/dL


 


Calcium     (8.4-10.2)  mg/dL


 


Ferritin     (22.0-322.0)  ng/mL


 


Lactate Dehydrogenase     (313-618)  U/L


 


Troponin I     (0.000-0.034)  ng/mL


 


C-Reactive Protein     (<10.0)  mg/L


 


Total Protein     (6.3-8.2)  g/dL


 


Albumin     (3.5-5.0)  g/dL














  12/13/20 12/13/20 12/13/20 Range/Units





  04:13 04:13 07:03 


 


WBC     (3.8-10.6)  k/uL


 


RBC     (4.30-5.90)  m/uL


 


Hgb     (13.0-17.5)  gm/dL


 


Hct     (39.0-53.0)  %


 


Plt Count     (150-450)  k/uL


 


Neutrophils #     (1.3-7.7)  k/uL


 


Lymphocytes #     (1.0-4.8)  k/uL


 


Fibrinogen  538 H    (200-500)  mg/dL


 


D-Dimer  5.77 H    (<0.60)  mg/L FEU


 


ABG pH     (7.35-7.45)  


 


ABG pCO2     (35-45)  mmHg


 


ABG pO2     ()  mmHg


 


ABG HCO3     (21-25)  mmol/L


 


ABG Total CO2     (19-24)  mmol/L


 


ABG O2 Saturation     (94-97)  %


 


Sodium   135 L   (137-145)  mmol/L


 


Chloride   108 H   ()  mmol/L


 


Carbon Dioxide   20 L   (22-30)  mmol/L


 


BUN   25 H   (9-20)  mg/dL


 


Glucose   169 H   (74-99)  mg/dL


 


POC Glucose (mg/dL)    184 H  (75-99)  mg/dL


 


Calcium   8.3 L   (8.4-10.2)  mg/dL


 


Ferritin   639.9 H   (22.0-322.0)  ng/mL


 


Lactate Dehydrogenase   904 H   (313-618)  U/L


 


Troponin I     (0.000-0.034)  ng/mL


 


C-Reactive Protein   31.2 H   (<10.0)  mg/L


 


Total Protein   5.5 L   (6.3-8.2)  g/dL


 


Albumin   2.8 L   (3.5-5.0)  g/dL














  12/13/20 12/13/20 12/13/20 Range/Units





  08:34 08:40 11:38 


 


WBC     (3.8-10.6)  k/uL


 


RBC     (4.30-5.90)  m/uL


 


Hgb     (13.0-17.5)  gm/dL


 


Hct     (39.0-53.0)  %


 


Plt Count     (150-450)  k/uL


 


Neutrophils #     (1.3-7.7)  k/uL


 


Lymphocytes #     (1.0-4.8)  k/uL


 


Fibrinogen     (200-500)  mg/dL


 


D-Dimer     (<0.60)  mg/L FEU


 


ABG pH     (7.35-7.45)  


 


ABG pCO2  27 L    (35-45)  mmHg


 


ABG pO2  77 L    ()  mmHg


 


ABG HCO3  18 L    (21-25)  mmol/L


 


ABG Total CO2     (19-24)  mmol/L


 


ABG O2 Saturation  93.8 L    (94-97)  %


 


Sodium     (137-145)  mmol/L


 


Chloride     ()  mmol/L


 


Carbon Dioxide     (22-30)  mmol/L


 


BUN     (9-20)  mg/dL


 


Glucose     (74-99)  mg/dL


 


POC Glucose (mg/dL)    171 H  (75-99)  mg/dL


 


Calcium     (8.4-10.2)  mg/dL


 


Ferritin     (22.0-322.0)  ng/mL


 


Lactate Dehydrogenase     (313-618)  U/L


 


Troponin I   0.066 H*   (0.000-0.034)  ng/mL


 


C-Reactive Protein     (<10.0)  mg/L


 


Total Protein     (6.3-8.2)  g/dL


 


Albumin     (3.5-5.0)  g/dL














Assessment and Plan


Assessment: 





Worsening pneumonia


Worsening acute hypoxic respiratory failure


A. fib with RVR


Fatigue generalized weakness secondary to Covid infection


New-onset diabetes


Hyponatremia due to dehydration


Dehydration


Gastroenteritis secondary to covid infection


Hypomagnesemia


Hypertension


History of MI


Plan: 





This is a pleasant 70 years old male who presents with complete infection 

without pneumonia, mild gastroenteritis and new diabetes.  Also A. fib with RVR 

Continue with zinc and ascorbic acid, continue with gentle hydration.  Continue 

with metformin 1000 mg twice daily and Amaryl 1 mg daily and also he is on L

evemir 30 units daily.  Continue with antibiotic ceftriaxone and Zithromax.


Pulmonary consult and continue with oxygen to keep saturation more than 20%.  

Cardiology input is appreciated, continue with Cardizem and amiodarone drip and 

continue with Lopressor and Xarelto


Labs and medication were reviewed..  Continue same treatment.  Continue with 

symptomatic treatment.  Resume home medication.  Monitor lytes and vitals.  DVT 

and GI prophylaxis.  Further recommendationsas per clinical course of the 

patient


DVT prophylaxis: XARELTO 


GI Prophylaxis: Pepcid


Prognosis is guarded

## 2020-12-13 NOTE — PN
PROGRESS NOTE



DATE OF SERVICE:

12/13/2020



REASON FOR FOLLOWUP:

COVID-19 infection.



INTERVAL HISTORY:

The patient has been transferred down to the ICU because of hypoxemia and need for the

BiPAP.  The patient denies having any chest pain.  He did have minimal cough, not

bringing up any sputum. No nausea, no vomiting, No abdominal pain, no diarrhea.



PHYSICAL EXAMINATION:

Blood pressure 128/80 with a pulse of 96, temperature 98. He is 95% on BiPAP.

General description is an elderly male lying in bed in no distress.

RESPIRATORY SYSTEM: Unlabored breathing with decreased intensity of breath sounds. No

wheeze.

HEART: S1, S2.  Regular rate and rhythm.

ABDOMEN:  Soft, no tenderness.



LABS:

Hemoglobin is 12.7, white count of 13.5.  D-dimer is elevated 5.77. Creatinine 0.95.



DIAGNOSTIC IMPRESSION AND PLAN:

Patient with acute COVID-19 infection in this patient treated with remdesivir, patient

had completed his 5-day course.  Also on Xarelto, zinc sulfate and dexamethasone. To

continue with respiratory support and monitor his clinical course closely.





MMODL / IJN: [id number] / Job#: [id number]

## 2020-12-13 NOTE — XR
EXAMINATION TYPE: XR chest 1V

 

DATE OF EXAM: 12/13/2020

 

COMPARISON: 12/12/2020

 

INDICATION: Follow-up previous abnormal

 

TECHNIQUE: Single frontal view of the chest is obtained.

 

FINDINGS:  

The heart size is mildly prominent.  

The pulmonary vasculature is prominent.  

Diffuse peripheral infiltrates are present bilaterally. Findings are stable  

 

 

IMPRESSION:  

1. Stable bilateral lung infiltrates.

2. Mild stable cardiomegaly

## 2020-12-14 LAB
ALBUMIN SERPL-MCNC: 2.6 G/DL (ref 3.5–5)
ALP SERPL-CCNC: 68 U/L (ref 38–126)
ALT SERPL-CCNC: 36 U/L (ref 4–49)
ANION GAP SERPL CALC-SCNC: 3 MMOL/L
AST SERPL-CCNC: 29 U/L (ref 17–59)
BASOPHILS # BLD AUTO: 0 K/UL (ref 0–0.2)
BASOPHILS NFR BLD AUTO: 0 %
BUN SERPL-SCNC: 29 MG/DL (ref 9–20)
CALCIUM SPEC-MCNC: 7.8 MG/DL (ref 8.4–10.2)
CHLORIDE SERPL-SCNC: 107 MMOL/L (ref 98–107)
CO2 SERPL-SCNC: 24 MMOL/L (ref 22–30)
D DIMER PPP FEU-MCNC: 11.67 MG/L FEU (ref ?–0.6)
EOSINOPHIL # BLD AUTO: 0 K/UL (ref 0–0.7)
EOSINOPHIL NFR BLD AUTO: 0 %
ERYTHROCYTE [DISTWIDTH] IN BLOOD BY AUTOMATED COUNT: 4.09 M/UL (ref 4.3–5.9)
ERYTHROCYTE [DISTWIDTH] IN BLOOD: 12.9 % (ref 11.5–15.5)
FERRITIN SERPL-MCNC: 672.1 NG/ML (ref 22–322)
FIBRINOGEN PPP-MCNC: 489 MG/DL (ref 200–500)
GLUCOSE BLD-MCNC: 115 MG/DL (ref 75–99)
GLUCOSE BLD-MCNC: 129 MG/DL (ref 75–99)
GLUCOSE BLD-MCNC: 141 MG/DL (ref 75–99)
GLUCOSE BLD-MCNC: 163 MG/DL (ref 75–99)
GLUCOSE BLD-MCNC: 214 MG/DL (ref 75–99)
GLUCOSE BLD-MCNC: 258 MG/DL (ref 75–99)
GLUCOSE BLD-MCNC: 274 MG/DL (ref 75–99)
GLUCOSE BLD-MCNC: 313 MG/DL (ref 75–99)
GLUCOSE BLD-MCNC: 338 MG/DL (ref 75–99)
GLUCOSE SERPL-MCNC: 219 MG/DL (ref 74–99)
HCT VFR BLD AUTO: 38.5 % (ref 39–53)
HGB BLD-MCNC: 12.7 GM/DL (ref 13–17.5)
LDH SPEC-CCNC: 995 U/L (ref 313–618)
LYMPHOCYTES # SPEC AUTO: 0.5 K/UL (ref 1–4.8)
LYMPHOCYTES NFR SPEC AUTO: 4 %
MCH RBC QN AUTO: 31.1 PG (ref 25–35)
MCHC RBC AUTO-ENTMCNC: 33 G/DL (ref 31–37)
MCV RBC AUTO: 94.1 FL (ref 80–100)
MONOCYTES # BLD AUTO: 0.5 K/UL (ref 0–1)
MONOCYTES NFR BLD AUTO: 4 %
NEUTROPHILS # BLD AUTO: 12.2 K/UL (ref 1.3–7.7)
NEUTROPHILS NFR BLD AUTO: 92 %
PLATELET # BLD AUTO: 267 K/UL (ref 150–450)
POTASSIUM SERPL-SCNC: 4 MMOL/L (ref 3.5–5.1)
PROT SERPL-MCNC: 5.2 G/DL (ref 6.3–8.2)
SODIUM SERPL-SCNC: 134 MMOL/L (ref 137–145)
WBC # BLD AUTO: 13.3 K/UL (ref 3.8–10.6)

## 2020-12-14 RX ADMIN — AMIODARONE HYDROCHLORIDE SCH MG: 200 TABLET ORAL at 20:22

## 2020-12-14 RX ADMIN — INSULIN DETEMIR SCH UNIT: 100 INJECTION, SOLUTION SUBCUTANEOUS at 11:47

## 2020-12-14 RX ADMIN — OXYCODONE HYDROCHLORIDE AND ACETAMINOPHEN SCH MG: 500 TABLET ORAL at 08:29

## 2020-12-14 RX ADMIN — METOPROLOL TARTRATE SCH MG: 25 TABLET, FILM COATED ORAL at 08:33

## 2020-12-14 RX ADMIN — CEFAZOLIN SCH MLS/HR: 330 INJECTION, POWDER, FOR SOLUTION INTRAMUSCULAR; INTRAVENOUS at 07:00

## 2020-12-14 RX ADMIN — INSULIN ASPART SCH UNIT: 100 INJECTION, SOLUTION INTRAVENOUS; SUBCUTANEOUS at 07:03

## 2020-12-14 RX ADMIN — INSULIN HUMAN SCH MLS/HR: 100 INJECTION, SOLUTION PARENTERAL at 20:43

## 2020-12-14 RX ADMIN — DEXTROSE SCH MG: 50 INJECTION, SOLUTION INTRAVENOUS at 20:22

## 2020-12-14 RX ADMIN — METOPROLOL TARTRATE SCH MG: 25 TABLET, FILM COATED ORAL at 15:59

## 2020-12-14 RX ADMIN — RIVAROXABAN SCH MG: 20 TABLET, FILM COATED ORAL at 16:45

## 2020-12-14 RX ADMIN — METOPROLOL TARTRATE SCH MG: 25 TABLET, FILM COATED ORAL at 22:55

## 2020-12-14 RX ADMIN — PANTOPRAZOLE SODIUM SCH MG: 40 TABLET, DELAYED RELEASE ORAL at 08:29

## 2020-12-14 RX ADMIN — DILTIAZEM HYDROCHLORIDE SCH MLS/HR: 5 INJECTION INTRAVENOUS at 06:59

## 2020-12-14 RX ADMIN — DEXTROSE SCH MG: 50 INJECTION, SOLUTION INTRAVENOUS at 08:29

## 2020-12-14 RX ADMIN — CEFAZOLIN SCH MLS/HR: 330 INJECTION, POWDER, FOR SOLUTION INTRAMUSCULAR; INTRAVENOUS at 20:21

## 2020-12-14 RX ADMIN — AMIODARONE HYDROCHLORIDE SCH MG: 200 TABLET ORAL at 08:29

## 2020-12-14 RX ADMIN — INSULIN HUMAN SCH MLS/HR: 100 INJECTION, SOLUTION PARENTERAL at 17:16

## 2020-12-14 RX ADMIN — DILTIAZEM HYDROCHLORIDE SCH MLS/HR: 5 INJECTION INTRAVENOUS at 16:45

## 2020-12-14 RX ADMIN — Medication SCH MG: at 08:29

## 2020-12-14 RX ADMIN — INSULIN ASPART SCH UNIT: 100 INJECTION, SOLUTION INTRAVENOUS; SUBCUTANEOUS at 11:47

## 2020-12-14 NOTE — PN
PROGRESS NOTE



Mr. Chavez is a 70-year-old male patient seen by Dr. Walters who has a history of

ischemic heart disease, cardiac catheterization 6 years back, history of pulmonary

embolism and hypertension.  He follows with Dr. Howard.  He is here being treated

for COVID-19. His current issues are atrial fibrillation with RVR.  He is on Cardizem

drip as well as on oral Cardizem as well as oral amiodarone.  He received IV amiodarone

for his rates; still elevated.  He is on 10 mg of IV Cardizem.  His labs are reviewed.

White count 13,000.  Lymphocyte count low.  Neutrophil count elevated.  Sodium 134,

potassium 4.0, BUN 29, creatinine 1.05.  CRP 24, which is improving. His vitals are

reviewed.  Blood pressure 119/74 mmHg, afebrile 97.8 degrees Fahrenheit, pulse rate in

the 70s.  Respiratory rate 18 to 22.



IMPRESSION:

Atrial fibrillation with rapid ventricular response, currently on oral amiodarone and

beta blockers. He is also on IV Cardizem.  His beta blocker dose was just increased.

We will continue IV Cardizem at a high rate.  I recommend anticoagulation. He is

currently on rivaroxaban and rate control.





MMODL / IJN: [id number] / Job#: [id number]

## 2020-12-14 NOTE — P.PN
Subjective


Progress Note Date: 12/14/20





From Records


Mr. Chavez is a 70-year-old male,  who is a patient of Dr. Stovall with a past 

medical history of hypertension,MI coming in with a chief complaint of fatigue 

and generalized weakness that have been ongoing for past 1 week.  Patient states

that he has been having poor appetite loss of smell and loss of taste for the 

past 1 week.  He states that he has been sleeping for more than 20 hours a day. 

Patient denies having any cough or difficulty in breathing.  He denies having 

any chest pain or palpitations.  He denies having any dysuria or hematuria.  

Patient denies having any history of diabetes but his blood sugars are in 300s 

to 400s.  In the emergency room patient had a chest x-ray showing no acute 

cardiopulmonary process and an EKG showing normal sinus rhythm.  He had labs 

done showing white count of 3.9, hemoglobin 14.4, platelets 166.  Sodium 131, 

potassium 3.9, chloride.  BUN 15, creatinine 0.93 C-reactive protein 59, albumin

3.3 coronavirus PCR positive. 





On 12/6/2020 patient was seen and examined.  He is currently resting comfortably

in bed.  Appears to be no acute distress.  On reviewing the vitals patient 

continues to fever as high as 102.3.  His blood pressure has been stable around 

130s 80s and he saturating at 95% on room air.  Patient's blood sugars have been

running on the higher side.





On 12/07/2020- patient was seen and examined and the general medical floors.  He

is resting comfortably in bed.  Overnight active issues reported by nursing 

staff. Patient states that his difficulty in breathing is improving.  On 

reviewing the vitals patient's T-max is 98.4, heart rate 72, respiratory rate 

127-79 and saturating at 93% on room air.  On reviewing vitals patient's white 

count of 2.8, hemoglobin 14.4, platelets 189.  Sodium 136, potassium 4.6, 

chloride 102.  BUN 23 and creatinine of 1.0.  Blood sugars have been running 

high in 200s to 300s.  C-reactive protein 7.5.








12/08/2020


This is a pleasant 70 years old male who presents with cough with infection and 

found to have new onset diabetes with elevated hemoglobin A1c at 14%.


Patient currently with no respiratory symptoms, no chest pain or dyspnea 

coughing.  Yesterday he has a regular bowel movement but today he had 1 loose 

bowel movement but no significant abdominal pain or nausea vomiting.


Also he is running a fever of 101.  He saturating 90% at room air.


BMP and CBC were reviewed and they were unremarkable except for mild leukopenia 

2.8K.  D-dimer is negative at 0.34.  Coronavirus is detected.  Chest x-ray is 

normal


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  Normal saline at 75 mL/h was 

added.  Also Levemir 10 units daily and metformin 1000 mg twice daily has been 

added because his sugar still not controlled


Patient is able to eat % of his meal





12/9/20


Patient had a rough night because of fever, his breathing is quiet and stable 

and he's only on 2 L oxygen.  Is still have diarrhea about twice per day which 

is similar to the day before.  No abdominal pain or vomiting.  No significant 

coughing.  unstable


Chest x-ray: Worsening infiltrates, Pronecalcitonin is elevated at 0.27, 

patient is started on Rocephin and Zithromax.


He is new onset diabetes and also is on dexamethasone and his sugar more than 

300, so Levemir was decreased from 10-30 units daily.  Also he is on metformin 

1000 and Amaryl 1 mg.


Continue on Xarelto, normocephalic 75, remedisivr, dexamethasone 6 mg daily.





12/10/2020


Patient hadn't good night  because he could not sleep.  No respiratory symptoms,

ongoing generalized weakness.


Yesterday he has watery diarrhea but this morning he had small more formed bowel

movement.  No abdominal pain.


Persistent fever have subsided and his been afebrile for more than 24 hours.  We

will check Tylenol as needed


His sugar is better controlled after increasing his insulin to 30 units.


Antibiotics with ceftriaxone and Zithromax were admitted with high protei

ncalcitonin.  We will try to check sputum culture.


Check labs in the morning





12/11/2020


Patient feels much better today, his fever subsided and his on Tylenol as needed

currently.  No respiratory issue or symptom.  He had a little formed bowel 

movement today.  Currently patient remains on the same cocktail for comfort 

infection besides remedisivr


Hemodynamically stable.  Vitals are stable showing mild leukocytosis of 11.6 K





12/12/2020


pt is moved to select unit , pt developed a fib and RVR and he was started on 

Cardizem and amiodarone drip and is already on Xarelto. 


Also patient on Lopressor 75 mg 3 times a day Also he developed more hypoxic and

is currently on 50 L oxygen via nasal cannula saturating 90%.  


Repeat chest x-ray Moderate peripheral-based opacities throughout both lungs w

ith no pleural effusion.  Blood gas on BOTH HIS WITH PH 7.48, LOW PCO2 OF 22 AND

LOW PO2 AT 58 PATIENT HAS BEEN EVALUATED BY PULMONARY SERVICE AND PATIENT MAY 

NEED TO GO TO THE ICU AS WELL.


LEFT SHOWING STABLE LEUKOCYTOSIS AT 14.9 K, ELEVATED D-DIMER 3.1, BMP IS 

UNREMARKABLE AND WORSENING OF THE EDGE AND C-REACTIVE PROTEIN


Patient is kept on remedisivr, dexamethasone 6 mg daily.


However patient diarrhea has improved 








12/13/2020


Patient today kept in the ICU on BiPAP saturating 100% however he looks 

comfortable.  Distal short of breath and dyspneic


Rest of vitals are stable


Laps looks stable, inflammatory markers LDH and C-reactive protein still 

trending up slowly compared to yesterday.proCalcitonin is negative.  D-dimer is

trending up at 5.7


Chest x-ray showing stable bilateral lung infiltrates


He remains on Decadron and remdesivir


Also I kept on Cardizem drip at 20 mg an hour and amiodarone 400 mg twice daily 

and metoprolol 75 mg 3 times a day, vitamin C and A. fib and heart rate ranging 

between .


He has an echo from 07/22/2020 showed ejection fraction of 50-55%.  BNP is el

evated at 3940.


Discontinue ceftriaxone.  Keep Zithromax for now.  Patient to continue on 

Xarelto 


patient is nothing by mouth WE'LL KEEP HIM ON GENTLE HYDRATION AT 50 ML/H.  ALSO

GOT 1 DOSE OF LASIX





12/14/2020


Patient is seen and evaluated and follow-up continues to be closely monitored in

the ICU.  Patient has been taken off the BiPAP and placed on Airvo with an 

oxygen rate of 60 and FiO2 at 85% and is currently 91%.  To continue to wean as 

tolerated with the use of BiPAP as needed as well.  Patient is afebrile.  

Patient's d-dimer continues to be elevated and has increased at 11.67 and rodrigo

ent is maintained on Xarelto and will continue at this time.  Patient is eating 

today and tolerating with no reports of nausea or vomiting noted.  Multiple 

medical consultations following including cardiology.  Cardizem drip has been 

decreased to 10 and will continue to monitor this patient continues on 

amiodarone as well.  Heart rate currently in the 70s.  Blood sugars being 

controlled and will continue sliding scale along with long-acting at this time. 

Patient to continue with dexamethasone along with vitamin C and D and zinc 

supplements.  Patient is currently off antibiotics and will continue to monitor 

closely.  Chest x-ray today shows cardiomegaly with bilateral multifocal acute 

infiltrates greatest in the periphery, organizing consolidation in the lower 

lungs with no significant change. 








CONSTITUTIONAL: No fever, no malaise, no fatigue. 


HEENT: No recent visual problems or hearing problems. Denied any sore throat. 


CARDIOVASCULAR: No  orthopnea, PND, no palpitations, no syncope. 


PULMONARY: No shortness of breath, no cough, no hemoptysis. 


GASTROINTESTINAL: No diarrhea, no nausea, no vomiting, no abdominal pain. 

Normoactive bowel sounds. 


NEUROLOGICAL: No headaches, no weakness, no numbness. 





Objective





- Vital Signs


Vital signs: 


                                   Vital Signs











Temp  97.7 F   12/14/20 08:00


 


Pulse  66   12/14/20 10:00


 


Resp  26 H  12/14/20 10:00


 


BP  116/76   12/14/20 10:00


 


Pulse Ox  95   12/14/20 10:00








                                 Intake & Output











 12/13/20 12/14/20 12/14/20





 18:59 06:59 18:59


 


Intake Total 1200 975 526


 


Output Total 1900 650 


 


Balance -700 325 526


 


Weight 92.5 kg  


 


Intake:   


 


   550 200


 


    Sodium Chloride 0.9% 1, 650 550 200





    000 ml @ 50 mls/hr IV .   





    Q20H [facility] Rx#:[id number]   


 


  Intake, IV Titration 550 125 76





  Amount   


 


    Azithromycin 500 mg In 250  





    Sodium Chloride 0.9% 250   





    ml @ 250 mls/hr IVPB   





    DAILY@1800 STE[facility] Rx#:   





    [id number]   


 


    Diltiazem 125 mg In 250 125 76





    Sodium Chloride 0.9% 100   





    ml @ 10 MG/HR 10 mls/hr   





    IV .[id number] [facility] Rx#:   





    [id number]   


 


    cefTRIAXone 1 gm In 50  





    Sodium Chloride 0.9% 50   





    ml @ 100 mls/hr IVPB   





    Q24HR [facility] Rx#:[id number]   


 


  Oral  300 250


 


Output:   


 


  Urine 1900 650 


 


Other:   


 


  Voiding Method Urinal Urinal Urinal


 


  # Voids  1 














- Exam





GENERAL: The patient is alert and oriented x3, not in any acute distress. Well 

developed, well nourished.  Presently on an airvo with intermittent BiPAP


HEENT: Pupils are round and equally reacting to light. EOMI. No scleral icterus.

No conjunctival pallor. Normocephalic, atraumatic. No pharyngeal erythema. No 

thyromegaly. 


CARDIOVASCULAR: S1 and S2 present. No murmurs, rubs, or gallops. 


PULMONARY: Diminished breath sounds bilaterally with a few scattered crackles 

noted


ABDOMEN: Soft, nontender, nondistended, normoactive bowel sounds. No palpable 

organomegaly. 


MUSCULOSKELETAL: No joint swelling or deformity. 


EXTREMITIES: No cyanosis, clubbing, or pedal edema. 


NEUROLOGICAL: Gross neurological examination did not reveal any focal deficits. 


SKIN: No rashes. no petechiae.





- Labs


CBC & Chem 7: 


                                 12/14/20 03:54





                                 12/14/20 03:54


Labs: 


                  Abnormal Lab Results - Last 24 Hours (Table)











  12/13/20 12/13/20 12/13/20 Range/Units





  11:38 17:11 21:56 


 


WBC     (3.8-10.6)  k/uL


 


RBC     (4.30-5.90)  m/uL


 


Hgb     (13.0-17.5)  gm/dL


 


Hct     (39.0-53.0)  %


 


Neutrophils #     (1.3-7.7)  k/uL


 


Lymphocytes #     (1.0-4.8)  k/uL


 


D-Dimer     (<0.60)  mg/L FEU


 


Sodium     (137-145)  mmol/L


 


BUN     (9-20)  mg/dL


 


Glucose     (74-99)  mg/dL


 


POC Glucose (mg/dL)  171 H  205 H  220 H  (75-99)  mg/dL


 


Calcium     (8.4-10.2)  mg/dL


 


Ferritin     (22.0-322.0)  ng/mL


 


Lactate Dehydrogenase     (313-618)  U/L


 


C-Reactive Protein     (<10.0)  mg/L


 


Total Protein     (6.3-8.2)  g/dL


 


Albumin     (3.5-5.0)  g/dL














  12/14/20 12/14/20 12/14/20 Range/Units





  03:54 03:54 03:54 


 


WBC  13.3 H    (3.8-10.6)  k/uL


 


RBC  4.09 L    (4.30-5.90)  m/uL


 


Hgb  12.7 L    (13.0-17.5)  gm/dL


 


Hct  38.5 L    (39.0-53.0)  %


 


Neutrophils #  12.2 H    (1.3-7.7)  k/uL


 


Lymphocytes #  0.5 L    (1.0-4.8)  k/uL


 


D-Dimer   11.67 H   (<0.60)  mg/L FEU


 


Sodium    134 L  (137-145)  mmol/L


 


BUN    29 H  (9-20)  mg/dL


 


Glucose    219 H  (74-99)  mg/dL


 


POC Glucose (mg/dL)     (75-99)  mg/dL


 


Calcium    7.8 L  (8.4-10.2)  mg/dL


 


Ferritin    672.1 H  (22.0-322.0)  ng/mL


 


Lactate Dehydrogenase    995 H  (313-618)  U/L


 


C-Reactive Protein    24.2 H  (<10.0)  mg/L


 


Total Protein    5.2 L  (6.3-8.2)  g/dL


 


Albumin    2.6 L  (3.5-5.0)  g/dL














  12/14/20 Range/Units





  07:00 


 


WBC   (3.8-10.6)  k/uL


 


RBC   (4.30-5.90)  m/uL


 


Hgb   (13.0-17.5)  gm/dL


 


Hct   (39.0-53.0)  %


 


Neutrophils #   (1.3-7.7)  k/uL


 


Lymphocytes #   (1.0-4.8)  k/uL


 


D-Dimer   (<0.60)  mg/L FEU


 


Sodium   (137-145)  mmol/L


 


BUN   (9-20)  mg/dL


 


Glucose   (74-99)  mg/dL


 


POC Glucose (mg/dL)  214 H  (75-99)  mg/dL


 


Calcium   (8.4-10.2)  mg/dL


 


Ferritin   (22.0-322.0)  ng/mL


 


Lactate Dehydrogenase   (313-618)  U/L


 


C-Reactive Protein   (<10.0)  mg/L


 


Total Protein   (6.3-8.2)  g/dL


 


Albumin   (3.5-5.0)  g/dL














Assessment and Plan


Assessment: 





Worsening pneumonia


Worsening acute hypoxic respiratory failure


A. fib with RVR


Fatigue generalized weakness secondary to Covid infection


New-onset diabetes


Hyponatremia due to dehydration


Dehydration


Gastroenteritis secondary to covid infection


Hypomagnesemia


Hypertension


History of MI


DVT prophylaxis: Xarelto


GI prophylaxis: Pepcid


Full code





Plan: 





This is a pleasant 70 years old male who presents with Covid 19 infection with 

pneumonia, mild gastroenteritis and new diabetes.  Also A. fib with RVR  

Continue with zinc and ascorbic acid, continue with gentle hydration.  Continue 

with sliding scale and also he is on Levemir 5 units daily.  Patient is 

currently eating consistent carb and tolerating thus far.  Antibiotics have been

discontinued.  Patient continues on Cardizem drip which is being titrated to 10 

mL per hour and will continue to monitor patient continues on oral amiodarone.  

Cardiology is following along with multiple medical consultations.  Patient was 

on a BiPAP and presently on Airvo and tolerating thus far.  Will continue to 

monitor closely and continue BiPAP as needed.


Pulmonary consult and continue with oxygen to keep saturation more than 90%.  

Cardiology input is appreciated, continue with Cardizem and amiodarone, 

Lopressor and Xarelto


Labs, today's chest x-ray, and medication were reviewed..  Continue same 

treatment.  Continue with symptomatic treatment.  Resume home medication.  

Monitor lytes and vitals.  DVT and GI prophylaxis.  Further recommendationsas 

per clinical course of the patient





Prognosis is guarded





Time with Patient: Greater than 30

## 2020-12-14 NOTE — XR
EXAMINATION TYPE: XR chest 1V portable

 

DATE OF EXAM: 12/14/2020

 

CLINICAL HISTORY: Difficulty breathing progress study. Covid pneumonia. 

 

TECHNIQUE: Single AP portable upright view of the chest is obtained.

 

COMPARISON: Chest x-ray from one day earlier and older studies. Chest CT October 23, 2020

 

FINDINGS:  Background chronic emphysematous change with multifocal bilateral predominantly peripheral
 opacities remains present. Organizing consolidation lower lungs noted. Cardiac silhouette size is en
larged currently. Osseous structures remain intact.

 

IMPRESSION: Cardiomegaly with bilateral multifocal acute infiltrates greatest in the periphery, organ
izing consolidations in the lower lungs noted. No significant change from most recent x-ray. Findings
 consistent with covid 19 infection progression.

## 2020-12-14 NOTE — P.PN
Subjective


Progress Note Date: 12/14/20


Principal diagnosis: 





Acute hypoxic respiratory failure secondary to covid 19 pneumonia.


On 12/13/2020, the patient got transferred to the intensive care unit overnight.

 I saw him in a medical floor and the patient was having acute hypoxic 

respiratory failure secondary to coronavirus/Covid 19 related pneumonia.  The 

patient was in the 100%.  After arriving to the intensive care unit, the patient

had to be placed on a BiPAP which is currently running at a pressure of 12/6 cm 

of water with an FiO2 of 20%.  His pulse ox currently is 96%.  Blood gases are 

pending from this morning.  He is resting comfortably on the BiPAP and he is 

able to tolerated without any major difficulties.  His cardiac rhythm has slowed

down.  He remains on atrial fibrillation.  He is on a Cardizem drip running at 

20 mg an hour.  He is also on metoprolol at a dose of 75 mg by mouth three a day

and amiodarone 400 mg by mouth twice a day.  Patient is on Xarelto.  D-dimer is 

elevated which is consistent with his coronavirus/Covid 19 related infection.  

His white cell count of 13.5.  His LDH level is at 904.  The proBNP level was 

3940 and his troponin was at 0.142.  I gave him a dose of Lasix yesterday.  His 

fluid balance over the past 24 hours has been -996 mL.  The chest x-ray still 

showing diffuse bilateral pulmonary infiltrates which is essentially unchanged 

compared to yesterday.  There are diffuse peripheral infiltrates bilaterally.





Reevaluated today on 12/14/20, patient is on BiPAP 12/6, FiO2 is 85%, patient 

seems to be comfortable, he is not in distress, asking to be fed, hence I will 

switch the patient to airvo, and continue to titrate accordingly to maintain O2 

saturation above 90% possible.  Patient remains on Cardizem drip for his atrial 

fibrillation, his rate is controlled, his IV fluid is a 0.9 normal saline.  CBC 

is relatively normal.  Inflammatory markers are elevated, LDH is 995 and his C-

reactive protein is 24.2.


*Normal renal profile is normal chest x-ray is quite abnormal showing diffuse 

interstitial infiltrates bilaterally.











Objective





- Vital Signs


Vital signs: 


                                   Vital Signs











Temp  97.8 F   12/14/20 12:00


 


Pulse  70   12/14/20 12:00


 


Resp  14   12/14/20 12:00


 


BP  119/74   12/14/20 12:00


 


Pulse Ox  91 L  12/14/20 12:00








                                 Intake & Output











 12/13/20 12/14/20 12/14/20





 18:59 06:59 18:59


 


Intake Total 1200 975 926


 


Output Total 1900 650 


 


Balance -700 325 926


 


Weight 92.5 kg  


 


Intake:   


 


   550 300


 


    Sodium Chloride 0.9% 1, 650 550 300





    000 ml @ 50 mls/hr IV .   





    Q20H [facility] Rx#:[id number]   


 


  Intake, IV Titration 550 125 76





  Amount   


 


    Azithromycin 500 mg In 250  





    Sodium Chloride 0.9% 250   





    ml @ 250 mls/hr IVPB   





    DAILY@1800 [facility] Rx#:   





    [id number]   


 


    Diltiazem 125 mg In 250 125 76





    Sodium Chloride 0.9% 100   





    ml @ 10 MG/HR 10 mls/hr   





    IV .[id number] [facility] Rx#:   





    [id number]   


 


    cefTRIAXone 1 gm In 50  





    Sodium Chloride 0.9% 50   





    ml @ 100 mls/hr IVPB   





    Q24HR [facility] Rx#:[id number]   


 


  Oral  300 550


 


Output:   


 


  Urine 1900 650 


 


Other:   


 


  Voiding Method Urinal Urinal Urinal


 


  # Voids  1 














- Exam


GENERAL: The patient is alert and oriented x3, on BiPAP, comfortable


HEENT: Pupils are round and equally reacting to light. EOMI. No scleral icterus.

No conjunctival pallor. Normocephalic, atraumatic. No pharyngeal erythema. No 

thyromegaly. 


CARDIOVASCULAR: S1 and S2 present. No murmurs, rubs, or gallops. 


PULMONARY: Crackles at the bases, no rhonchi and no wheezes.


ABDOMEN: Soft, nontender, nondistended, normoactive bowel sounds. No palpable 

organomegaly. 


MUSCULOSKELETAL: No joint swelling or deformity. 


EXTREMITIES: No clubbing edema or cyanosis


NEUROLOGICAL: Alert and oriented 3, no gross focal deficits


SKIN: No rashes. no petechiae.











- Labs


CBC & Chem 7: 


                                 12/14/20 03:54





                                 12/14/20 03:54


Labs: 


                  Abnormal Lab Results - Last 24 Hours (Table)











  12/13/20 12/13/20 12/14/20 Range/Units





  17:11 21:56 03:54 


 


WBC    13.3 H  (3.8-10.6)  k/uL


 


RBC    4.09 L  (4.30-5.90)  m/uL


 


Hgb    12.7 L  (13.0-17.5)  gm/dL


 


Hct    38.5 L  (39.0-53.0)  %


 


Neutrophils #    12.2 H  (1.3-7.7)  k/uL


 


Lymphocytes #    0.5 L  (1.0-4.8)  k/uL


 


D-Dimer     (<0.60)  mg/L FEU


 


Sodium     (137-145)  mmol/L


 


BUN     (9-20)  mg/dL


 


Glucose     (74-99)  mg/dL


 


POC Glucose (mg/dL)  205 H  220 H   (75-99)  mg/dL


 


Calcium     (8.4-10.2)  mg/dL


 


Ferritin     (22.0-322.0)  ng/mL


 


Lactate Dehydrogenase     (313-618)  U/L


 


C-Reactive Protein     (<10.0)  mg/L


 


Total Protein     (6.3-8.2)  g/dL


 


Albumin     (3.5-5.0)  g/dL














  12/14/20 12/14/20 12/14/20 Range/Units





  03:54 03:54 07:00 


 


WBC     (3.8-10.6)  k/uL


 


RBC     (4.30-5.90)  m/uL


 


Hgb     (13.0-17.5)  gm/dL


 


Hct     (39.0-53.0)  %


 


Neutrophils #     (1.3-7.7)  k/uL


 


Lymphocytes #     (1.0-4.8)  k/uL


 


D-Dimer  11.67 H    (<0.60)  mg/L FEU


 


Sodium   134 L   (137-145)  mmol/L


 


BUN   29 H   (9-20)  mg/dL


 


Glucose   219 H   (74-99)  mg/dL


 


POC Glucose (mg/dL)    214 H  (75-99)  mg/dL


 


Calcium   7.8 L   (8.4-10.2)  mg/dL


 


Ferritin   672.1 H   (22.0-322.0)  ng/mL


 


Lactate Dehydrogenase   995 H   (313-618)  U/L


 


C-Reactive Protein   24.2 H   (<10.0)  mg/L


 


Total Protein   5.2 L   (6.3-8.2)  g/dL


 


Albumin   2.6 L   (3.5-5.0)  g/dL














  12/14/20 Range/Units





  11:41 


 


WBC   (3.8-10.6)  k/uL


 


RBC   (4.30-5.90)  m/uL


 


Hgb   (13.0-17.5)  gm/dL


 


Hct   (39.0-53.0)  %


 


Neutrophils #   (1.3-7.7)  k/uL


 


Lymphocytes #   (1.0-4.8)  k/uL


 


D-Dimer   (<0.60)  mg/L FEU


 


Sodium   (137-145)  mmol/L


 


BUN   (9-20)  mg/dL


 


Glucose   (74-99)  mg/dL


 


POC Glucose (mg/dL)  258 H  (75-99)  mg/dL


 


Calcium   (8.4-10.2)  mg/dL


 


Ferritin   (22.0-322.0)  ng/mL


 


Lactate Dehydrogenase   (313-618)  U/L


 


C-Reactive Protein   (<10.0)  mg/L


 


Total Protein   (6.3-8.2)  g/dL


 


Albumin   (3.5-5.0)  g/dL














Assessment and Plan


Assessment: 





Impression:


Acute hypoxic or short failure


Acute covid 19 pneumonitis.


New onset Atrial fibrillation with RVR.


New-onset diabetes.


Acute dehydration on presentation.


Acute gastroenteritis secondary to Covid infection


Benign essential hypertension.


History of underlying coronary artery disease.


Right upper lobe pulmonary embolism, patient is on Xarelto.


Troponin leak.


History of gout.


History of right upper lobe nodule to be monitored on outpatient basis.





Recommendation:


Continue Decadron.


Finished from this severe.


Continue BiPAP, titrate FiO2 accordingly.


Continue diuretics.


Continue Cardizem for atrial fibrillation and RVR.


Continue oral beta blockers.


Continue Xarelto.


Continue GI and DVT prophylaxis.


Continue to monitor in the ICU, I have a feeling that the patient may 

potentially deteriorate and require intubation and mechanical ventilation


Prognosis is definitely poor and guarded, and his chest x-ray findings are very 

concerning.


Critical care time is over 30 minutes.





Time with Patient: Greater than 30

## 2020-12-14 NOTE — PN
PROGRESS NOTE



DATE OF SERVICE:

12/14/2020



REASON FOR FOLLOWUP:

COVID-19 infection.



INTERVAL HISTORY:

The patient is currently afebrile.  The patient is breathing slightly comfortably;

however, he remains _____ denies having any chest pain.  Minimal cough.  No abdominal

pain or diarrhea.



PHYSICAL EXAMINATION:

Blood pressure 116/60 with a pulse of 52, temperature 97.4.  He is 93% on 60% FiO2.

General description is an elderly male lying in bed in no distress.

RESPIRATORY SYSTEM: Unlabored breathing with decreased intensity of breath sounds. No

wheeze.

HEART: S1, S2.  Regular rate and rhythm.

ABDOMEN: Soft. No tenderness.



LABS:

Hemoglobin is 12.6, white count 13.3, BUN of 29, creatinine 1.05. Electrolytes have

been normal.  Liver enzymes are normal.



DIAGNOSTIC IMPRESSION AND PLAN:

Patient with acute COVID-19 infection in this patient who did have worsening of the

respiratory status and has been in the ICU with a possible component of _____ in this

patient who completed his 5-day course of remdesivir, currently on dexamethasone,

Xarelto and zinc; to continue. Monitor his clinical course closely.





MMODL / IJN: [id number] / Job#: [id number]

## 2020-12-15 LAB
ALBUMIN SERPL-MCNC: 2.6 G/DL (ref 3.5–5)
ALP SERPL-CCNC: 90 U/L (ref 38–126)
ALT SERPL-CCNC: 71 U/L (ref 4–49)
ANION GAP SERPL CALC-SCNC: 4 MMOL/L
AST SERPL-CCNC: 58 U/L (ref 17–59)
BASOPHILS # BLD AUTO: 0.1 K/UL (ref 0–0.2)
BASOPHILS NFR BLD AUTO: 1 %
BUN SERPL-SCNC: 27 MG/DL (ref 9–20)
CALCIUM SPEC-MCNC: 8 MG/DL (ref 8.4–10.2)
CHLORIDE SERPL-SCNC: 108 MMOL/L (ref 98–107)
CO2 SERPL-SCNC: 23 MMOL/L (ref 22–30)
EOSINOPHIL # BLD AUTO: 0.1 K/UL (ref 0–0.7)
EOSINOPHIL NFR BLD AUTO: 1 %
ERYTHROCYTE [DISTWIDTH] IN BLOOD BY AUTOMATED COUNT: 4.35 M/UL (ref 4.3–5.9)
ERYTHROCYTE [DISTWIDTH] IN BLOOD: 13 % (ref 11.5–15.5)
GLUCOSE BLD-MCNC: 137 MG/DL (ref 75–99)
GLUCOSE BLD-MCNC: 141 MG/DL (ref 75–99)
GLUCOSE BLD-MCNC: 145 MG/DL (ref 75–99)
GLUCOSE BLD-MCNC: 148 MG/DL (ref 75–99)
GLUCOSE BLD-MCNC: 157 MG/DL (ref 75–99)
GLUCOSE BLD-MCNC: 162 MG/DL (ref 75–99)
GLUCOSE BLD-MCNC: 178 MG/DL (ref 75–99)
GLUCOSE BLD-MCNC: 185 MG/DL (ref 75–99)
GLUCOSE BLD-MCNC: 185 MG/DL (ref 75–99)
GLUCOSE BLD-MCNC: 196 MG/DL (ref 75–99)
GLUCOSE BLD-MCNC: 211 MG/DL (ref 75–99)
GLUCOSE BLD-MCNC: 280 MG/DL (ref 75–99)
GLUCOSE BLD-MCNC: 291 MG/DL (ref 75–99)
GLUCOSE SERPL-MCNC: 150 MG/DL (ref 74–99)
HCT VFR BLD AUTO: 40.6 % (ref 39–53)
HGB BLD-MCNC: 13.4 GM/DL (ref 13–17.5)
LYMPHOCYTES # SPEC AUTO: 0.4 K/UL (ref 1–4.8)
LYMPHOCYTES NFR SPEC AUTO: 2 %
MCH RBC QN AUTO: 30.8 PG (ref 25–35)
MCHC RBC AUTO-ENTMCNC: 33 G/DL (ref 31–37)
MCV RBC AUTO: 93.3 FL (ref 80–100)
MONOCYTES # BLD AUTO: 0.4 K/UL (ref 0–1)
MONOCYTES NFR BLD AUTO: 3 %
NEUTROPHILS # BLD AUTO: 14.8 K/UL (ref 1.3–7.7)
NEUTROPHILS NFR BLD AUTO: 93 %
PLATELET # BLD AUTO: 231 K/UL (ref 150–450)
POTASSIUM SERPL-SCNC: 4.3 MMOL/L (ref 3.5–5.1)
PROT SERPL-MCNC: 5.4 G/DL (ref 6.3–8.2)
SODIUM SERPL-SCNC: 135 MMOL/L (ref 137–145)
WBC # BLD AUTO: 15.9 K/UL (ref 3.8–10.6)

## 2020-12-15 RX ADMIN — DEXTROSE SCH MG: 50 INJECTION, SOLUTION INTRAVENOUS at 21:39

## 2020-12-15 RX ADMIN — INSULIN ASPART SCH: 100 INJECTION, SOLUTION INTRAVENOUS; SUBCUTANEOUS at 16:52

## 2020-12-15 RX ADMIN — INSULIN ASPART SCH UNIT: 100 INJECTION, SOLUTION INTRAVENOUS; SUBCUTANEOUS at 23:58

## 2020-12-15 RX ADMIN — RIVAROXABAN SCH MG: 20 TABLET, FILM COATED ORAL at 16:08

## 2020-12-15 RX ADMIN — DEXTROSE SCH MG: 50 INJECTION, SOLUTION INTRAVENOUS at 08:58

## 2020-12-15 RX ADMIN — RIVAROXABAN SCH MG: 20 TABLET, FILM COATED ORAL at 16:09

## 2020-12-15 RX ADMIN — INSULIN DETEMIR SCH UNIT: 100 INJECTION, SOLUTION SUBCUTANEOUS at 08:57

## 2020-12-15 RX ADMIN — AMIODARONE HYDROCHLORIDE SCH MG: 200 TABLET ORAL at 08:58

## 2020-12-15 RX ADMIN — METOPROLOL TARTRATE SCH MG: 25 TABLET, FILM COATED ORAL at 21:39

## 2020-12-15 RX ADMIN — OXYCODONE HYDROCHLORIDE AND ACETAMINOPHEN SCH MG: 500 TABLET ORAL at 08:58

## 2020-12-15 RX ADMIN — METOPROLOL TARTRATE SCH MG: 25 TABLET, FILM COATED ORAL at 16:00

## 2020-12-15 RX ADMIN — AMIODARONE HYDROCHLORIDE SCH MG: 200 TABLET ORAL at 21:39

## 2020-12-15 RX ADMIN — Medication SCH MG: at 08:59

## 2020-12-15 RX ADMIN — INSULIN ASPART SCH UNIT: 100 INJECTION, SOLUTION INTRAVENOUS; SUBCUTANEOUS at 21:38

## 2020-12-15 RX ADMIN — METOPROLOL TARTRATE SCH MG: 25 TABLET, FILM COATED ORAL at 08:59

## 2020-12-15 RX ADMIN — PANTOPRAZOLE SODIUM SCH MG: 40 TABLET, DELAYED RELEASE ORAL at 08:59

## 2020-12-15 NOTE — XR
EXAMINATION TYPE: XR chest 1V portable

 

DATE OF EXAM: 12/15/2020

 

CLINICAL HISTORY: Difficulty breathing and covid 19 pneumonia progress study.  

 

TECHNIQUE: Single AP portable semiupright view of the chest is obtained.

 

COMPARISON: Chest x-ray from one day earlier and older studies.

 

FINDINGS:  Background chronic emphysematous change with multifocal bilateral predominantly peripheral
 opacities remains present. Organizing consolidation lower lungs noted and increasing in prominence i
n the right lung base. Cardiac silhouette size is stable and enlarged. Multilevel spurring in thoraci
c spine redemonstrated.

 

IMPRESSION: Cardiomegaly with bilateral multifocal acute infiltrates greatest in the periphery, organ
izing consolidations in the lower lungs noted. Worsening findings right lung base noted from one day 
earlier. Findings consistent with covid 19 infection progression.

## 2020-12-15 NOTE — P.PN
Subjective


Progress Note Date: 12/15/20





From Records


Mr. Chavez is a 70-year-old male,  who is a patient of Dr. Stovall with a past 

medical history of hypertension,MI coming in with a chief complaint of fatigue 

and generalized weakness that have been ongoing for past 1 week.  Patient states

that he has been having poor appetite loss of smell and loss of taste for the 

past 1 week.  He states that he has been sleeping for more than 20 hours a day. 

Patient denies having any cough or difficulty in breathing.  He denies having 

any chest pain or palpitations.  He denies having any dysuria or hematuria.  

Patient denies having any history of diabetes but his blood sugars are in 300s 

to 400s.  In the emergency room patient had a chest x-ray showing no acute 

cardiopulmonary process and an EKG showing normal sinus rhythm.  He had labs 

done showing white count of 3.9, hemoglobin 14.4, platelets 166.  Sodium 131, 

potassium 3.9, chloride.  BUN 15, creatinine 0.93 C-reactive protein 59, albumin

3.3 coronavirus PCR positive. 





On 12/6/2020 patient was seen and examined.  He is currently resting comfortably

in bed.  Appears to be no acute distress.  On reviewing the vitals patient 

continues to fever as high as 102.3.  His blood pressure has been stable around 

130s 80s and he saturating at 95% on room air.  Patient's blood sugars have been

running on the higher side.





On 12/07/2020- patient was seen and examined and the general medical floors.  He

is resting comfortably in bed.  Overnight active issues reported by nursing 

staff. Patient states that his difficulty in breathing is improving.  On 

reviewing the vitals patient's T-max is 98.4, heart rate 72, respiratory rate 

127-79 and saturating at 93% on room air.  On reviewing vitals patient's white 

count of 2.8, hemoglobin 14.4, platelets 189.  Sodium 136, potassium 4.6, 

chloride 102.  BUN 23 and creatinine of 1.0.  Blood sugars have been running 

high in 200s to 300s.  C-reactive protein 7.5.








12/08/2020


This is a pleasant 70 years old male who presents with cough with infection and 

found to have new onset diabetes with elevated hemoglobin A1c at 14%.


Patient currently with no respiratory symptoms, no chest pain or dyspnea 

coughing.  Yesterday he has a regular bowel movement but today he had 1 loose 

bowel movement but no significant abdominal pain or nausea vomiting.


Also he is running a fever of 101.  He saturating 90% at room air.


BMP and CBC were reviewed and they were unremarkable except for mild leukopenia 

2.8K.  D-dimer is negative at 0.34.  Coronavirus is detected.  Chest x-ray is 

normal


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  Normal saline at 75 mL/h was 

added.  Also Levemir 10 units daily and metformin 1000 mg twice daily has been 

added because his sugar still not controlled


Patient is able to eat % of his meal





12/9/20


Patient had a rough night because of fever, his breathing is quiet and stable 

and he's only on 2 L oxygen.  Is still have diarrhea about twice per day which 

is similar to the day before.  No abdominal pain or vomiting.  No significant 

coughing.  unstable


Chest x-ray: Worsening infiltrates, Pronecalcitonin is elevated at 0.27, 

patient is started on Rocephin and Zithromax.


He is new onset diabetes and also is on dexamethasone and his sugar more than 

300, so Levemir was decreased from 10-30 units daily.  Also he is on metformin 

1000 and Amaryl 1 mg.


Continue on Xarelto, normocephalic 75, remedisivr, dexamethasone 6 mg daily.





12/10/2020


Patient hadn't good night  because he could not sleep.  No respiratory symptoms,

ongoing generalized weakness.


Yesterday he has watery diarrhea but this morning he had small more formed bowel

movement.  No abdominal pain.


Persistent fever have subsided and his been afebrile for more than 24 hours.  We

will check Tylenol as needed


His sugar is better controlled after increasing his insulin to 30 units.


Antibiotics with ceftriaxone and Zithromax were admitted with high protei

ncalcitonin.  We will try to check sputum culture.


Check labs in the morning





12/11/2020


Patient feels much better today, his fever subsided and his on Tylenol as needed

currently.  No respiratory issue or symptom.  He had a little formed bowel 

movement today.  Currently patient remains on the same cocktail for comfort 

infection besides remedisivr


Hemodynamically stable.  Vitals are stable showing mild leukocytosis of 11.6 K





12/12/2020


pt is moved to select unit , pt developed a fib and RVR and he was started on 

Cardizem and amiodarone drip and is already on Xarelto. 


Also patient on Lopressor 75 mg 3 times a day Also he developed more hypoxic and

is currently on 50 L oxygen via nasal cannula saturating 90%.  


Repeat chest x-ray Moderate peripheral-based opacities throughout both lungs w

ith no pleural effusion.  Blood gas on BOTH HIS WITH PH 7.48, LOW PCO2 OF 22 AND

LOW PO2 AT 58 PATIENT HAS BEEN EVALUATED BY PULMONARY SERVICE AND PATIENT MAY 

NEED TO GO TO THE ICU AS WELL.


LEFT SHOWING STABLE LEUKOCYTOSIS AT 14.9 K, ELEVATED D-DIMER 3.1, BMP IS 

UNREMARKABLE AND WORSENING OF THE EDGE AND C-REACTIVE PROTEIN


Patient is kept on remedisivr, dexamethasone 6 mg daily.


However patient diarrhea has improved 








12/13/2020


Patient today kept in the ICU on BiPAP saturating 100% however he looks 

comfortable.  Distal short of breath and dyspneic


Rest of vitals are stable


Laps looks stable, inflammatory markers LDH and C-reactive protein still 

trending up slowly compared to yesterday.proCalcitonin is negative.  D-dimer is

trending up at 5.7


Chest x-ray showing stable bilateral lung infiltrates


He remains on Decadron and remdesivir


Also I kept on Cardizem drip at 20 mg an hour and amiodarone 400 mg twice daily 

and metoprolol 75 mg 3 times a day, vitamin C and A. fib and heart rate ranging 

between .


He has an echo from 07/22/2020 showed ejection fraction of 50-55%.  BNP is el

evated at 3940.


Discontinue ceftriaxone.  Keep Zithromax for now.  Patient to continue on 

Xarelto 


patient is nothing by mouth WE'LL KEEP HIM ON GENTLE HYDRATION AT 50 ML/H.  ALSO

GOT 1 DOSE OF LASIX





12/14/2020


Patient is seen and evaluated and follow-up continues to be closely monitored in

the ICU.  Patient has been taken off the BiPAP and placed on Airvo with an 

oxygen rate of 60 and FiO2 at 85% and is currently 91%.  To continue to wean as 

tolerated with the use of BiPAP as needed as well.  Patient is afebrile.  

Patient's d-dimer continues to be elevated and has increased at 11.67 and rodrigo

ent is maintained on Xarelto and will continue at this time.  Patient is eating 

today and tolerating with no reports of nausea or vomiting noted.  Multiple 

medical consultations following including cardiology.  Cardizem drip has been 

decreased to 10 and will continue to monitor this patient continues on 

amiodarone as well.  Heart rate currently in the 70s.  Blood sugars being 

controlled and will continue sliding scale along with long-acting at this time. 

Patient to continue with dexamethasone along with vitamin C and D and zinc 

supplements.  Patient is currently off antibiotics and will continue to monitor 

closely.  Chest x-ray today shows cardiomegaly with bilateral multifocal acute 

infiltrates greatest in the periphery, organizing consolidation in the lower 

lungs with no significant change. 





12/15/2020


Patient seen in follow-up continues to be closely monitored in the ICU.  Patient

is maintained on BiPAP at night and transitioning to Airvo during the day.  

Patient was placed on insulin drip and will continue to monitor Accu-Cheks 

closely.  Patient continues to be severely dyspneic with any exertion and is 89-

90% with an O2 flow of 60 and FiO2 of 85.  IV Cardizem drip has been d

iscontinued and patient is maintained on amiodarone oral and will continue at 

this time.  Anticoagulation of Xarelto continues.  Chest x-rays continue to show

worsening and consistent with Covid 19 infection progression. 








CONSTITUTIONAL: No fever, no malaise, no fatigue. 


HEENT: No recent visual problems or hearing problems. Denied any sore throat. 


CARDIOVASCULAR: No  orthopnea, PND, no palpitations, no syncope. 


PULMONARY: reports continued shortness of breath, no cough, no hemoptysis. 


GASTROINTESTINAL: No diarrhea, no nausea, no vomiting, no abdominal pain. 

Normoactive bowel sounds. 


NEUROLOGICAL: No headaches, reports weakness, no numbness. 





Objective





- Vital Signs


Vital signs: 


                                   Vital Signs











Temp  98.6 F   12/15/20 08:00


 


Pulse  71   12/15/20 09:00


 


Resp  22   12/15/20 09:00


 


BP  137/86   12/15/20 09:00


 


Pulse Ox  92 L  12/15/20 09:00








                                 Intake & Output











 12/14/20 12/15/20 12/15/20





 18:59 06:59 18:59


 


Intake Total 1855.841 729.458 261.144


 


Output Total 700 1700 


 


Balance 1155.841 -970.542 261.144


 


Intake:   


 


   600 150


 


    Sodium Chloride 0.9% 1, 600 600 150





    000 ml @ 50 mls/hr IV .   





    Q20H [facility] Rx#:[id number]   


 


  Intake, IV Titration 155.841 29.458 11.144





  Amount   


 


    Diltiazem 125 mg In 126.5  





    Sodium Chloride 0.9% 100   





    ml @ 10 MG/HR 10 mls/hr   





    IV .[id number] [facility] Rx#:   





    [id number]   


 


    Insulin Regular 100 unit 29.341 29.458 11.144





    In Sodium Chloride 0.9%   





    100 ml @ Per Protocol IV   





    .Q0M [facility] Rx#:[id number]   


 


  Oral 1100 100 100


 


Output:   


 


  Urine 700 1700 


 


Other:   


 


  Voiding Method Urinal Urinal Urinal


 


  # Voids  1 














- Exam





GENERAL: The patient is alert and oriented x3, not in any acute distress. Well 

developed, well nourished.  Presently on an airvo with intermittent BiPAP


HEENT: Pupils are round and equally reacting to light. EOMI. No scleral icterus.

No conjunctival pallor. Normocephalic, atraumatic. No pharyngeal erythema. No 

thyromegaly. 


CARDIOVASCULAR: S1 and S2 present. No murmurs, rubs, or gallops. 


PULMONARY: Diminished breath sounds bilaterally with a few scattered crackles 

noted


ABDOMEN: Soft, nontender, nondistended, normoactive bowel sounds. No palpable 

organomegaly. 


MUSCULOSKELETAL: No joint swelling or deformity. 


EXTREMITIES: No cyanosis, clubbing, or pedal edema. 


NEUROLOGICAL: Gross neurological examination did not reveal any focal deficits. 


SKIN: No rashes. no petechiae.





- Labs


CBC & Chem 7: 


                                 12/15/20 04:42





                                 12/15/20 04:42


Labs: 


                  Abnormal Lab Results - Last 24 Hours (Table)











  12/14/20 12/14/20 12/14/20 Range/Units





  11:41 16:49 17:51 


 


WBC     (3.8-10.6)  k/uL


 


Neutrophils #     (1.3-7.7)  k/uL


 


Lymphocytes #     (1.0-4.8)  k/uL


 


Sodium     (137-145)  mmol/L


 


Chloride     ()  mmol/L


 


BUN     (9-20)  mg/dL


 


Glucose     (74-99)  mg/dL


 


POC Glucose (mg/dL)  258 H  338 H  313 H  (75-99)  mg/dL


 


Calcium     (8.4-10.2)  mg/dL


 


ALT     (4-49)  U/L


 


Total Protein     (6.3-8.2)  g/dL


 


Albumin     (3.5-5.0)  g/dL














  12/14/20 12/14/20 12/14/20 Range/Units





  18:53 19:52 20:52 


 


WBC     (3.8-10.6)  k/uL


 


Neutrophils #     (1.3-7.7)  k/uL


 


Lymphocytes #     (1.0-4.8)  k/uL


 


Sodium     (137-145)  mmol/L


 


Chloride     ()  mmol/L


 


BUN     (9-20)  mg/dL


 


Glucose     (74-99)  mg/dL


 


POC Glucose (mg/dL)  274 H  163 H  129 H  (75-99)  mg/dL


 


Calcium     (8.4-10.2)  mg/dL


 


ALT     (4-49)  U/L


 


Total Protein     (6.3-8.2)  g/dL


 


Albumin     (3.5-5.0)  g/dL














  12/14/20 12/14/20 12/15/20 Range/Units





  22:07 23:05 01:10 


 


WBC     (3.8-10.6)  k/uL


 


Neutrophils #     (1.3-7.7)  k/uL


 


Lymphocytes #     (1.0-4.8)  k/uL


 


Sodium     (137-145)  mmol/L


 


Chloride     ()  mmol/L


 


BUN     (9-20)  mg/dL


 


Glucose     (74-99)  mg/dL


 


POC Glucose (mg/dL)  115 H  141 H  141 H  (75-99)  mg/dL


 


Calcium     (8.4-10.2)  mg/dL


 


ALT     (4-49)  U/L


 


Total Protein     (6.3-8.2)  g/dL


 


Albumin     (3.5-5.0)  g/dL














  12/15/20 12/15/20 12/15/20 Range/Units





  02:17 04:40 04:42 


 


WBC    15.9 H  (3.8-10.6)  k/uL


 


Neutrophils #    14.8 H  (1.3-7.7)  k/uL


 


Lymphocytes #    0.4 L  (1.0-4.8)  k/uL


 


Sodium     (137-145)  mmol/L


 


Chloride     ()  mmol/L


 


BUN     (9-20)  mg/dL


 


Glucose     (74-99)  mg/dL


 


POC Glucose (mg/dL)  137 H  148 H   (75-99)  mg/dL


 


Calcium     (8.4-10.2)  mg/dL


 


ALT     (4-49)  U/L


 


Total Protein     (6.3-8.2)  g/dL


 


Albumin     (3.5-5.0)  g/dL














  12/15/20 12/15/20 12/15/20 Range/Units





  04:42 06:15 07:16 


 


WBC     (3.8-10.6)  k/uL


 


Neutrophils #     (1.3-7.7)  k/uL


 


Lymphocytes #     (1.0-4.8)  k/uL


 


Sodium  135 L    (137-145)  mmol/L


 


Chloride  108 H    ()  mmol/L


 


BUN  27 H    (9-20)  mg/dL


 


Glucose  150 H    (74-99)  mg/dL


 


POC Glucose (mg/dL)   145 H  157 H  (75-99)  mg/dL


 


Calcium  8.0 L    (8.4-10.2)  mg/dL


 


ALT  71 H    (4-49)  U/L


 


Total Protein  5.4 L    (6.3-8.2)  g/dL


 


Albumin  2.6 L    (3.5-5.0)  g/dL














  12/15/20 12/15/20 Range/Units





  08:02 09:08 


 


WBC    (3.8-10.6)  k/uL


 


Neutrophils #    (1.3-7.7)  k/uL


 


Lymphocytes #    (1.0-4.8)  k/uL


 


Sodium    (137-145)  mmol/L


 


Chloride    ()  mmol/L


 


BUN    (9-20)  mg/dL


 


Glucose    (74-99)  mg/dL


 


POC Glucose (mg/dL)  162 H  185 H  (75-99)  mg/dL


 


Calcium    (8.4-10.2)  mg/dL


 


ALT    (4-49)  U/L


 


Total Protein    (6.3-8.2)  g/dL


 


Albumin    (3.5-5.0)  g/dL














Assessment and Plan


Assessment: 





Worsening Covid 19 pneumonia


Worsening acute hypoxic respiratory failure


A. fib with RVR


Fatigue generalized weakness secondary to Covid infection


New-onset diabetes


Hyponatremia due to dehydration, improved


Dehydration


Gastroenteritis secondary to covid infection


Hypomagnesemia


Hypertension


History of MI


DVT prophylaxis: Xarelto


GI prophylaxis: Pepcid


Full code





Plan: 





This is a pleasant 70 years old male who presents with Covid 19 infection with 

pneumonia, mild gastroenteritis and new diabetes.  Also A. fib with RVR  

Continue with zinc and ascorbic acid, continue with gentle hydration.  Patient 

was placed on an insulin drip along with 5 units of Levemir daily and will 

continue Accu-Cheks closely.  Cardizem has been discontinued and patient is 

maintained on oral amiodarone.  Currently sinus rhythm.  Pulmonary and 

Cardiology is following along  Patient continues on BiPAP at night and presently

on Airvo and tolerating thus far.  Will continue to monitor closely. Further 

recommendations to follow as per clinical course of the patient. Prognosis is 

poor and extremely guarded.

## 2020-12-15 NOTE — P.PN
Subjective


Progress Note Date: 12/15/20


Principal diagnosis: 





Acute hypoxic respiratory failure secondary to covid 19 pneumonia.


On 12/13/2020, the patient got transferred to the intensive care unit overnight.

 I saw him in a medical floor and the patient was having acute hypoxic 

respiratory failure secondary to coronavirus/Covid 19 related pneumonia.  The 

patient was in the 100%.  After arriving to the intensive care unit, the patient

had to be placed on a BiPAP which is currently running at a pressure of 12/6 cm 

of water with an FiO2 of 20%.  His pulse ox currently is 96%.  Blood gases are 

pending from this morning.  He is resting comfortably on the BiPAP and he is 

able to tolerated without any major difficulties.  His cardiac rhythm has slowed

down.  He remains on atrial fibrillation.  He is on a Cardizem drip running at 

20 mg an hour.  He is also on metoprolol at a dose of 75 mg by mouth three a day

and amiodarone 400 mg by mouth twice a day.  Patient is on Xarelto.  D-dimer is 

elevated which is consistent with his coronavirus/Covid 19 related infection.  

His white cell count of 13.5.  His LDH level is at 904.  The proBNP level was 

3940 and his troponin was at 0.142.  I gave him a dose of Lasix yesterday.  His 

fluid balance over the past 24 hours has been -996 mL.  The chest x-ray still 

showing diffuse bilateral pulmonary infiltrates which is essentially unchanged 

compared to yesterday.  There are diffuse peripheral infiltrates bilaterally.





Reevaluated today on 12/14/20, patient is on BiPAP 12/6, FiO2 is 85%, patient 

seems to be comfortable, he is not in distress, asking to be fed, hence I will 

switch the patient to airvo, and continue to titrate accordingly to maintain O2 

saturation above 90% possible.  Patient remains on Cardizem drip for his atrial 

fibrillation, his rate is controlled, his IV fluid is a 0.9 normal saline.  CBC 

is relatively normal.  Inflammatory markers are elevated, LDH is 995 and his C-

reactive protein is 24.2.


*Normal renal profile is normal chest x-ray is quite abnormal showing diffuse 

interstitial infiltrates bilaterally.





Reevaluated today on 12/15/20, patient remains in the ICU, his pulmonary status 

is marginal at best.  Remains on high flow airvo, FiO2 of 85%, and 60 L flow.  

His O2 saturation is marginal between 90-93% at best.  Patient is on insulin 

drip mostly, 2 units per hour, his Cardizem is off, and he is now in sinus 

rhythm.  Patient completed his course of remdesivir, and he is on Decadron 6 mg 

every 12 hours, is also on Xarelto.  Patient is complaining of shortness of 

breath with any activity.  But looks comfortable at rest.  Remains on the Covid 

19 cocktail.  CBC showed leukocytosis with WBC count of 15.9 hemoglobin is 13.4.

 Basic metabolic profile and renal profile are normal











Objective





- Vital Signs


Vital signs: 


                                   Vital Signs











Temp  98.6 F   12/15/20 08:00


 


Pulse  68   12/15/20 12:00


 


Resp  24   12/15/20 12:00


 


BP  126/84   12/15/20 12:00


 


Pulse Ox  90 L  12/15/20 12:00








                                 Intake & Output











 12/14/20 12/15/20 12/15/20





 18:59 06:59 18:59


 


Intake Total 1855.841 729.458 411.144


 


Output Total 700 1700 600


 


Balance 1155.841 -970.542 -188.856


 


Intake:   


 


   600 300


 


    Sodium Chloride 0.9% 1, 600 600 300





    000 ml @ 50 mls/hr IV .   





    Q20H [facility] Rx#:[id number]   


 


  Intake, IV Titration 155.841 29.458 11.144





  Amount   


 


    Diltiazem 125 mg In 126.5  





    Sodium Chloride 0.9% 100   





    ml @ 10 MG/HR 10 mls/hr   





    IV .D97V09H [facility] Rx#:   





    [id number]   


 


    Insulin Regular 100 unit 29.341 29.458 11.144





    In Sodium Chloride 0.9%   





    100 ml @ Per Protocol IV   





    .Q0M [facility] Rx#:[id number]   


 


  Oral 1100 100 100


 


Output:   


 


  Urine 700 1700 600


 


Other:   


 


  Voiding Method Urinal Urinal Urinal


 


  # Voids  1 














- Exam


GENERAL: The patient is alert and oriented x3, on airvo


HEENT: Pupils are round and equally reacting to light. EOMI. No scleral icterus.

No conjunctival pallor. Normocephalic, atraumatic. No pharyngeal erythema. No 

thyromegaly. 


CARDIOVASCULAR: S1 and S2 present. No murmurs, rubs, or gallops. 


PULMONARY: Crackles at the bases, no rhonchi and no wheezes.


ABDOMEN: Soft, nontender, nondistended, normoactive bowel sounds. No palpable 

organomegaly. 


MUSCULOSKELETAL: No joint swelling or deformity. 


EXTREMITIES: No clubbing edema or cyanosis


NEUROLOGICAL: Alert and oriented 3, no gross focal deficits


SKIN: No rashes. no petechiae.











- Labs


CBC & Chem 7: 


                                 12/15/20 04:42





                                 12/15/20 04:42


Labs: 


                  Abnormal Lab Results - Last 24 Hours (Table)











  12/14/20 12/14/20 12/14/20 Range/Units





  16:49 17:51 18:53 


 


WBC     (3.8-10.6)  k/uL


 


Neutrophils #     (1.3-7.7)  k/uL


 


Lymphocytes #     (1.0-4.8)  k/uL


 


Sodium     (137-145)  mmol/L


 


Chloride     ()  mmol/L


 


BUN     (9-20)  mg/dL


 


Glucose     (74-99)  mg/dL


 


POC Glucose (mg/dL)  338 H  313 H  274 H  (75-99)  mg/dL


 


Calcium     (8.4-10.2)  mg/dL


 


ALT     (4-49)  U/L


 


Total Protein     (6.3-8.2)  g/dL


 


Albumin     (3.5-5.0)  g/dL














  12/14/20 12/14/20 12/14/20 Range/Units





  19:52 20:52 22:07 


 


WBC     (3.8-10.6)  k/uL


 


Neutrophils #     (1.3-7.7)  k/uL


 


Lymphocytes #     (1.0-4.8)  k/uL


 


Sodium     (137-145)  mmol/L


 


Chloride     ()  mmol/L


 


BUN     (9-20)  mg/dL


 


Glucose     (74-99)  mg/dL


 


POC Glucose (mg/dL)  163 H  129 H  115 H  (75-99)  mg/dL


 


Calcium     (8.4-10.2)  mg/dL


 


ALT     (4-49)  U/L


 


Total Protein     (6.3-8.2)  g/dL


 


Albumin     (3.5-5.0)  g/dL














  12/14/20 12/15/20 12/15/20 Range/Units





  23:05 01:10 02:17 


 


WBC     (3.8-10.6)  k/uL


 


Neutrophils #     (1.3-7.7)  k/uL


 


Lymphocytes #     (1.0-4.8)  k/uL


 


Sodium     (137-145)  mmol/L


 


Chloride     ()  mmol/L


 


BUN     (9-20)  mg/dL


 


Glucose     (74-99)  mg/dL


 


POC Glucose (mg/dL)  141 H  141 H  137 H  (75-99)  mg/dL


 


Calcium     (8.4-10.2)  mg/dL


 


ALT     (4-49)  U/L


 


Total Protein     (6.3-8.2)  g/dL


 


Albumin     (3.5-5.0)  g/dL














  12/15/20 12/15/20 12/15/20 Range/Units





  04:40 04:42 04:42 


 


WBC   15.9 H   (3.8-10.6)  k/uL


 


Neutrophils #   14.8 H   (1.3-7.7)  k/uL


 


Lymphocytes #   0.4 L   (1.0-4.8)  k/uL


 


Sodium    135 L  (137-145)  mmol/L


 


Chloride    108 H  ()  mmol/L


 


BUN    27 H  (9-20)  mg/dL


 


Glucose    150 H  (74-99)  mg/dL


 


POC Glucose (mg/dL)  148 H    (75-99)  mg/dL


 


Calcium    8.0 L  (8.4-10.2)  mg/dL


 


ALT    71 H  (4-49)  U/L


 


Total Protein    5.4 L  (6.3-8.2)  g/dL


 


Albumin    2.6 L  (3.5-5.0)  g/dL














  12/15/20 12/15/20 12/15/20 Range/Units





  06:15 07:16 08:02 


 


WBC     (3.8-10.6)  k/uL


 


Neutrophils #     (1.3-7.7)  k/uL


 


Lymphocytes #     (1.0-4.8)  k/uL


 


Sodium     (137-145)  mmol/L


 


Chloride     ()  mmol/L


 


BUN     (9-20)  mg/dL


 


Glucose     (74-99)  mg/dL


 


POC Glucose (mg/dL)  145 H  157 H  162 H  (75-99)  mg/dL


 


Calcium     (8.4-10.2)  mg/dL


 


ALT     (4-49)  U/L


 


Total Protein     (6.3-8.2)  g/dL


 


Albumin     (3.5-5.0)  g/dL














  12/15/20 12/15/20 12/15/20 Range/Units





  09:08 10:55 11:58 


 


WBC     (3.8-10.6)  k/uL


 


Neutrophils #     (1.3-7.7)  k/uL


 


Lymphocytes #     (1.0-4.8)  k/uL


 


Sodium     (137-145)  mmol/L


 


Chloride     ()  mmol/L


 


BUN     (9-20)  mg/dL


 


Glucose     (74-99)  mg/dL


 


POC Glucose (mg/dL)  185 H  196 H  185 H  (75-99)  mg/dL


 


Calcium     (8.4-10.2)  mg/dL


 


ALT     (4-49)  U/L


 


Total Protein     (6.3-8.2)  g/dL


 


Albumin     (3.5-5.0)  g/dL














Assessment and Plan


Assessment: 





Impression:


Acute hypoxic or short failure


Acute covid 19 pneumonitis.


New onset Atrial fibrillation with RVR.


New-onset diabetes.


Acute dehydration on presentation.


Acute gastroenteritis secondary to Covid infection


Benign essential hypertension.


History of underlying coronary artery disease.


Right upper lobe pulmonary embolism, patient is on Xarelto.


Troponin leak.


History of gout.


History of right upper lobe nodule to be monitored on outpatient basis.





Recommendation:


Continue Decadron.


done with remdesivir


Titrate FiO2 accordingly..


Continue diuretics.


Off Cardizem.


Continue oral beta blockers.


Continue Xarelto.


Continue GI and DVT prophylaxis.


Continue to monitor in the ICU, his pulmonary status is marginal at best.


Prognosis is definitely poor and guarded, and his chest x-ray findings are very 

concerning.


Critical care time is over 30 minutes.





Time with Patient: Greater than 30

## 2020-12-15 NOTE — PN
PROGRESS NOTE



DATE OF SERVICE:

12/15/2020



REASON FOR FOLLOWUP:

COVID-19 pneumonia.



INTERVAL HISTORY:

The patient is currently afebrile.  He remains short of breath and BiPAP-dependent.

The patient denies having any chest pain.  He did have some cough; no sputum. No

abdominal pain or diarrhea.



PHYSICAL EXAMINATION:

Blood pressure 125/70 with pulse of 71, temperature 97.  He is 93% on 60% FiO2.

General description is an elderly male lying in bed in no distress.

RESPIRATORY SYSTEM: Unlabored breathing. Coarse breath sounds at the bases bilaterally.

HEART: S1, S2.  Regular rate and rhythm.

ABDOMEN: Soft. No tenderness.



LABS:

Hemoglobin is 13.4, white count 15.9, BUN of 27, creatinine 0.86.



DIAGNOSTIC IMPRESSION AND PLAN:

Patient with acute COVID-19 pneumonia in this patient who did have overall worsening

and worsening findings in the right lung base. Patient is covered with dexamethasone,

_____ and zinc with concern for possible cytokine storm. Will repeat his inflammatory

markers tomorrow and possible consideration for _____. Continue with supportive care.





MMODL / IJN: [id number] / Job#: [id number]

## 2020-12-16 LAB
ALBUMIN SERPL-MCNC: 2.5 G/DL (ref 3.5–5)
ALP SERPL-CCNC: 103 U/L (ref 38–126)
ALT SERPL-CCNC: 55 U/L (ref 4–49)
ANION GAP SERPL CALC-SCNC: 5 MMOL/L
AST SERPL-CCNC: 34 U/L (ref 17–59)
BASOPHILS # BLD AUTO: 0 K/UL (ref 0–0.2)
BASOPHILS NFR BLD AUTO: 0 %
BUN SERPL-SCNC: 24 MG/DL (ref 9–20)
CALCIUM SPEC-MCNC: 8.1 MG/DL (ref 8.4–10.2)
CHLORIDE SERPL-SCNC: 106 MMOL/L (ref 98–107)
CK SERPL-CCNC: 55 U/L (ref 55–170)
CO2 SERPL-SCNC: 24 MMOL/L (ref 22–30)
D DIMER PPP FEU-MCNC: 14.01 MG/L FEU (ref ?–0.6)
EOSINOPHIL # BLD AUTO: 0 K/UL (ref 0–0.7)
EOSINOPHIL NFR BLD AUTO: 0 %
ERYTHROCYTE [DISTWIDTH] IN BLOOD BY AUTOMATED COUNT: 4.27 M/UL (ref 4.3–5.9)
ERYTHROCYTE [DISTWIDTH] IN BLOOD: 13.2 % (ref 11.5–15.5)
FERRITIN SERPL-MCNC: 1019.2 NG/ML (ref 22–322)
FIBRINOGEN PPP-MCNC: 490 MG/DL (ref 200–500)
GLUCOSE BLD-MCNC: 113 MG/DL (ref 75–99)
GLUCOSE BLD-MCNC: 147 MG/DL (ref 75–99)
GLUCOSE BLD-MCNC: 193 MG/DL (ref 75–99)
GLUCOSE BLD-MCNC: 236 MG/DL (ref 75–99)
GLUCOSE BLD-MCNC: 238 MG/DL (ref 75–99)
GLUCOSE BLD-MCNC: 250 MG/DL (ref 75–99)
GLUCOSE BLD-MCNC: 321 MG/DL (ref 75–99)
GLUCOSE SERPL-MCNC: 163 MG/DL (ref 74–99)
HCT VFR BLD AUTO: 39.8 % (ref 39–53)
HGB BLD-MCNC: 13.2 GM/DL (ref 13–17.5)
LDH SPEC-CCNC: 1456 U/L (ref 313–618)
LYMPHOCYTES # SPEC AUTO: 0.4 K/UL (ref 1–4.8)
LYMPHOCYTES NFR SPEC AUTO: 3 %
MCH RBC QN AUTO: 31 PG (ref 25–35)
MCHC RBC AUTO-ENTMCNC: 33.2 G/DL (ref 31–37)
MCV RBC AUTO: 93.3 FL (ref 80–100)
MONOCYTES # BLD AUTO: 0.4 K/UL (ref 0–1)
MONOCYTES NFR BLD AUTO: 3 %
NEUTROPHILS # BLD AUTO: 12.5 K/UL (ref 1.3–7.7)
NEUTROPHILS NFR BLD AUTO: 93 %
PLATELET # BLD AUTO: 195 K/UL (ref 150–450)
POTASSIUM SERPL-SCNC: 4.1 MMOL/L (ref 3.5–5.1)
PROT SERPL-MCNC: 5.3 G/DL (ref 6.3–8.2)
SODIUM SERPL-SCNC: 135 MMOL/L (ref 137–145)
WBC # BLD AUTO: 13.4 K/UL (ref 3.8–10.6)

## 2020-12-16 RX ADMIN — Medication SCH MG: at 10:12

## 2020-12-16 RX ADMIN — INSULIN ASPART SCH UNIT: 100 INJECTION, SOLUTION INTRAVENOUS; SUBCUTANEOUS at 20:33

## 2020-12-16 RX ADMIN — METOPROLOL TARTRATE SCH MG: 25 TABLET, FILM COATED ORAL at 10:13

## 2020-12-16 RX ADMIN — AMIODARONE HYDROCHLORIDE SCH MG: 200 TABLET ORAL at 10:13

## 2020-12-16 RX ADMIN — DEXTROSE SCH MG: 50 INJECTION, SOLUTION INTRAVENOUS at 20:34

## 2020-12-16 RX ADMIN — PANTOPRAZOLE SODIUM SCH MG: 40 TABLET, DELAYED RELEASE ORAL at 10:12

## 2020-12-16 RX ADMIN — DEXTROSE SCH MG: 50 INJECTION, SOLUTION INTRAVENOUS at 10:12

## 2020-12-16 RX ADMIN — METOPROLOL TARTRATE SCH MG: 25 TABLET, FILM COATED ORAL at 17:46

## 2020-12-16 RX ADMIN — INSULIN ASPART SCH UNIT: 100 INJECTION, SOLUTION INTRAVENOUS; SUBCUTANEOUS at 12:46

## 2020-12-16 RX ADMIN — AMIODARONE HYDROCHLORIDE SCH MG: 200 TABLET ORAL at 20:34

## 2020-12-16 RX ADMIN — CEFAZOLIN SCH MLS/HR: 330 INJECTION, POWDER, FOR SOLUTION INTRAMUSCULAR; INTRAVENOUS at 12:49

## 2020-12-16 RX ADMIN — OXYCODONE HYDROCHLORIDE AND ACETAMINOPHEN SCH MG: 500 TABLET ORAL at 10:12

## 2020-12-16 RX ADMIN — INSULIN ASPART SCH UNIT: 100 INJECTION, SOLUTION INTRAVENOUS; SUBCUTANEOUS at 10:14

## 2020-12-16 RX ADMIN — INSULIN ASPART SCH UNIT: 100 INJECTION, SOLUTION INTRAVENOUS; SUBCUTANEOUS at 17:46

## 2020-12-16 RX ADMIN — INSULIN ASPART SCH UNIT: 100 INJECTION, SOLUTION INTRAVENOUS; SUBCUTANEOUS at 04:50

## 2020-12-16 RX ADMIN — METOPROLOL TARTRATE SCH MG: 25 TABLET, FILM COATED ORAL at 21:58

## 2020-12-16 NOTE — P.PN
Subjective


Progress Note Date: 12/16/20





From Records


Mr. Chavez is a 70-year-old male,  who is a patient of Dr. Stovall with a past 

medical history of hypertension,MI coming in with a chief complaint of fatigue 

and generalized weakness that have been ongoing for past 1 week.  Patient states

that he has been having poor appetite loss of smell and loss of taste for the 

past 1 week.  He states that he has been sleeping for more than 20 hours a day. 

Patient denies having any cough or difficulty in breathing.  He denies having 

any chest pain or palpitations.  He denies having any dysuria or hematuria.  

Patient denies having any history of diabetes but his blood sugars are in 300s 

to 400s.  In the emergency room patient had a chest x-ray showing no acute 

cardiopulmonary process and an EKG showing normal sinus rhythm.  He had labs 

done showing white count of 3.9, hemoglobin 14.4, platelets 166.  Sodium 131, 

potassium 3.9, chloride.  BUN 15, creatinine 0.93 C-reactive protein 59, albumin

3.3 coronavirus PCR positive. 





On 12/6/2020 patient was seen and examined.  He is currently resting comfortably

in bed.  Appears to be no acute distress.  On reviewing the vitals patient 

continues to fever as high as 102.3.  His blood pressure has been stable around 

130s 80s and he saturating at 95% on room air.  Patient's blood sugars have been

running on the higher side.





On 12/07/2020- patient was seen and examined and the general medical floors.  He

is resting comfortably in bed.  Overnight active issues reported by nursing 

staff. Patient states that his difficulty in breathing is improving.  On 

reviewing the vitals patient's T-max is 98.4, heart rate 72, respiratory rate 

127-79 and saturating at 93% on room air.  On reviewing vitals patient's white 

count of 2.8, hemoglobin 14.4, platelets 189.  Sodium 136, potassium 4.6, 

chloride 102.  BUN 23 and creatinine of 1.0.  Blood sugars have been running 

high in 200s to 300s.  C-reactive protein 7.5.








12/08/2020


This is a pleasant 70 years old male who presents with cough with infection and 

found to have new onset diabetes with elevated hemoglobin A1c at 14%.


Patient currently with no respiratory symptoms, no chest pain or dyspnea 

coughing.  Yesterday he has a regular bowel movement but today he had 1 loose 

bowel movement but no significant abdominal pain or nausea vomiting.


Also he is running a fever of 101.  He saturating 90% at room air.


BMP and CBC were reviewed and they were unremarkable except for mild leukopenia 

2.8K.  D-dimer is negative at 0.34.  Coronavirus is detected.  Chest x-ray is 

normal


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  Normal saline at 75 mL/h was 

added.  Also Levemir 10 units daily and metformin 1000 mg twice daily has been 

added because his sugar still not controlled


Patient is able to eat % of his meal





12/9/20


Patient had a rough night because of fever, his breathing is quiet and stable 

and he's only on 2 L oxygen.  Is still have diarrhea about twice per day which 

is similar to the day before.  No abdominal pain or vomiting.  No significant 

coughing.  unstable


Chest x-ray: Worsening infiltrates, Pronecalcitonin is elevated at 0.27, 

patient is started on Rocephin and Zithromax.


He is new onset diabetes and also is on dexamethasone and his sugar more than 

300, so Levemir was decreased from 10-30 units daily.  Also he is on metformin 

1000 and Amaryl 1 mg.


Continue on Xarelto, normocephalic 75, remedisivr, dexamethasone 6 mg daily.





12/10/2020


Patient hadn't good night  because he could not sleep.  No respiratory symptoms,

ongoing generalized weakness.


Yesterday he has watery diarrhea but this morning he had small more formed bowel

movement.  No abdominal pain.


Persistent fever have subsided and his been afebrile for more than 24 hours.  We

will check Tylenol as needed


His sugar is better controlled after increasing his insulin to 30 units.


Antibiotics with ceftriaxone and Zithromax were admitted with high protei

ncalcitonin.  We will try to check sputum culture.


Check labs in the morning





12/11/2020


Patient feels much better today, his fever subsided and his on Tylenol as needed

currently.  No respiratory issue or symptom.  He had a little formed bowel 

movement today.  Currently patient remains on the same cocktail for comfort 

infection besides remedisivr


Hemodynamically stable.  Vitals are stable showing mild leukocytosis of 11.6 K





12/12/2020


pt is moved to select unit , pt developed a fib and RVR and he was started on 

Cardizem and amiodarone drip and is already on Xarelto. 


Also patient on Lopressor 75 mg 3 times a day Also he developed more hypoxic and

is currently on 50 L oxygen via nasal cannula saturating 90%.  


Repeat chest x-ray Moderate peripheral-based opacities throughout both lungs w

ith no pleural effusion.  Blood gas on BOTH HIS WITH PH 7.48, LOW PCO2 OF 22 AND

LOW PO2 AT 58 PATIENT HAS BEEN EVALUATED BY PULMONARY SERVICE AND PATIENT MAY 

NEED TO GO TO THE ICU AS WELL.


LEFT SHOWING STABLE LEUKOCYTOSIS AT 14.9 K, ELEVATED D-DIMER 3.1, BMP IS 

UNREMARKABLE AND WORSENING OF THE EDGE AND C-REACTIVE PROTEIN


Patient is kept on remedisivr, dexamethasone 6 mg daily.


However patient diarrhea has improved 








12/13/2020


Patient today kept in the ICU on BiPAP saturating 100% however he looks 

comfortable.  Distal short of breath and dyspneic


Rest of vitals are stable


Laps looks stable, inflammatory markers LDH and C-reactive protein still 

trending up slowly compared to yesterday.proCalcitonin is negative.  D-dimer is

trending up at 5.7


Chest x-ray showing stable bilateral lung infiltrates


He remains on Decadron and remdesivir


Also I kept on Cardizem drip at 20 mg an hour and amiodarone 400 mg twice daily 

and metoprolol 75 mg 3 times a day, vitamin C and A. fib and heart rate ranging 

between .


He has an echo from 07/22/2020 showed ejection fraction of 50-55%.  BNP is el

evated at 3940.


Discontinue ceftriaxone.  Keep Zithromax for now.  Patient to continue on 

Xarelto 


patient is nothing by mouth WE'LL KEEP HIM ON GENTLE HYDRATION AT 50 ML/H.  ALSO

GOT 1 DOSE OF LASIX





12/14/2020


Patient is seen and evaluated and follow-up continues to be closely monitored in

the ICU.  Patient has been taken off the BiPAP and placed on Airvo with an 

oxygen rate of 60 and FiO2 at 85% and is currently 91%.  To continue to wean as 

tolerated with the use of BiPAP as needed as well.  Patient is afebrile.  

Patient's d-dimer continues to be elevated and has increased at 11.67 and rodrigo

ent is maintained on Xarelto and will continue at this time.  Patient is eating 

today and tolerating with no reports of nausea or vomiting noted.  Multiple 

medical consultations following including cardiology.  Cardizem drip has been 

decreased to 10 and will continue to monitor this patient continues on 

amiodarone as well.  Heart rate currently in the 70s.  Blood sugars being 

controlled and will continue sliding scale along with long-acting at this time. 

Patient to continue with dexamethasone along with vitamin C and D and zinc 

supplements.  Patient is currently off antibiotics and will continue to monitor 

closely.  Chest x-ray today shows cardiomegaly with bilateral multifocal acute 

infiltrates greatest in the periphery, organizing consolidation in the lower 

lungs with no significant change. 





12/15/2020


Patient seen in follow-up continues to be closely monitored in the ICU.  Patient

is maintained on BiPAP at night and transitioning to Airvo during the day.  

Patient was placed on insulin drip and will continue to monitor Accu-Cheks 

closely.  Patient continues to be severely dyspneic with any exertion and is 89-

90% with an O2 flow of 60 and FiO2 of 85.  IV Cardizem drip has been d

iscontinued and patient is maintained on amiodarone oral and will continue at 

this time.  Anticoagulation of Xarelto continues.  Chest x-rays continue to show

worsening and consistent with Covid 19 infection progression. 





12/16/2020


Patient continues to be in the ICU and maintained on BiPAP with airvo during the

day and not really showing any improvements as far as oxygenation.  Patient  

states his breathing has not worsened but is also not improved.  Patient is 

hungry and awaiting to be switched to airvo so he can eat.  Blood sugars 

continue to be elevated and insulin drip was discontinued.  Will increase 

Levemir to 20 units daily and continue with sliding scale.  Patient remains off 

Cardizem drip and is currently on oral amiodarone and will continue at this 

time.  D-dimer also continues to be elevated and is currently 14.01.  Patient is

maintained on Xarelto.  Current sodium is 135, potassium is 4.1, creatinine is 

0.85, WBC is 13.4.  Discussed with the patient about CODE STATUS and patient 

wishes to remain a full code.  Chest x-ray continues to show no improvement from

previous x-ray.








CONSTITUTIONAL: No fever, no malaise, no fatigue. 


HEENT: No recent visual problems or hearing problems. Denied any sore throat. 


CARDIOVASCULAR: No  orthopnea, PND, no palpitations, no syncope. 


PULMONARY: reports continued shortness of breath, no cough, no hemoptysis. 


GASTROINTESTINAL: No diarrhea, no nausea, no vomiting, no abdominal pain. 

Normoactive bowel sounds. 


NEUROLOGICAL: No headaches, reports weakness, no numbness. 





Objective





- Vital Signs


Vital signs: 


                                   Vital Signs











Temp  98.5 F   12/16/20 00:00


 


Pulse  68   12/16/20 09:00


 


Resp  23   12/16/20 09:00


 


BP  148/85   12/16/20 09:00


 


Pulse Ox  97   12/16/20 09:00








                                 Intake & Output











 12/15/20 12/16/20 12/16/20





 18:59 06:59 18:59


 


Intake Total 0624.378 7153 150


 


Output Total 1500 1450 


 


Balance -454.937 -350 150


 


Weight  93.6 kg 


 


Intake:   


 


   600 150


 


    Sodium Chloride 0.9% 1, 600 600 150





    000 ml @ 50 mls/hr IV .   





    Q20H STEPH Rx#:[id number]   


 


  Intake, IV Titration 45.063  





  Amount   


 


    Insulin Regular 100 unit 45.063  





    In Sodium Chloride 0.9%   





    100 ml @ Per Protocol IV   





    .Q0M STEPH Rx#:[id number]   


 


  Oral 400 500 


 


Output:   


 


  Urine 1500 1450 


 


Other:   


 


  Voiding Method Urinal Urinal 














- Exam





GENERAL: The patient is alert and oriented x3, not in any acute distress. Well 

developed, well nourished.  Presently on an airvo with intermittent BiPAP


HEENT: Pupils are round and equally reacting to light. EOMI. No scleral icterus.

No conjunctival pallor. Normocephalic, atraumatic. No pharyngeal erythema. No 

thyromegaly. 


CARDIOVASCULAR: S1 and S2 present. No murmurs, rubs, or gallops. 


PULMONARY: Diminished breath sounds bilaterally with a few scattered crackles 

noted


ABDOMEN: Soft, nontender, nondistended, normoactive bowel sounds. No palpable 

organomegaly. 


MUSCULOSKELETAL: No joint swelling or deformity. 


EXTREMITIES: No cyanosis, clubbing, or pedal edema. 


NEUROLOGICAL: Gross neurological examination did not reveal any focal deficits. 


SKIN: No rashes. no petechiae.





- Labs


CBC & Chem 7: 


                                 12/16/20 04:05





                                 12/16/20 04:05


Labs: 


                  Abnormal Lab Results - Last 24 Hours (Table)











  12/15/20 12/15/20 12/15/20 Range/Units





  10:55 11:58 13:52 


 


WBC     (3.8-10.6)  k/uL


 


RBC     (4.30-5.90)  m/uL


 


Neutrophils #     (1.3-7.7)  k/uL


 


Lymphocytes #     (1.0-4.8)  k/uL


 


D-Dimer     (<0.60)  mg/L FEU


 


Sodium     (137-145)  mmol/L


 


BUN     (9-20)  mg/dL


 


Glucose     (74-99)  mg/dL


 


POC Glucose (mg/dL)  196 H  185 H  211 H  (75-99)  mg/dL


 


Calcium     (8.4-10.2)  mg/dL


 


Ferritin     (22.0-322.0)  ng/mL


 


ALT     (4-49)  U/L


 


Lactate Dehydrogenase     (313-618)  U/L


 


C-Reactive Protein     (<10.0)  mg/L


 


Total Protein     (6.3-8.2)  g/dL


 


Albumin     (3.5-5.0)  g/dL














  12/15/20 12/15/20 12/15/20 Range/Units





  15:45 21:10 23:40 


 


WBC     (3.8-10.6)  k/uL


 


RBC     (4.30-5.90)  m/uL


 


Neutrophils #     (1.3-7.7)  k/uL


 


Lymphocytes #     (1.0-4.8)  k/uL


 


D-Dimer     (<0.60)  mg/L FEU


 


Sodium     (137-145)  mmol/L


 


BUN     (9-20)  mg/dL


 


Glucose     (74-99)  mg/dL


 


POC Glucose (mg/dL)  178 H  280 H  291 H  (75-99)  mg/dL


 


Calcium     (8.4-10.2)  mg/dL


 


Ferritin     (22.0-322.0)  ng/mL


 


ALT     (4-49)  U/L


 


Lactate Dehydrogenase     (313-618)  U/L


 


C-Reactive Protein     (<10.0)  mg/L


 


Total Protein     (6.3-8.2)  g/dL


 


Albumin     (3.5-5.0)  g/dL














  12/16/20 12/16/20 12/16/20 Range/Units





  04:05 04:05 04:05 


 


WBC  13.4 H    (3.8-10.6)  k/uL


 


RBC  4.27 L    (4.30-5.90)  m/uL


 


Neutrophils #  12.5 H    (1.3-7.7)  k/uL


 


Lymphocytes #  0.4 L    (1.0-4.8)  k/uL


 


D-Dimer    14.01 H  (<0.60)  mg/L FEU


 


Sodium   135 L   (137-145)  mmol/L


 


BUN   24 H   (9-20)  mg/dL


 


Glucose   163 H   (74-99)  mg/dL


 


POC Glucose (mg/dL)     (75-99)  mg/dL


 


Calcium   8.1 L   (8.4-10.2)  mg/dL


 


Ferritin   1019.2 H   (22.0-322.0)  ng/mL


 


ALT   55 H   (4-49)  U/L


 


Lactate Dehydrogenase   1456 H   (313-618)  U/L


 


C-Reactive Protein   48.0 H   (<10.0)  mg/L


 


Total Protein   5.3 L   (6.3-8.2)  g/dL


 


Albumin   2.5 L   (3.5-5.0)  g/dL














  12/16/20 12/16/20 Range/Units





  04:48 09:39 


 


WBC    (3.8-10.6)  k/uL


 


RBC    (4.30-5.90)  m/uL


 


Neutrophils #    (1.3-7.7)  k/uL


 


Lymphocytes #    (1.0-4.8)  k/uL


 


D-Dimer    (<0.60)  mg/L FEU


 


Sodium    (137-145)  mmol/L


 


BUN    (9-20)  mg/dL


 


Glucose    (74-99)  mg/dL


 


POC Glucose (mg/dL)  147 H  193 H  (75-99)  mg/dL


 


Calcium    (8.4-10.2)  mg/dL


 


Ferritin    (22.0-322.0)  ng/mL


 


ALT    (4-49)  U/L


 


Lactate Dehydrogenase    (313-618)  U/L


 


C-Reactive Protein    (<10.0)  mg/L


 


Total Protein    (6.3-8.2)  g/dL


 


Albumin    (3.5-5.0)  g/dL














Assessment and Plan


Assessment: 





Worsening Covid 19 pneumonia


Worsening acute hypoxic respiratory failure


A. fib with RVR


Fatigue generalized weakness secondary to Covid infection


New-onset diabetes


Hyponatremia due to dehydration, improved


Dehydration


Gastroenteritis secondary to covid infection


Hypomagnesemia, improved


Hypertension


History of MI


DVT prophylaxis: Xarelto


GI prophylaxis: Pepcid


Full code





Plan: 





This is a pleasant 70 years old male who presents with Covid 19 infection with 

pneumonia, mild gastroenteritis and new diabetes.  Also A. fib with RVR  

Continue with zinc and ascorbic acid, continue with gentle hydration.  Patient 

was placed on an insulin drip along with 5 units of Levemir daily and will 

continue Accu-Cheks closely.  Insulin drip has been discontinued and will 

increase long-acting and continue with sliding scale and close monitoring of 

Accu-Cheks.  Currently sinus rhythm.  Pulmonary and Cardiology is following 

along  Patient continues on BiPAP at night and presently on Airvo and tolerating

thus far.  Chest x-ray continues to show progression of Covid 19 with no 

improvements noted.  Will continue to monitor closely. Further recommendations 

to follow as per clinical course of the patient. Prognosis is poor and extremely

guarded.

## 2020-12-16 NOTE — PN
PROGRESS NOTE



DATE OF SERVICE:

12/16/2020



REASON FOR FOLLOWUP:

Acute COVID-19 pneumonia.



INTERVAL HISTORY:

The patient is currently afebrile.  The patient is still requiring high-flow oxygen.

Feeling slightly better today. Denies having any chest pain.  Minimal cough.  No

abdominal pain or diarrhea.



PHYSICAL EXAMINATION:

Blood pressure 146/81 with a pulse of 54, temperature 98.1. He is 93% on 50% FiO2.

General description is an elderly male lying in bed in no distress.

RESPIRATORY SYSTEM: Unlabored breathing. Coarse breath sounds at the bases bilaterally.

No wheeze.

HEART: S1, S2.  Regular rate and rhythm.

ABDOMEN: Soft. No tenderness.



LABS:

D-dimer is up to 14.01. BUN of 24, creatinine 0.85.



DIAGNOSTIC IMPRESSION AND PLAN:

Patient with acute respiratory failure which  _____ acute COVID-19 pneumonia  _____

currently covered with dexamethasone, Xarelto, zinc and respiratory support. Monitor

his clinical course closely.





MMODL / IJN: [id number] / Job#: [id number]

## 2020-12-16 NOTE — XR
EXAMINATION TYPE: XR chest 1V portable

 

DATE OF EXAM: 12/16/2020

 

CLINICAL HISTORY: Difficulty breathing and covid-19 infection progress study.  

 

TECHNIQUE: Single AP portable supine view of the chest is obtained.

 

COMPARISON: Chest x-ray from one day earlier and older studies.

 

FINDINGS:  Background chronic emphysematous change with multifocal bilateral opacities with organizin
g consolidation lower lungs redemonstrated. Cardiac silhouette size is stable and enlarged. Overlying
 EKG leads redemonstrated. Multilevel spurring in thoracic spine redemonstrated.

 

IMPRESSION: Cardiomegaly with bilateral multifocal acute infiltrates greatest in the periphery and or
ganizing consolidations in the lower lungs noted. Findings consistent with covid 19 infection . No si
gnificant interval change from most recent x-ray.

## 2020-12-16 NOTE — P.PN
Subjective


Progress Note Date: 12/16/20


Principal diagnosis: 





Acute hypoxic respiratory failure secondary to covid 19 pneumonia.


On 12/13/2020, the patient got transferred to the intensive care unit overnight.

 I saw him in a medical floor and the patient was having acute hypoxic 

respiratory failure secondary to coronavirus/Covid 19 related pneumonia.  The 

patient was in the 100%.  After arriving to the intensive care unit, the patient

had to be placed on a BiPAP which is currently running at a pressure of 12/6 cm 

of water with an FiO2 of 20%.  His pulse ox currently is 96%.  Blood gases are 

pending from this morning.  He is resting comfortably on the BiPAP and he is 

able to tolerated without any major difficulties.  His cardiac rhythm has slowed

down.  He remains on atrial fibrillation.  He is on a Cardizem drip running at 

20 mg an hour.  He is also on metoprolol at a dose of 75 mg by mouth three a day

and amiodarone 400 mg by mouth twice a day.  Patient is on Xarelto.  D-dimer is 

elevated which is consistent with his coronavirus/Covid 19 related infection.  

His white cell count of 13.5.  His LDH level is at 904.  The proBNP level was 

3940 and his troponin was at 0.142.  I gave him a dose of Lasix yesterday.  His 

fluid balance over the past 24 hours has been -996 mL.  The chest x-ray still 

showing diffuse bilateral pulmonary infiltrates which is essentially unchanged 

compared to yesterday.  There are diffuse peripheral infiltrates bilaterally.





Reevaluated today on 12/14/20, patient is on BiPAP 12/6, FiO2 is 85%, patient 

seems to be comfortable, he is not in distress, asking to be fed, hence I will 

switch the patient to airvo, and continue to titrate accordingly to maintain O2 

saturation above 90% possible.  Patient remains on Cardizem drip for his atrial 

fibrillation, his rate is controlled, his IV fluid is a 0.9 normal saline.  CBC 

is relatively normal.  Inflammatory markers are elevated, LDH is 995 and his C-

reactive protein is 24.2.


*Normal renal profile is normal chest x-ray is quite abnormal showing diffuse 

interstitial infiltrates bilaterally.





Reevaluated today on 12/15/20, patient remains in the ICU, his pulmonary status 

is marginal at best.  Remains on high flow airvo, FiO2 of 85%, and 60 L flow.  

His O2 saturation is marginal between 90-93% at best.  Patient is on insulin 

drip mostly, 2 units per hour, his Cardizem is off, and he is now in sinus 

rhythm.  Patient completed his course of remdesivir, and he is on Decadron 6 mg 

every 12 hours, is also on Xarelto.  Patient is complaining of shortness of 

breath with any activity.  But looks comfortable at rest.  Remains on the Covid 

19 cocktail.  CBC showed leukocytosis with WBC count of 15.9 hemoglobin is 13.4.

 Basic metabolic profile and renal profile are normal





Reevaluated today on 12/16/20, remains in the ICU, patient is now on BiPAP, IPAP

is 12 EPAP 6 FiO2 is 85%, and his O2 saturation is 94%.  IV fluid is at 50 ML 

per hour, patient completed his treatment with remdesivir, remains on Decadron 6

mg IV push every 12 hours, and he remains on Xarelto.  He is also on the Covid 

19 cocktail.  His pulmonary status is marginal at best, patient will be given 

today a trial of high flow nasal cannula or airvo.  And will titrate 

accordingly.  Chest x-ray continues to show diffuse interstitial infiltrates.  

And some consolidation noted in the right lower lobe.  Inflammatory markers 

remain high LDH is 1456 C-reactive protein is 48.  Electrolytes are normal renal

profile is normal d-dimer is 14.01











Objective





- Vital Signs


Vital signs: 


                                   Vital Signs











Temp  98.1 F   12/16/20 12:00


 


Pulse  64   12/16/20 13:00


 


Resp  22   12/16/20 13:00


 


BP  146/81   12/16/20 13:00


 


Pulse Ox  93 L  12/16/20 13:00








                                 Intake & Output











 12/15/20 12/16/20 12/16/20





 18:59 06:59 18:59


 


Intake Total 7316.042 1325 350


 


Output Total 1500 1450 300


 


Balance -454.937 -350 50


 


Weight  93.6 kg 


 


Intake:   


 


   600 350


 


    Sodium Chloride 0.9% 1, 600 600 350





    000 ml @ 50 mls/hr IV .   





    Q20H STEPH Rx#:[id number]   


 


  Intake, IV Titration 45.063  





  Amount   


 


    Insulin Regular 100 unit 45.063  





    In Sodium Chloride 0.9%   





    100 ml @ Per Protocol IV   





    .Q0M STEPH Rx#:[id number]   


 


  Oral 400 500 


 


Output:   


 


  Urine 1500 1450 300


 


Other:   


 


  Voiding Method Urinal Urinal Urinal














- Exam


GENERAL: The patient is alert and oriented x3, on BiPAP.  12/6/85% FiO2.


HEENT: Pupils are round and equally reacting to light. EOMI. No scleral icterus.

No conjunctival pallor. Normocephalic, atraumatic. No pharyngeal erythema. No 

thyromegaly. 


CARDIOVASCULAR: S1 and S2 present. No murmurs, rubs, or gallops. 


PULMONARY: Crackles at the bases, no rhonchi and no wheezes.


ABDOMEN: Soft, nontender, nondistended, normoactive bowel sounds. No palpable 

organomegaly. 


MUSCULOSKELETAL: No joint swelling or deformity. 


EXTREMITIES: No clubbing edema or cyanosis


NEUROLOGICAL: Alert and oriented 3, no gross focal deficits


SKIN: No rashes. no petechiae.


Psychiatric: Normal mood, affect and normal mental status examination











- Labs


CBC & Chem 7: 


                                 12/16/20 04:05





                                 12/16/20 04:05


Labs: 


                  Abnormal Lab Results - Last 24 Hours (Table)











  12/15/20 12/15/20 12/15/20 Range/Units





  13:52 15:45 21:10 


 


WBC     (3.8-10.6)  k/uL


 


RBC     (4.30-5.90)  m/uL


 


Neutrophils #     (1.3-7.7)  k/uL


 


Lymphocytes #     (1.0-4.8)  k/uL


 


D-Dimer     (<0.60)  mg/L FEU


 


Sodium     (137-145)  mmol/L


 


BUN     (9-20)  mg/dL


 


Glucose     (74-99)  mg/dL


 


POC Glucose (mg/dL)  211 H  178 H  280 H  (75-99)  mg/dL


 


Calcium     (8.4-10.2)  mg/dL


 


Ferritin     (22.0-322.0)  ng/mL


 


ALT     (4-49)  U/L


 


Lactate Dehydrogenase     (313-618)  U/L


 


C-Reactive Protein     (<10.0)  mg/L


 


Total Protein     (6.3-8.2)  g/dL


 


Albumin     (3.5-5.0)  g/dL














  12/15/20 12/16/20 12/16/20 Range/Units





  23:40 04:05 04:05 


 


WBC   13.4 H   (3.8-10.6)  k/uL


 


RBC   4.27 L   (4.30-5.90)  m/uL


 


Neutrophils #   12.5 H   (1.3-7.7)  k/uL


 


Lymphocytes #   0.4 L   (1.0-4.8)  k/uL


 


D-Dimer     (<0.60)  mg/L FEU


 


Sodium    135 L  (137-145)  mmol/L


 


BUN    24 H  (9-20)  mg/dL


 


Glucose    163 H  (74-99)  mg/dL


 


POC Glucose (mg/dL)  291 H    (75-99)  mg/dL


 


Calcium    8.1 L  (8.4-10.2)  mg/dL


 


Ferritin    1019.2 H  (22.0-322.0)  ng/mL


 


ALT    55 H  (4-49)  U/L


 


Lactate Dehydrogenase    1456 H  (313-618)  U/L


 


C-Reactive Protein    48.0 H  (<10.0)  mg/L


 


Total Protein    5.3 L  (6.3-8.2)  g/dL


 


Albumin    2.5 L  (3.5-5.0)  g/dL














  12/16/20 12/16/20 12/16/20 Range/Units





  04:05 04:48 09:39 


 


WBC     (3.8-10.6)  k/uL


 


RBC     (4.30-5.90)  m/uL


 


Neutrophils #     (1.3-7.7)  k/uL


 


Lymphocytes #     (1.0-4.8)  k/uL


 


D-Dimer  14.01 H    (<0.60)  mg/L FEU


 


Sodium     (137-145)  mmol/L


 


BUN     (9-20)  mg/dL


 


Glucose     (74-99)  mg/dL


 


POC Glucose (mg/dL)   147 H  193 H  (75-99)  mg/dL


 


Calcium     (8.4-10.2)  mg/dL


 


Ferritin     (22.0-322.0)  ng/mL


 


ALT     (4-49)  U/L


 


Lactate Dehydrogenase     (313-618)  U/L


 


C-Reactive Protein     (<10.0)  mg/L


 


Total Protein     (6.3-8.2)  g/dL


 


Albumin     (3.5-5.0)  g/dL














  12/16/20 Range/Units





  11:27 


 


WBC   (3.8-10.6)  k/uL


 


RBC   (4.30-5.90)  m/uL


 


Neutrophils #   (1.3-7.7)  k/uL


 


Lymphocytes #   (1.0-4.8)  k/uL


 


D-Dimer   (<0.60)  mg/L FEU


 


Sodium   (137-145)  mmol/L


 


BUN   (9-20)  mg/dL


 


Glucose   (74-99)  mg/dL


 


POC Glucose (mg/dL)  321 H  (75-99)  mg/dL


 


Calcium   (8.4-10.2)  mg/dL


 


Ferritin   (22.0-322.0)  ng/mL


 


ALT   (4-49)  U/L


 


Lactate Dehydrogenase   (313-618)  U/L


 


C-Reactive Protein   (<10.0)  mg/L


 


Total Protein   (6.3-8.2)  g/dL


 


Albumin   (3.5-5.0)  g/dL














Assessment and Plan


Assessment: 





Impression:


Acute hypoxic or short failure


Acute covid 19 pneumonitis.


New onset Atrial fibrillation with RVR.


New-onset diabetes.


Acute gastroenteritis secondary to Covid infection


Benign essential hypertension.


History of underlying coronary artery disease.


Right upper lobe pulmonary embolism, patient is on Xarelto.


Troponin leak.


History of gout.


History of right upper lobe nodule to be monitored on outpatient basis.





Recommendation:


Continue Decadron.


done with remdesivir


Titrate FiO2 accordingly..


Continue diuretics.


Continue oral beta blockers.


Continue Xarelto.


Continue GI and DVT prophylaxis.


Continue to monitor in the ICU, his pulmonary status is marginal at best.  At 

this point does not require intubation and mechanical ventilation but again he 

is quite marginal.


Prognosis is definitely poor and guarded, and his chest x-ray findings are very 

concerning.


Critical care time is over 30 minutes.





Time with Patient: Greater than 30

## 2020-12-17 LAB
ALBUMIN SERPL-MCNC: 2.4 G/DL (ref 3.5–5)
ALP SERPL-CCNC: 92 U/L (ref 38–126)
ALT SERPL-CCNC: 53 U/L (ref 4–49)
ANION GAP SERPL CALC-SCNC: 2 MMOL/L
AST SERPL-CCNC: 35 U/L (ref 17–59)
BASOPHILS # BLD AUTO: 0 K/UL (ref 0–0.2)
BASOPHILS NFR BLD AUTO: 0 %
BUN SERPL-SCNC: 23 MG/DL (ref 9–20)
CALCIUM SPEC-MCNC: 8 MG/DL (ref 8.4–10.2)
CHLORIDE SERPL-SCNC: 108 MMOL/L (ref 98–107)
CK SERPL-CCNC: 33 U/L (ref 55–170)
CO2 SERPL-SCNC: 25 MMOL/L (ref 22–30)
EOSINOPHIL # BLD AUTO: 0 K/UL (ref 0–0.7)
EOSINOPHIL NFR BLD AUTO: 0 %
ERYTHROCYTE [DISTWIDTH] IN BLOOD BY AUTOMATED COUNT: 4.28 M/UL (ref 4.3–5.9)
ERYTHROCYTE [DISTWIDTH] IN BLOOD: 13.6 % (ref 11.5–15.5)
FERRITIN SERPL-MCNC: 1054.3 NG/ML (ref 22–322)
GLUCOSE BLD-MCNC: 213 MG/DL (ref 75–99)
GLUCOSE BLD-MCNC: 223 MG/DL (ref 75–99)
GLUCOSE BLD-MCNC: 278 MG/DL (ref 75–99)
GLUCOSE BLD-MCNC: 335 MG/DL (ref 75–99)
GLUCOSE BLD-MCNC: 80 MG/DL (ref 75–99)
GLUCOSE SERPL-MCNC: 78 MG/DL (ref 74–99)
HCT VFR BLD AUTO: 39.5 % (ref 39–53)
HGB BLD-MCNC: 13.2 GM/DL (ref 13–17.5)
LDH SPEC-CCNC: 1475 U/L (ref 313–618)
LYMPHOCYTES # SPEC AUTO: 0.4 K/UL (ref 1–4.8)
LYMPHOCYTES NFR SPEC AUTO: 3 %
MCH RBC QN AUTO: 30.8 PG (ref 25–35)
MCHC RBC AUTO-ENTMCNC: 33.4 G/DL (ref 31–37)
MCV RBC AUTO: 92.2 FL (ref 80–100)
MONOCYTES # BLD AUTO: 0.3 K/UL (ref 0–1)
MONOCYTES NFR BLD AUTO: 2 %
NEUTROPHILS # BLD AUTO: 13.5 K/UL (ref 1.3–7.7)
NEUTROPHILS NFR BLD AUTO: 95 %
PLATELET # BLD AUTO: 154 K/UL (ref 150–450)
POTASSIUM SERPL-SCNC: 4 MMOL/L (ref 3.5–5.1)
PROT SERPL-MCNC: 5.1 G/DL (ref 6.3–8.2)
SODIUM SERPL-SCNC: 135 MMOL/L (ref 137–145)
WBC # BLD AUTO: 14.2 K/UL (ref 3.8–10.6)

## 2020-12-17 RX ADMIN — AMIODARONE HYDROCHLORIDE SCH MG: 200 TABLET ORAL at 11:53

## 2020-12-17 RX ADMIN — METOPROLOL TARTRATE SCH MG: 25 TABLET, FILM COATED ORAL at 11:53

## 2020-12-17 RX ADMIN — AMIODARONE HYDROCHLORIDE SCH MG: 200 TABLET ORAL at 21:23

## 2020-12-17 RX ADMIN — INSULIN ASPART SCH UNIT: 100 INJECTION, SOLUTION INTRAVENOUS; SUBCUTANEOUS at 16:33

## 2020-12-17 RX ADMIN — INSULIN ASPART SCH: 100 INJECTION, SOLUTION INTRAVENOUS; SUBCUTANEOUS at 00:24

## 2020-12-17 RX ADMIN — CEFAZOLIN SCH MLS/HR: 330 INJECTION, POWDER, FOR SOLUTION INTRAMUSCULAR; INTRAVENOUS at 12:32

## 2020-12-17 RX ADMIN — DEXTROSE SCH MG: 50 INJECTION, SOLUTION INTRAVENOUS at 11:53

## 2020-12-17 RX ADMIN — PANTOPRAZOLE SODIUM SCH MG: 40 TABLET, DELAYED RELEASE ORAL at 11:53

## 2020-12-17 RX ADMIN — METOPROLOL TARTRATE SCH MG: 25 TABLET, FILM COATED ORAL at 21:23

## 2020-12-17 RX ADMIN — INSULIN ASPART SCH UNIT: 100 INJECTION, SOLUTION INTRAVENOUS; SUBCUTANEOUS at 21:22

## 2020-12-17 RX ADMIN — OXYCODONE HYDROCHLORIDE AND ACETAMINOPHEN SCH MG: 500 TABLET ORAL at 11:53

## 2020-12-17 RX ADMIN — RIVAROXABAN SCH MG: 20 TABLET, FILM COATED ORAL at 16:33

## 2020-12-17 RX ADMIN — METOPROLOL TARTRATE SCH MG: 25 TABLET, FILM COATED ORAL at 16:33

## 2020-12-17 RX ADMIN — INSULIN ASPART SCH UNIT: 100 INJECTION, SOLUTION INTRAVENOUS; SUBCUTANEOUS at 12:28

## 2020-12-17 RX ADMIN — DILTIAZEM HYDROCHLORIDE SCH: 5 INJECTION INTRAVENOUS at 06:13

## 2020-12-17 RX ADMIN — DEXTROSE SCH MG: 50 INJECTION, SOLUTION INTRAVENOUS at 21:23

## 2020-12-17 RX ADMIN — INSULIN ASPART SCH: 100 INJECTION, SOLUTION INTRAVENOUS; SUBCUTANEOUS at 08:34

## 2020-12-17 RX ADMIN — INSULIN ASPART SCH: 100 INJECTION, SOLUTION INTRAVENOUS; SUBCUTANEOUS at 06:13

## 2020-12-17 RX ADMIN — Medication SCH MG: at 11:53

## 2020-12-17 NOTE — P.PN
Subjective


Progress Note Date: 12/17/20


Principal diagnosis: 


Acute hypoxic respiratory failure secondary to  COVID 19 pneumonia





On 12/13/2020, the patient got transferred to the intensive care unit overnight.

 I saw him in a medical floor and the patient was having acute hypoxic 

respiratory failure secondary to coronavirus/Covid 19 related pneumonia.  The 

patient was in the 100%.  After arriving to the intensive care unit, the patient

had to be placed on a BiPAP which is currently running at a pressure of 12/6 cm 

of water with an FiO2 of 20%.  His pulse ox currently is 96%.  Blood gases are 

pending from this morning.  He is resting comfortably on the BiPAP and he is 

able to tolerated without any major difficulties.  His cardiac rhythm has slowed

down.  He remains on atrial fibrillation.  He is on a Cardizem drip running at 

20 mg an hour.  He is also on metoprolol at a dose of 75 mg by mouth three a day

and amiodarone 400 mg by mouth twice a day.  Patient is on Xarelto.  D-dimer is 

elevated which is consistent with his coronavirus/Covid 19 related infection.  

His white cell count of 13.5.  His LDH level is at 904.  The proBNP level was 

3940 and his troponin was at 0.142.  I gave him a dose of Lasix yesterday.  His 

fluid balance over the past 24 hours has been -996 mL.  The chest x-ray still 

showing diffuse bilateral pulmonary infiltrates which is essentially unchanged 

compared to yesterday.  There are diffuse peripheral infiltrates bilaterally.





Reevaluated today on 12/14/20, patient is on BiPAP 12/6, FiO2 is 85%, patient 

seems to be comfortable, he is not in distress, asking to be fed, hence I will 

switch the patient to airvo, and continue to titrate accordingly to maintain O2 

saturation above 90% possible.  Patient remains on Cardizem drip for his atrial 

fibrillation, his rate is controlled, his IV fluid is a 0.9 normal saline.  CBC 

is relatively normal.  Inflammatory markers are elevated, LDH is 995 and his C-

reactive protein is 24.2.


*Normal renal profile is normal chest x-ray is quite abnormal showing diffuse 

interstitial infiltrates bilaterally.





Reevaluated today on 12/15/20, patient remains in the ICU, his pulmonary status 

is marginal at best.  Remains on high flow airvo, FiO2 of 85%, and 60 L flow.  

His O2 saturation is marginal between 90-93% at best.  Patient is on insulin 

drip mostly, 2 units per hour, his Cardizem is off, and he is now in sinus 

rhythm.  Patient completed his course of remdesivir, and he is on Decadron 6 mg 

every 12 hours, is also on Xarelto.  Patient is complaining of shortness of 

breath with any activity.  But looks comfortable at rest.  Remains on the Covid 

19 cocktail.  CBC showed leukocytosis with WBC count of 15.9 hemoglobin is 13.4.

 Basic metabolic profile and renal profile are normal





Reevaluated today on 12/16/20, remains in the ICU, patient is now on BiPAP, IPAP

is 12 EPAP 6 FiO2 is 85%, and his O2 saturation is 94%.  IV fluid is at 50 ML 

per hour, patient completed his treatment with remdesivir, remains on Decadron 6

mg IV push every 12 hours, and he remains on Xarelto.  He is also on the Covid 

19 cocktail.  His pulmonary status is marginal at best, patient will be given 

today a trial of high flow nasal cannula or airvo.  And will titrate 

accordingly.  Chest x-ray continues to show diffuse interstitial infiltrates.  

And some consolidation noted in the right lower lobe.  Inflammatory markers 

remain high LDH is 1456 C-reactive protein is 48.  Electrolytes are normal renal

profile is normal d-dimer is 14.01





On 12/17/2020 patient seen in follow-up in the intensive care unit, he still 

remains on Airvo at 60 L and FiO2 of 80%, his pulse ox is 93-97%, seems to be 

breathing comfortably, does not appear to be in any acute distress, his been 

afebrile overnight, hemodynamically has been stable, does have exertional dyspn

ea, the patient has been in bed for the most part.  No chest discomfort, today's

chest x-ray has been reviewed showing patchy diffuse infiltrates bilaterally.  

He remains on Decadron 6 mg twice daily, he is on Xarelto for anticoagulation, 

he is on 0.9 normal saline at a rate is 50 ML per hour, no other drips.  Patient

has completed a course of Remdesivir, remains on steroids, remains on oral antic

oagulation, today he was noted to have difficulty with right-sided weakness and 

facial droop, brain CT was completed showing atrophy with periventricular white 

matter ischemic changes, but no acute intracranial process.  No slurred speech, 

no difficulty swallowing.  Neurology has been consulted for neurologic 

evaluation, and right-sided weakness











Objective





- Vital Signs


Vital signs: 


                                   Vital Signs











Temp  97.7 F   12/17/20 12:00


 


Pulse  66   12/17/20 12:00


 


Resp  21   12/17/20 12:00


 


BP  146/87   12/17/20 12:00


 


Pulse Ox  97   12/17/20 12:00








                                 Intake & Output











 12/16/20 12/17/20 12/17/20





 18:59 06:59 18:59


 


Intake Total 600 600 300


 


Output Total 1550 1720 920


 


Balance -950 -1120 -620


 


Weight  94.8 kg 94.8 kg


 


Intake:   


 


   600 300


 


    Sodium Chloride 0.9% 1, 600 600 300





    000 ml @ 50 mls/hr IV .   





    Q20H STE[facility] Rx#:[id number]   


 


Output:   


 


  Urine 1550 1720 920


 


Other:   


 


  Voiding Method Urinal  Urinal


 


  # Bowel Movements   1














- Exam


 GENERAL EXAM: Alert, very pleasant, 70-year-old white male, resting in bed, 

awake and alert, oriented 3, he is currently on high flow oxygen per Airvo  at 

60 L, and FiO2 of 80%, with pulse ox of 93-97%, comfortable in no apparent 

distress.


HEAD: Normocephalic/atraumatic.


EYES: Normal reaction of pupils, equal size.  Conjunctiva pink, sclera white.


NOSE: Clear with pink turbinates.


THROAT: No erythema or exudates.


NECK: No masses, no JVD, no thyroid enlargement, no adenopathy.


CHEST: No chest wall deformity.  Symmetrical expansion. 


LUNGS: Equal air entry with bilateral crackles, no wheeze, rhonchi or dullness.


CVS: Regular rate and rhythm, normal S1 and S2, no gallops, no murmurs, no rubs


ABDOMEN: Soft, nontender.  No hepatosplenomegaly, normal bowel sounds, no 

guarding or rigidity.


EXTREMITIES: No clubbing, no edema, no cyanosis, 2+ pulses and upper and lower 

extremities.


MUSCULOSKELETAL: Muscle strength and tone normal.


SPINE: No scoliosis or deformity


SKIN: No rashes


CENTRAL NERVOUS SYSTEM: Alert and oriented -3.  No focal deficits, tone is 

normal in all 4 extremities.


PSYCHIATRIC: Alert and oriented -3.  Appropriate affect.  Intact judgment and 

insight.











- Labs


CBC & Chem 7: 


                                 12/17/20 03:55





                                 12/17/20 03:55


Labs: 


                  Abnormal Lab Results - Last 24 Hours (Table)











  12/16/20 12/16/20 12/16/20 Range/Units





  17:04 20:01 22:04 


 


WBC     (3.8-10.6)  k/uL


 


RBC     (4.30-5.90)  m/uL


 


Neutrophils #     (1.3-7.7)  k/uL


 


Lymphocytes #     (1.0-4.8)  k/uL


 


Sodium     (137-145)  mmol/L


 


Chloride     ()  mmol/L


 


BUN     (9-20)  mg/dL


 


POC Glucose (mg/dL)  236 H  250 H  238 H  (75-99)  mg/dL


 


Calcium     (8.4-10.2)  mg/dL


 


Ferritin     (22.0-322.0)  ng/mL


 


ALT     (4-49)  U/L


 


Lactate Dehydrogenase     (313-618)  U/L


 


Creatine Kinase     ()  U/L


 


C-Reactive Protein     (<10.0)  mg/L


 


Total Protein     (6.3-8.2)  g/dL


 


Albumin     (3.5-5.0)  g/dL














  12/16/20 12/17/20 12/17/20 Range/Units





  23:45 03:55 03:55 


 


WBC    14.2 H  (3.8-10.6)  k/uL


 


RBC    4.28 L  (4.30-5.90)  m/uL


 


Neutrophils #    13.5 H  (1.3-7.7)  k/uL


 


Lymphocytes #    0.4 L  (1.0-4.8)  k/uL


 


Sodium   135 L   (137-145)  mmol/L


 


Chloride   108 H   ()  mmol/L


 


BUN   23 H   (9-20)  mg/dL


 


POC Glucose (mg/dL)  113 H    (75-99)  mg/dL


 


Calcium   8.0 L   (8.4-10.2)  mg/dL


 


Ferritin   1054.3 H   (22.0-322.0)  ng/mL


 


ALT   53 H   (4-49)  U/L


 


Lactate Dehydrogenase   1475 H   (313-618)  U/L


 


Creatine Kinase   33 L   ()  U/L


 


C-Reactive Protein   35.0 H   (<10.0)  mg/L


 


Total Protein   5.1 L   (6.3-8.2)  g/dL


 


Albumin   2.4 L   (3.5-5.0)  g/dL














  12/17/20 Range/Units





  12:03 


 


WBC   (3.8-10.6)  k/uL


 


RBC   (4.30-5.90)  m/uL


 


Neutrophils #   (1.3-7.7)  k/uL


 


Lymphocytes #   (1.0-4.8)  k/uL


 


Sodium   (137-145)  mmol/L


 


Chloride   ()  mmol/L


 


BUN   (9-20)  mg/dL


 


POC Glucose (mg/dL)  223 H  (75-99)  mg/dL


 


Calcium   (8.4-10.2)  mg/dL


 


Ferritin   (22.0-322.0)  ng/mL


 


ALT   (4-49)  U/L


 


Lactate Dehydrogenase   (313-618)  U/L


 


Creatine Kinase   ()  U/L


 


C-Reactive Protein   (<10.0)  mg/L


 


Total Protein   (6.3-8.2)  g/dL


 


Albumin   (3.5-5.0)  g/dL














Assessment and Plan


Plan: 


 Assessment:





#1.  Acute hypoxic respiratory failure related to acute COVID 19 pneumonitis





#2.  New onset atrial fibrillation with RVR, in sinus rhythm





#3.  New onset diabetes mellitus





#4.  Acute gastroenteritis secondary to COVID 19 infection





#5.  Benign essential hypertension





#6.  History of underlying coronary artery disease





#7.  Right upper lobe pulmonary embolism, and patient is on Xarelto





#8.  Troponin leak





#9.  History of gout





#10.  History of right upper lobe nodule to be monitored on an outpatient basis





#11.  New onset right-sided right arm weakness, a CT brain showed no acute 

intracranial process, neurology evaluation pending





Plan:





Continue current dose steroids, continue supplements including vitamin C, 

vitamin D, zinc supplement, continue oral anticoagulation, brain CT has been 

reviewed, neurology evaluation is pending.  Pulmonary perspective patient 

although still requiring high flow oxygen has maintained stable O2 saturations 

over 92% over the last 24 hours, breathing comfortably, no worsening pulmonary 

symptoms, no chest pain no fever or chills, he can be transferred to ICU to a 

monitor bed on selective care unit.  Wean FiO2 to keep O2 sat at 90-91%





I performed a history & physical examination of the patient and discussed their 

management with my nurse practitioner, Peggy Tai.  I reviewed the nurse 

practitioner's note and agree with the documented findings and plan of care.  

Lung sounds are positive for diffuse wheezes throughout the lung fields.  The 

findings and the impression was discussed with the patient.  I attest to the 

documentation by the nurse practitioner. 











Time with Patient: Less than 30

## 2020-12-17 NOTE — XR
EXAMINATION TYPE: XR chest 1V portable

 

DATE OF EXAM: 12/17/2020

 

COMPARISON: 12/16/2020

 

INDICATION: Short of breath

 

TECHNIQUE: Single frontal view of the chest is obtained.

 

FINDINGS:  

The heart size is normal.  

The pulmonary vasculature is normal.  

Diffuse patchy infiltrates are present, similar to prior.  

 

 

IMPRESSION:  

1. Patchy diffuse infiltrates present bilaterally can be compatible with atypical pneumonia.

## 2020-12-17 NOTE — CT
EXAMINATION TYPE: CT angio head neck

 

DATE OF EXAM: 12/17/2020

 

COMPARISON:

 

INDICATION: Right sided weakness and facial droop.

 

DLP: 1964.3 mGycm, Automated exposure control for dose reduction was used.

 

CONTRAST: None

 

CT of the brain is performed utilizing 3 mm thick sections through the posterior fossa and 3 mm thick
 sections through the remaining calvarium.  Study is not performed within 24 hours of arrival to the 
hospital. 

 

No abnormal hyperdensity is present to suggest an acute intracranial hemorrhage.

No mass lesion is evident.

No acute infarcts are evident. Scattered periventricular white matter hypodensity is present, likely 
on the basis of chronic white matter ischemic changes.

Ventricles and sulci are prominent for the patient age. Some more focal atrophy may be within the rig
ht occipital lobe with prominence of sulci. 

Paranasal sinuses and mastoid air cells within the field-of-view are clear.

 

IMPRESSIONS:

1.   Atrophy with periventricular white matter ischemic changes.

2. No acute intracranial process.

 

EXAMINATION TYPE: CT angio head neck

 

DATE OF EXAM: 12/17/2020

 

HISTORY: Right sided weakness and facial droop.

 

COMPARISON: None

 

CT DLP: 1964.3 mGycm.  Automated Exposure Control for Dose Reduction was Utilized.

 

TECHNIQUE:  CTA scan of the neck is performed without and with IV Contrast, patient injected with 65 
mL of Isovue 370, axial images are obtained, coronal and sagittal reformatted images are reviewed. Th
ree-D reconstructed images are created on an independent workstation and reviewed.  Source images are
 reviewed.

 

FINDINGS:

 

Carotid/Vascular Structures: Carotid arteries bifurcate normally into internal and external carotid a
rteries. There is some minimal atheromatous plaquing at the carotid bifurcations without significant 
flow-limiting stenosis.

 

Cervical of [place]: Vertebral basilar system appears normal. Posterior cerebral vasculature is unrema
rkable. Internal carotid arteries bifurcate normally into A1 and M1 segments. A2 segments are normal.
 The anterior communicating artery is patent. Communicating arteries are not identified.

 

IMPRESSION:

1. No flow-limiting stenosis bilateral carotid bifurcations. Minimal atheromatous calcification is pr
esent.

2. Normal [place] of [place]

## 2020-12-17 NOTE — PN
PROGRESS NOTE



DATE OF SERVICE:

12/17/2020



REASON FOR FOLLOWUP:

COVID-19 pneumonia.



INTERVAL HISTORY:

The patient is currently afebrile; has been breathing slightly comfortably; still

requiring high-flow nasal cannula oxygen.  Denies having any chest pain.  Minimal

cough.  No vomiting.  No abdominal pain or diarrhea.



PHYSICAL EXAMINATION:

Blood pressure 148/87 with a pulse of 64, temperature 97.8.  He is 92% on 60% FiO2.

General description is an elderly male lying in bed in no distress.

RESPIRATORY SYSTEM: Unlabored breathing. Coarse breath sounds at the bases bilaterally.

HEART: S1, S2.  Regular rate and rhythm.

ABDOMEN: Soft. No tenderness.



LABS:

Hemoglobin is 13.2, white count 14.2, BUN of 23, creatinine 0.73.



DIAGNOSTIC IMPRESSION AND PLAN:

Patient with acute COVID-19 pneumonia in this patient who completed his remdesivir

therapy. Patient is currently on dexamethasone, Xarelto, zinc; to continue along with

respiratory support and monitor his clinical course closely.





MMODL / IJN: [id number] / Job#: [id number]

## 2020-12-17 NOTE — P.CNNES
History of Present Illness


Consult date: 12/17/20


Requesting physician: Jenny Restrepo


Reason for Consult: Possible CVA.


History of Present Illness: 





Patient is a 70-year-old male, who came to the hospital on 12/05/2020 with 1 

week history of fatigue and generalized weakness.  He had poor appetite, with 

blood sugars over 400 by EMS.   Patient was diagnosed with new onset 

uncontrolled diabetes, and acute COVID infection, patient's hemoglobin A1c 14.0.

 Patient was being managed for multiple medical issues.  At 10:15 AM patient was

noted to have right arm weakness, using left hand to move his right arm.  His 

right arm was definitely weaker for squeezing compared to the left.  His speech 

was clear.  Right leg showed no deficit.  Patient underwent stat CTA of the head

and neck, which revealed no flow limiting stenosis bilateral carotid 

bifurcations.  Minimal atherosclerotic calcification is present.  Normal [place] 

of [place].  Patient was not a candidate for TPA as he was already on Xarelto.  

Exact timing of onset of symptoms was uncertain.





Patient tells me that his symptoms started yesterday with right arm weakness.  

He also noticed that his right leg is also weak but not as compared to the right

arm.  Denies any slurred speech or problem with the vision.  Patient states that

when he is laying certain ways, he does feel pain across the shoulder on the 

right.  Denies any vertigo.





Patient has history of smoking 1-2 pack per day for 15-20 years, quit 20 years 

ago.  Denies any alcohol.  Patient has hypertension and hyperlipidemia with new 

onset diabetes.  No previous history of strokes.





Review of Systems





Patient complains of generalized fatigue, shortness of breath, right-sided 

weakness.  Denies headache.  Denies double vision, loss of vision.  Denies 

abdominal pain at this time.  Does have some shortness of breath.  Complains of 

some neck and right shoulder pain.  All other review of systems noncontributory 

to present illness.





Past Medical History


Past Medical History: Hypertension, Myocardial Infarction (MI), Pulmonary 

Embolus (PE)


Additional Past Medical History / Comment(s): Gout, right upper lobe lung nodule

being monitored in the outpatient setting, left upper lobe pulmonary embolism in

July 2020, on Xarelto


Last Myocardial Infarction Date:: 7\21\2014


History of Any Multi-Drug Resistant Organisms: None Reported


Past Surgical History: Appendectomy


Additional Past Surgical History / Comment(s): pt states appendectomy 30-40 

years ago.  pt states he doesn't know when his last MI was, because he was in 

longterm at the time he also said it was about 6 years ago.


Past Psychological History: No Psychological Hx Reported


Smoking Status: Former smoker


Past Alcohol Use History: None Reported


Past Drug Use History: None Reported





Medications and Allergies


                                Home Medications











 Medication  Instructions  Recorded  Confirmed  Type


 


Allopurinol [Zyloprim] 300 mg PO DAILY 07/21/20 12/05/20 History


 


amLODIPine [Norvasc] 5 mg PO BID 07/21/20 12/05/20 History


 


Metoprolol Succinate [Toprol XL] 50 mg PO DAILY 12/05/20 12/05/20 History


 


Naproxen Sodium [Naprelan] 500 mg PO BID PRN 12/05/20 12/05/20 History


 


Omeprazole 20 mg PO DAILY 12/05/20 12/05/20 History


 


Rivaroxaban [Xarelto] 20 mg PO DAILY 12/05/20 12/05/20 History








                                    Allergies











Allergy/AdvReac Type Severity Reaction Status Date / Time


 


No Known Allergies Allergy   Verified 12/05/20 15:10














Physical Examination





- Vital Signs


Vital Signs: 


                                   Vital Signs











  Temp Pulse Pulse Resp BP BP Pulse Ox


 


 12/17/20 16:00  98.1 F   63  18   138/82  94 L


 


 12/17/20 15:07        92 L


 


 12/17/20 12:00  97.7 F  66   21  146/87   97


 


 12/17/20 11:00   67   20  146/87   97


 


 12/17/20 10:00   65   20  127/70   93 L


 


 12/17/20 09:00   55 L   21  153/83   95


 


 12/17/20 08:00  98.1 F  63   30 H  137/83   92 L


 


 12/17/20 07:00   57 L   19  133/80   97


 


 12/17/20 06:00   56 L   10 L  134/81   98


 


 12/17/20 05:00   58 L   20  142/90   95


 


 12/17/20 04:00  97.8 F  59 L   24  124/72   97


 


 12/17/20 03:00   54 L   22  130/80   96


 


 12/17/20 02:00   55 L   21  135/81   97


 


 12/17/20 01:00   56 L   21  140/93   97


 


 12/17/20 00:00   61   22  143/86   93 L


 


 12/16/20 23:00  98 F  59 L   21  155/91   93 L


 


 12/16/20 22:00   62   13  139/80   92 L


 


 12/16/20 21:00   60   24  133/77   97


 


 12/16/20 20:00  97.8 F  60   18  140/126   97


 


 12/16/20 19:00   60   16  135/75   95








                                Intake and Output











 12/17/20 12/17/20 12/17/20





 06:59 14:59 22:59


 


Intake Total 400 300 236


 


Output Total 900 920 


 


Balance -500 -620 236


 


Intake:   


 


   300 


 


    Sodium Chloride 0.9% 1, 400 300 





    000 ml @ 50 mls/hr IV .   





    Q20H STEPH Rx#:[id number]   


 


  Oral   236


 


Output:   


 


  Urine 900 920 


 


Other:   


 


  Voiding Method  Urinal Urinal


 


  # Voids  1 1


 


  # Bowel Movements  1 


 


  Weight 94.8 kg 94.8 kg 














On examination patient is an elderly  male, in mild respiratory 

distress, mildly tachypneic.  He has Airvo in place.  Patient is otherwise alert

 and awake fully oriented.  Speech and language functions are normal.  

Attention, concentration fund of knowledge is adequate.  On cranial examination 

pupils are round and reactive to light, visual fields are full on confrontation,

 extraocular muscles are intact with no nystagmus.  Patient has right facial 

asymmetry.  Tongue protrudes the midline.  Palate was not checked because of 

acute COVID infection.  Facial sensation normal.  On muscle strength testing 

patient has right pronator drift.  The strength is normal in left arm and left 

leg.  On the right side, deltoid is 3+, biceps 4+ to 5-, triceps is 5,  is 

4.  Hip flexion is 4+, ankle dorsiflexion 4+ to 5-.  Sensory touch is equal.  

Patient is ataxic for finger-to-nose testing on the right.  Sensory to touch 

shows no neglect on either side.  Bulk of muscles is normal.  Reflexes are 

diminished and plantar is upgoing on the right, down on the left.





Results





- Laboratory Findings


CBC and BMP: 


                                 12/17/20 03:55





                                 12/17/20 03:55


Abnormal Lab Findings: 


                                  Abnormal Labs











  12/05/20 12/05/20 12/05/20





  13:01 13:35 13:35


 


WBC   


 


RBC   


 


Hgb   


 


Hct   


 


Plt Count   


 


Neutrophils #   


 


Lymphocytes #   0.7 L 


 


Fibrinogen   


 


D-Dimer   


 


ABG pH   


 


ABG pCO2   


 


ABG pO2   


 


ABG HCO3   


 


ABG Total CO2   


 


ABG O2 Saturation   


 


Sodium   


 


Chloride   


 


Carbon Dioxide   


 


Anion Gap   


 


BUN   


 


Est GFR (CKD-EPI)NonAf   


 


BUN/Creatinine Ratio   


 


Glucose   


 


POC Glucose (mg/dL)  343 H  


 


Hemoglobin A1c   


 


Calcium   


 


Magnesium   


 


Ferritin   


 


AST   


 


ALT   


 


Lactate Dehydrogenase   


 


Creatine Kinase   


 


Troponin I   


 


C-Reactive Protein   


 


Total Protein   


 


Albumin   


 


Procalcitonin   


 


TSH   


 


Ur Specific Gravity    1.038 H


 


Urine Protein    1+ H


 


Urine Glucose (UA)    4+ H


 


Urine Ketones    2+ H


 


Urine Blood    Trace H


 


Urine Mucus    Rare H


 


Coronavirus (PCR)   














  12/05/20 12/05/20 12/05/20





  13:35 13:35 13:35


 


WBC   


 


RBC   


 


Hgb   


 


Hct   


 


Plt Count   


 


Neutrophils #   


 


Lymphocytes #   


 


Fibrinogen   


 


D-Dimer   


 


ABG pH   


 


ABG pCO2   


 


ABG pO2   


 


ABG HCO3   


 


ABG Total CO2   


 


ABG O2 Saturation   


 


Sodium  131 L  


 


Chloride   


 


Carbon Dioxide  20 L  


 


Anion Gap   


 


BUN   


 


Est GFR (CKD-EPI)NonAf   


 


BUN/Creatinine Ratio   


 


Glucose  331 H  


 


POC Glucose (mg/dL)   


 


Hemoglobin A1c    14.0 H


 


Calcium  7.5 L  


 


Magnesium  1.5 L  


 


Ferritin   


 


AST   


 


ALT   


 


Lactate Dehydrogenase   


 


Creatine Kinase   


 


Troponin I   


 


C-Reactive Protein   59.0 H 


 


Total Protein  6.1 L  


 


Albumin  3.3 L  


 


Procalcitonin   


 


TSH   


 


Ur Specific Gravity   


 


Urine Protein   


 


Urine Glucose (UA)   


 


Urine Ketones   


 


Urine Blood   


 


Urine Mucus   


 


Coronavirus (PCR)   














  12/05/20 12/06/20 12/06/20





  14:06 07:19 11:47


 


WBC   


 


RBC   


 


Hgb   


 


Hct   


 


Plt Count   


 


Neutrophils #   


 


Lymphocytes #   


 


Fibrinogen   


 


D-Dimer   


 


ABG pH   


 


ABG pCO2   


 


ABG pO2   


 


ABG HCO3   


 


ABG Total CO2   


 


ABG O2 Saturation   


 


Sodium   


 


Chloride   


 


Carbon Dioxide   


 


Anion Gap   


 


BUN   


 


Est GFR (CKD-EPI)NonAf   


 


BUN/Creatinine Ratio   


 


Glucose   


 


POC Glucose (mg/dL)   248 H  223 H


 


Hemoglobin A1c   


 


Calcium   


 


Magnesium   


 


Ferritin   


 


AST   


 


ALT   


 


Lactate Dehydrogenase   


 


Creatine Kinase   


 


Troponin I   


 


C-Reactive Protein   


 


Total Protein   


 


Albumin   


 


Procalcitonin   


 


TSH   


 


Ur Specific Gravity   


 


Urine Protein   


 


Urine Glucose (UA)   


 


Urine Ketones   


 


Urine Blood   


 


Urine Mucus   


 


Coronavirus (PCR)  Detected A  














  12/06/20 12/06/20 12/07/20





  16:46 20:04 06:51


 


WBC    2.8 L


 


RBC   


 


Hgb   


 


Hct   


 


Plt Count   


 


Neutrophils #   


 


Lymphocytes #    0.5 L


 


Fibrinogen   


 


D-Dimer   


 


ABG pH   


 


ABG pCO2   


 


ABG pO2   


 


ABG HCO3   


 


ABG Total CO2   


 


ABG O2 Saturation   


 


Sodium   


 


Chloride   


 


Carbon Dioxide   


 


Anion Gap   


 


BUN   


 


Est GFR (CKD-EPI)NonAf   


 


BUN/Creatinine Ratio   


 


Glucose   


 


POC Glucose (mg/dL)  252 H  289 H 


 


Hemoglobin A1c   


 


Calcium   


 


Magnesium   


 


Ferritin   


 


AST   


 


ALT   


 


Lactate Dehydrogenase   


 


Creatine Kinase   


 


Troponin I   


 


C-Reactive Protein   


 


Total Protein   


 


Albumin   


 


Procalcitonin   


 


TSH   


 


Ur Specific Gravity   


 


Urine Protein   


 


Urine Glucose (UA)   


 


Urine Ketones   


 


Urine Blood   


 


Urine Mucus   


 


Coronavirus (PCR)   














  12/07/20 12/07/20 12/07/20





  06:51 07:16 11:10


 


WBC   


 


RBC   


 


Hgb   


 


Hct   


 


Plt Count   


 


Neutrophils #   


 


Lymphocytes #   


 


Fibrinogen   


 


D-Dimer   


 


ABG pH   


 


ABG pCO2   


 


ABG pO2   


 


ABG HCO3   


 


ABG Total CO2   


 


ABG O2 Saturation   


 


Sodium   


 


Chloride   


 


Carbon Dioxide   


 


Anion Gap   


 


BUN   


 


Est GFR (CKD-EPI)NonAf   


 


BUN/Creatinine Ratio  23.00 H  


 


Glucose  288 H  


 


POC Glucose (mg/dL)   295 H  277 H


 


Hemoglobin A1c   


 


Calcium   


 


Magnesium   


 


Ferritin   


 


AST   


 


ALT   


 


Lactate Dehydrogenase   


 


Creatine Kinase   


 


Troponin I   


 


C-Reactive Protein  7.5 H  


 


Total Protein   


 


Albumin   


 


Procalcitonin   


 


TSH   


 


Ur Specific Gravity   


 


Urine Protein   


 


Urine Glucose (UA)   


 


Urine Ketones   


 


Urine Blood   


 


Urine Mucus   


 


Coronavirus (PCR)   














  12/07/20 12/07/20 12/08/20





  16:22 20:21 07:22


 


WBC   


 


RBC   


 


Hgb   


 


Hct   


 


Plt Count   


 


Neutrophils #   


 


Lymphocytes #   


 


Fibrinogen   


 


D-Dimer   


 


ABG pH   


 


ABG pCO2   


 


ABG pO2   


 


ABG HCO3   


 


ABG Total CO2   


 


ABG O2 Saturation   


 


Sodium   


 


Chloride   


 


Carbon Dioxide   


 


Anion Gap   


 


BUN   


 


Est GFR (CKD-EPI)NonAf   


 


BUN/Creatinine Ratio   


 


Glucose   


 


POC Glucose (mg/dL)  286 H  266 H  243 H


 


Hemoglobin A1c   


 


Calcium   


 


Magnesium   


 


Ferritin   


 


AST   


 


ALT   


 


Lactate Dehydrogenase   


 


Creatine Kinase   


 


Troponin I   


 


C-Reactive Protein   


 


Total Protein   


 


Albumin   


 


Procalcitonin   


 


TSH   


 


Ur Specific Gravity   


 


Urine Protein   


 


Urine Glucose (UA)   


 


Urine Ketones   


 


Urine Blood   


 


Urine Mucus   


 


Coronavirus (PCR)   














  12/08/20 12/08/20 12/08/20





  11:56 16:54 20:39


 


WBC   


 


RBC   


 


Hgb   


 


Hct   


 


Plt Count   


 


Neutrophils #   


 


Lymphocytes #   


 


Fibrinogen   


 


D-Dimer   


 


ABG pH   


 


ABG pCO2   


 


ABG pO2   


 


ABG HCO3   


 


ABG Total CO2   


 


ABG O2 Saturation   


 


Sodium   


 


Chloride   


 


Carbon Dioxide   


 


Anion Gap   


 


BUN   


 


Est GFR (CKD-EPI)NonAf   


 


BUN/Creatinine Ratio   


 


Glucose   


 


POC Glucose (mg/dL)  229 H  244 H  230 H


 


Hemoglobin A1c   


 


Calcium   


 


Magnesium   


 


Ferritin   


 


AST   


 


ALT   


 


Lactate Dehydrogenase   


 


Creatine Kinase   


 


Troponin I   


 


C-Reactive Protein   


 


Total Protein   


 


Albumin   


 


Procalcitonin   


 


TSH   


 


Ur Specific Gravity   


 


Urine Protein   


 


Urine Glucose (UA)   


 


Urine Ketones   


 


Urine Blood   


 


Urine Mucus   


 


Coronavirus (PCR)   














  12/09/20 12/09/20 12/09/20





  07:02 07:19 07:19


 


WBC   


 


RBC   


 


Hgb   


 


Hct   


 


Plt Count   


 


Neutrophils #   


 


Lymphocytes #    0.7 L


 


Fibrinogen   


 


D-Dimer   


 


ABG pH   


 


ABG pCO2   


 


ABG pO2   


 


ABG HCO3   


 


ABG Total CO2   


 


ABG O2 Saturation   


 


Sodium   


 


Chloride   


 


Carbon Dioxide   


 


Anion Gap   


 


BUN   


 


Est GFR (CKD-EPI)NonAf   


 


BUN/Creatinine Ratio   


 


Glucose   


 


POC Glucose (mg/dL)  325 H  


 


Hemoglobin A1c   


 


Calcium   


 


Magnesium   


 


Ferritin   


 


AST   


 


ALT   


 


Lactate Dehydrogenase   


 


Creatine Kinase   


 


Troponin I   


 


C-Reactive Protein   


 


Total Protein   


 


Albumin   


 


Procalcitonin   0.27 H 


 


TSH   


 


Ur Specific Gravity   


 


Urine Protein   


 


Urine Glucose (UA)   


 


Urine Ketones   


 


Urine Blood   


 


Urine Mucus   


 


Coronavirus (PCR)   














  12/09/20 12/09/20 12/09/20





  07:19 07:19 11:41


 


WBC   


 


RBC   


 


Hgb   


 


Hct   


 


Plt Count   


 


Neutrophils #   


 


Lymphocytes #   


 


Fibrinogen   


 


D-Dimer  0.88 H  


 


ABG pH   


 


ABG pCO2   


 


ABG pO2   


 


ABG HCO3   


 


ABG Total CO2   


 


ABG O2 Saturation   


 


Sodium   


 


Chloride   


 


Carbon Dioxide   17.9 L 


 


Anion Gap   17.10 H 


 


BUN   


 


Est GFR (CKD-EPI)NonAf   55.3 L 


 


BUN/Creatinine Ratio   


 


Glucose   354 H 


 


POC Glucose (mg/dL)    341 H


 


Hemoglobin A1c   


 


Calcium   


 


Magnesium   


 


Ferritin   


 


AST   54 H 


 


ALT   55 H 


 


Lactate Dehydrogenase   364 H 


 


Creatine Kinase   


 


Troponin I   


 


C-Reactive Protein   12.5 H 


 


Total Protein   


 


Albumin   


 


Procalcitonin   


 


TSH   


 


Ur Specific Gravity   


 


Urine Protein   


 


Urine Glucose (UA)   


 


Urine Ketones   


 


Urine Blood   


 


Urine Mucus   


 


Coronavirus (PCR)   














  12/09/20 12/09/20 12/09/20





  17:03 20:41 23:29


 


WBC   


 


RBC   


 


Hgb   


 


Hct   


 


Plt Count   


 


Neutrophils #   


 


Lymphocytes #   


 


Fibrinogen   


 


D-Dimer   


 


ABG pH   


 


ABG pCO2   


 


ABG pO2   


 


ABG HCO3   


 


ABG Total CO2   


 


ABG O2 Saturation   


 


Sodium   


 


Chloride   


 


Carbon Dioxide   


 


Anion Gap   


 


BUN   


 


Est GFR (CKD-EPI)NonAf   


 


BUN/Creatinine Ratio   


 


Glucose   


 


POC Glucose (mg/dL)  336 H  475 H  337 H


 


Hemoglobin A1c   


 


Calcium   


 


Magnesium   


 


Ferritin   


 


AST   


 


ALT   


 


Lactate Dehydrogenase   


 


Creatine Kinase   


 


Troponin I   


 


C-Reactive Protein   


 


Total Protein   


 


Albumin   


 


Procalcitonin   


 


TSH   


 


Ur Specific Gravity   


 


Urine Protein   


 


Urine Glucose (UA)   


 


Urine Ketones   


 


Urine Blood   


 


Urine Mucus   


 


Coronavirus (PCR)   














  12/10/20 12/10/20 12/10/20





  07:11 11:55 16:54


 


WBC   


 


RBC   


 


Hgb   


 


Hct   


 


Plt Count   


 


Neutrophils #   


 


Lymphocytes #   


 


Fibrinogen   


 


D-Dimer   


 


ABG pH   


 


ABG pCO2   


 


ABG pO2   


 


ABG HCO3   


 


ABG Total CO2   


 


ABG O2 Saturation   


 


Sodium   


 


Chloride   


 


Carbon Dioxide   


 


Anion Gap   


 


BUN   


 


Est GFR (CKD-EPI)NonAf   


 


BUN/Creatinine Ratio   


 


Glucose   


 


POC Glucose (mg/dL)  283 H  285 H  331 H


 


Hemoglobin A1c   


 


Calcium   


 


Magnesium   


 


Ferritin   


 


AST   


 


ALT   


 


Lactate Dehydrogenase   


 


Creatine Kinase   


 


Troponin I   


 


C-Reactive Protein   


 


Total Protein   


 


Albumin   


 


Procalcitonin   


 


TSH   


 


Ur Specific Gravity   


 


Urine Protein   


 


Urine Glucose (UA)   


 


Urine Ketones   


 


Urine Blood   


 


Urine Mucus   


 


Coronavirus (PCR)   














  12/10/20 12/11/20 12/11/20





  20:34 06:55 06:55


 


WBC   11.6 H 


 


RBC   


 


Hgb   


 


Hct   


 


Plt Count   


 


Neutrophils #   10.1 H 


 


Lymphocytes #   0.7 L 


 


Fibrinogen   


 


D-Dimer    0.63 H


 


ABG pH   


 


ABG pCO2   


 


ABG pO2   


 


ABG HCO3   


 


ABG Total CO2   


 


ABG O2 Saturation   


 


Sodium   


 


Chloride   


 


Carbon Dioxide   


 


Anion Gap   


 


BUN   


 


Est GFR (CKD-EPI)NonAf   


 


BUN/Creatinine Ratio   


 


Glucose   


 


POC Glucose (mg/dL)  230 H  


 


Hemoglobin A1c   


 


Calcium   


 


Magnesium   


 


Ferritin   


 


AST   


 


ALT   


 


Lactate Dehydrogenase   


 


Creatine Kinase   


 


Troponin I   


 


C-Reactive Protein   


 


Total Protein   


 


Albumin   


 


Procalcitonin   


 


TSH   


 


Ur Specific Gravity   


 


Urine Protein   


 


Urine Glucose (UA)   


 


Urine Ketones   


 


Urine Blood   


 


Urine Mucus   


 


Coronavirus (PCR)   














  12/11/20 12/11/20 12/11/20





  06:55 06:57 11:31


 


WBC   


 


RBC   


 


Hgb   


 


Hct   


 


Plt Count   


 


Neutrophils #   


 


Lymphocytes #   


 


Fibrinogen   


 


D-Dimer   


 


ABG pH   


 


ABG pCO2   


 


ABG pO2   


 


ABG HCO3   


 


ABG Total CO2   


 


ABG O2 Saturation   


 


Sodium   


 


Chloride   


 


Carbon Dioxide  21.1 L  


 


Anion Gap   


 


BUN   


 


Est GFR (CKD-EPI)NonAf   


 


BUN/Creatinine Ratio  23.64 H  


 


Glucose  186 H  


 


POC Glucose (mg/dL)   168 H  204 H


 


Hemoglobin A1c   


 


Calcium   


 


Magnesium   


 


Ferritin   


 


AST   


 


ALT   


 


Lactate Dehydrogenase  314 H  


 


Creatine Kinase   


 


Troponin I   


 


C-Reactive Protein  3.2 H  


 


Total Protein   


 


Albumin   


 


Procalcitonin   


 


TSH   


 


Ur Specific Gravity   


 


Urine Protein   


 


Urine Glucose (UA)   


 


Urine Ketones   


 


Urine Blood   


 


Urine Mucus   


 


Coronavirus (PCR)   














  12/11/20 12/11/20 12/12/20





  16:31 20:52 06:10


 


WBC   


 


RBC   


 


Hgb   


 


Hct   


 


Plt Count   


 


Neutrophils #   


 


Lymphocytes #   


 


Fibrinogen   


 


D-Dimer   


 


ABG pH   


 


ABG pCO2   


 


ABG pO2   


 


ABG HCO3   


 


ABG Total CO2   


 


ABG O2 Saturation   


 


Sodium   


 


Chloride   


 


Carbon Dioxide   


 


Anion Gap   


 


BUN   


 


Est GFR (CKD-EPI)NonAf   


 


BUN/Creatinine Ratio   


 


Glucose   


 


POC Glucose (mg/dL)  257 H  217 H  154 H


 


Hemoglobin A1c   


 


Calcium   


 


Magnesium   


 


Ferritin   


 


AST   


 


ALT   


 


Lactate Dehydrogenase   


 


Creatine Kinase   


 


Troponin I   


 


C-Reactive Protein   


 


Total Protein   


 


Albumin   


 


Procalcitonin   


 


TSH   


 


Ur Specific Gravity   


 


Urine Protein   


 


Urine Glucose (UA)   


 


Urine Ketones   


 


Urine Blood   


 


Urine Mucus   


 


Coronavirus (PCR)   














  12/12/20 12/12/20 12/12/20





  09:11 09:11 09:11


 


WBC  14.4 H  


 


RBC   


 


Hgb   


 


Hct   


 


Plt Count   


 


Neutrophils #   


 


Lymphocytes #   


 


Fibrinogen   


 


D-Dimer   


 


ABG pH   


 


ABG pCO2   


 


ABG pO2   


 


ABG HCO3   


 


ABG Total CO2   


 


ABG O2 Saturation   


 


Sodium   136 L 


 


Chloride   109 H 


 


Carbon Dioxide   16 L 


 


Anion Gap   


 


BUN   21 H 


 


Est GFR (CKD-EPI)NonAf   


 


BUN/Creatinine Ratio   


 


Glucose   268 H 


 


POC Glucose (mg/dL)   


 


Hemoglobin A1c   


 


Calcium   


 


Magnesium   


 


Ferritin   


 


AST   


 


ALT   


 


Lactate Dehydrogenase   


 


Creatine Kinase   


 


Troponin I   


 


C-Reactive Protein   


 


Total Protein   


 


Albumin   


 


Procalcitonin   


 


TSH    0.403 L


 


Ur Specific Gravity   


 


Urine Protein   


 


Urine Glucose (UA)   


 


Urine Ketones   


 


Urine Blood   


 


Urine Mucus   


 


Coronavirus (PCR)   














  12/12/20 12/12/20 12/12/20





  12:00 14:32 14:32


 


WBC   14.9 H 


 


RBC   


 


Hgb   


 


Hct   


 


Plt Count   502 H 


 


Neutrophils #   13.4 H 


 


Lymphocytes #   0.8 L 


 


Fibrinogen   


 


D-Dimer    3.15 H


 


ABG pH   


 


ABG pCO2   


 


ABG pO2   


 


ABG HCO3   


 


ABG Total CO2   


 


ABG O2 Saturation   


 


Sodium   


 


Chloride   


 


Carbon Dioxide   


 


Anion Gap   


 


BUN   


 


Est GFR (CKD-EPI)NonAf   


 


BUN/Creatinine Ratio   


 


Glucose   


 


POC Glucose (mg/dL)  271 H  


 


Hemoglobin A1c   


 


Calcium   


 


Magnesium   


 


Ferritin   


 


AST   


 


ALT   


 


Lactate Dehydrogenase   


 


Creatine Kinase   


 


Troponin I   


 


C-Reactive Protein   


 


Total Protein   


 


Albumin   


 


Procalcitonin   


 


TSH   


 


Ur Specific Gravity   


 


Urine Protein   


 


Urine Glucose (UA)   


 


Urine Ketones   


 


Urine Blood   


 


Urine Mucus   


 


Coronavirus (PCR)   














  12/12/20 12/12/20 12/12/20





  14:32 14:32 14:42


 


WBC   


 


RBC   


 


Hgb   


 


Hct   


 


Plt Count   


 


Neutrophils #   


 


Lymphocytes #   


 


Fibrinogen   


 


D-Dimer   


 


ABG pH    7.48 H


 


ABG pCO2    22 L


 


ABG pO2    58 L*


 


ABG HCO3    16 L


 


ABG Total CO2    17 L


 


ABG O2 Saturation    89.4 L


 


Sodium  135 L  


 


Chloride   


 


Carbon Dioxide  17 L  


 


Anion Gap   


 


BUN  22 H  


 


Est GFR (CKD-EPI)NonAf   


 


BUN/Creatinine Ratio   


 


Glucose  274 H  


 


POC Glucose (mg/dL)   


 


Hemoglobin A1c   


 


Calcium   


 


Magnesium   


 


Ferritin   


 


AST   


 


ALT   


 


Lactate Dehydrogenase  858 H  


 


Creatine Kinase   


 


Troponin I   0.142 H* 


 


C-Reactive Protein  24.5 H  


 


Total Protein   


 


Albumin   


 


Procalcitonin   


 


TSH   


 


Ur Specific Gravity   


 


Urine Protein   


 


Urine Glucose (UA)   


 


Urine Ketones   


 


Urine Blood   


 


Urine Mucus   


 


Coronavirus (PCR)   














  12/12/20 12/12/20 12/13/20





  16:34 20:00 00:00


 


WBC   


 


RBC   


 


Hgb   


 


Hct   


 


Plt Count   


 


Neutrophils #   


 


Lymphocytes #   


 


Fibrinogen   


 


D-Dimer   


 


ABG pH   


 


ABG pCO2   


 


ABG pO2   


 


ABG HCO3   


 


ABG Total CO2   


 


ABG O2 Saturation   


 


Sodium   


 


Chloride   


 


Carbon Dioxide   


 


Anion Gap   


 


BUN   


 


Est GFR (CKD-EPI)NonAf   


 


BUN/Creatinine Ratio   


 


Glucose   


 


POC Glucose (mg/dL)  228 H  227 H  155 H


 


Hemoglobin A1c   


 


Calcium   


 


Magnesium   


 


Ferritin   


 


AST   


 


ALT   


 


Lactate Dehydrogenase   


 


Creatine Kinase   


 


Troponin I   


 


C-Reactive Protein   


 


Total Protein   


 


Albumin   


 


Procalcitonin   


 


TSH   


 


Ur Specific Gravity   


 


Urine Protein   


 


Urine Glucose (UA)   


 


Urine Ketones   


 


Urine Blood   


 


Urine Mucus   


 


Coronavirus (PCR)   














  12/13/20 12/13/20 12/13/20





  04:13 04:13 04:13


 


WBC  13.5 H  


 


RBC  4.13 L  


 


Hgb  12.7 L  


 


Hct  38.8 L  


 


Plt Count   


 


Neutrophils #  12.1 H  


 


Lymphocytes #  0.6 L  


 


Fibrinogen   538 H 


 


D-Dimer   5.77 H 


 


ABG pH   


 


ABG pCO2   


 


ABG pO2   


 


ABG HCO3   


 


ABG Total CO2   


 


ABG O2 Saturation   


 


Sodium    135 L


 


Chloride    108 H


 


Carbon Dioxide    20 L


 


Anion Gap   


 


BUN    25 H


 


Est GFR (CKD-EPI)NonAf   


 


BUN/Creatinine Ratio   


 


Glucose    169 H


 


POC Glucose (mg/dL)   


 


Hemoglobin A1c   


 


Calcium    8.3 L


 


Magnesium   


 


Ferritin    639.9 H


 


AST   


 


ALT   


 


Lactate Dehydrogenase    904 H


 


Creatine Kinase   


 


Troponin I   


 


C-Reactive Protein    31.2 H


 


Total Protein    5.5 L


 


Albumin    2.8 L


 


Procalcitonin   


 


TSH   


 


Ur Specific Gravity   


 


Urine Protein   


 


Urine Glucose (UA)   


 


Urine Ketones   


 


Urine Blood   


 


Urine Mucus   


 


Coronavirus (PCR)   














  12/13/20 12/13/20 12/13/20





  07:03 08:34 08:40


 


WBC   


 


RBC   


 


Hgb   


 


Hct   


 


Plt Count   


 


Neutrophils #   


 


Lymphocytes #   


 


Fibrinogen   


 


D-Dimer   


 


ABG pH   


 


ABG pCO2   27 L 


 


ABG pO2   77 L 


 


ABG HCO3   18 L 


 


ABG Total CO2   


 


ABG O2 Saturation   93.8 L 


 


Sodium   


 


Chloride   


 


Carbon Dioxide   


 


Anion Gap   


 


BUN   


 


Est GFR (CKD-EPI)NonAf   


 


BUN/Creatinine Ratio   


 


Glucose   


 


POC Glucose (mg/dL)  184 H  


 


Hemoglobin A1c   


 


Calcium   


 


Magnesium   


 


Ferritin   


 


AST   


 


ALT   


 


Lactate Dehydrogenase   


 


Creatine Kinase   


 


Troponin I    0.066 H*


 


C-Reactive Protein   


 


Total Protein   


 


Albumin   


 


Procalcitonin   


 


TSH   


 


Ur Specific Gravity   


 


Urine Protein   


 


Urine Glucose (UA)   


 


Urine Ketones   


 


Urine Blood   


 


Urine Mucus   


 


Coronavirus (PCR)   














  12/13/20 12/13/20 12/13/20





  11:38 17:11 21:56


 


WBC   


 


RBC   


 


Hgb   


 


Hct   


 


Plt Count   


 


Neutrophils #   


 


Lymphocytes #   


 


Fibrinogen   


 


D-Dimer   


 


ABG pH   


 


ABG pCO2   


 


ABG pO2   


 


ABG HCO3   


 


ABG Total CO2   


 


ABG O2 Saturation   


 


Sodium   


 


Chloride   


 


Carbon Dioxide   


 


Anion Gap   


 


BUN   


 


Est GFR (CKD-EPI)NonAf   


 


BUN/Creatinine Ratio   


 


Glucose   


 


POC Glucose (mg/dL)  171 H  205 H  220 H


 


Hemoglobin A1c   


 


Calcium   


 


Magnesium   


 


Ferritin   


 


AST   


 


ALT   


 


Lactate Dehydrogenase   


 


Creatine Kinase   


 


Troponin I   


 


C-Reactive Protein   


 


Total Protein   


 


Albumin   


 


Procalcitonin   


 


TSH   


 


Ur Specific Gravity   


 


Urine Protein   


 


Urine Glucose (UA)   


 


Urine Ketones   


 


Urine Blood   


 


Urine Mucus   


 


Coronavirus (PCR)   














  12/14/20 12/14/20 12/14/20





  03:54 03:54 03:54


 


WBC  13.3 H  


 


RBC  4.09 L  


 


Hgb  12.7 L  


 


Hct  38.5 L  


 


Plt Count   


 


Neutrophils #  12.2 H  


 


Lymphocytes #  0.5 L  


 


Fibrinogen   


 


D-Dimer   11.67 H 


 


ABG pH   


 


ABG pCO2   


 


ABG pO2   


 


ABG HCO3   


 


ABG Total CO2   


 


ABG O2 Saturation   


 


Sodium    134 L


 


Chloride   


 


Carbon Dioxide   


 


Anion Gap   


 


BUN    29 H


 


Est GFR (CKD-EPI)NonAf   


 


BUN/Creatinine Ratio   


 


Glucose    219 H


 


POC Glucose (mg/dL)   


 


Hemoglobin A1c   


 


Calcium    7.8 L


 


Magnesium   


 


Ferritin    672.1 H


 


AST   


 


ALT   


 


Lactate Dehydrogenase    995 H


 


Creatine Kinase   


 


Troponin I   


 


C-Reactive Protein    24.2 H


 


Total Protein    5.2 L


 


Albumin    2.6 L


 


Procalcitonin   


 


TSH   


 


Ur Specific Gravity   


 


Urine Protein   


 


Urine Glucose (UA)   


 


Urine Ketones   


 


Urine Blood   


 


Urine Mucus   


 


Coronavirus (PCR)   














  12/14/20 12/14/20 12/14/20





  07:00 11:41 16:49


 


WBC   


 


RBC   


 


Hgb   


 


Hct   


 


Plt Count   


 


Neutrophils #   


 


Lymphocytes #   


 


Fibrinogen   


 


D-Dimer   


 


ABG pH   


 


ABG pCO2   


 


ABG pO2   


 


ABG HCO3   


 


ABG Total CO2   


 


ABG O2 Saturation   


 


Sodium   


 


Chloride   


 


Carbon Dioxide   


 


Anion Gap   


 


BUN   


 


Est GFR (CKD-EPI)NonAf   


 


BUN/Creatinine Ratio   


 


Glucose   


 


POC Glucose (mg/dL)  214 H  258 H  338 H


 


Hemoglobin A1c   


 


Calcium   


 


Magnesium   


 


Ferritin   


 


AST   


 


ALT   


 


Lactate Dehydrogenase   


 


Creatine Kinase   


 


Troponin I   


 


C-Reactive Protein   


 


Total Protein   


 


Albumin   


 


Procalcitonin   


 


TSH   


 


Ur Specific Gravity   


 


Urine Protein   


 


Urine Glucose (UA)   


 


Urine Ketones   


 


Urine Blood   


 


Urine Mucus   


 


Coronavirus (PCR)   














  12/14/20 12/14/20 12/14/20





  17:51 18:53 19:52


 


WBC   


 


RBC   


 


Hgb   


 


Hct   


 


Plt Count   


 


Neutrophils #   


 


Lymphocytes #   


 


Fibrinogen   


 


D-Dimer   


 


ABG pH   


 


ABG pCO2   


 


ABG pO2   


 


ABG HCO3   


 


ABG Total CO2   


 


ABG O2 Saturation   


 


Sodium   


 


Chloride   


 


Carbon Dioxide   


 


Anion Gap   


 


BUN   


 


Est GFR (CKD-EPI)NonAf   


 


BUN/Creatinine Ratio   


 


Glucose   


 


POC Glucose (mg/dL)  313 H  274 H  163 H


 


Hemoglobin A1c   


 


Calcium   


 


Magnesium   


 


Ferritin   


 


AST   


 


ALT   


 


Lactate Dehydrogenase   


 


Creatine Kinase   


 


Troponin I   


 


C-Reactive Protein   


 


Total Protein   


 


Albumin   


 


Procalcitonin   


 


TSH   


 


Ur Specific Gravity   


 


Urine Protein   


 


Urine Glucose (UA)   


 


Urine Ketones   


 


Urine Blood   


 


Urine Mucus   


 


Coronavirus (PCR)   














  12/14/20 12/14/20 12/14/20





  20:52 22:07 23:05


 


WBC   


 


RBC   


 


Hgb   


 


Hct   


 


Plt Count   


 


Neutrophils #   


 


Lymphocytes #   


 


Fibrinogen   


 


D-Dimer   


 


ABG pH   


 


ABG pCO2   


 


ABG pO2   


 


ABG HCO3   


 


ABG Total CO2   


 


ABG O2 Saturation   


 


Sodium   


 


Chloride   


 


Carbon Dioxide   


 


Anion Gap   


 


BUN   


 


Est GFR (CKD-EPI)NonAf   


 


BUN/Creatinine Ratio   


 


Glucose   


 


POC Glucose (mg/dL)  129 H  115 H  141 H


 


Hemoglobin A1c   


 


Calcium   


 


Magnesium   


 


Ferritin   


 


AST   


 


ALT   


 


Lactate Dehydrogenase   


 


Creatine Kinase   


 


Troponin I   


 


C-Reactive Protein   


 


Total Protein   


 


Albumin   


 


Procalcitonin   


 


TSH   


 


Ur Specific Gravity   


 


Urine Protein   


 


Urine Glucose (UA)   


 


Urine Ketones   


 


Urine Blood   


 


Urine Mucus   


 


Coronavirus (PCR)   














  12/15/20 12/15/20 12/15/20





  01:10 02:17 04:40


 


WBC   


 


RBC   


 


Hgb   


 


Hct   


 


Plt Count   


 


Neutrophils #   


 


Lymphocytes #   


 


Fibrinogen   


 


D-Dimer   


 


ABG pH   


 


ABG pCO2   


 


ABG pO2   


 


ABG HCO3   


 


ABG Total CO2   


 


ABG O2 Saturation   


 


Sodium   


 


Chloride   


 


Carbon Dioxide   


 


Anion Gap   


 


BUN   


 


Est GFR (CKD-EPI)NonAf   


 


BUN/Creatinine Ratio   


 


Glucose   


 


POC Glucose (mg/dL)  141 H  137 H  148 H


 


Hemoglobin A1c   


 


Calcium   


 


Magnesium   


 


Ferritin   


 


AST   


 


ALT   


 


Lactate Dehydrogenase   


 


Creatine Kinase   


 


Troponin I   


 


C-Reactive Protein   


 


Total Protein   


 


Albumin   


 


Procalcitonin   


 


TSH   


 


Ur Specific Gravity   


 


Urine Protein   


 


Urine Glucose (UA)   


 


Urine Ketones   


 


Urine Blood   


 


Urine Mucus   


 


Coronavirus (PCR)   














  12/15/20 12/15/20 12/15/20





  04:42 04:42 06:15


 


WBC  15.9 H  


 


RBC   


 


Hgb   


 


Hct   


 


Plt Count   


 


Neutrophils #  14.8 H  


 


Lymphocytes #  0.4 L  


 


Fibrinogen   


 


D-Dimer   


 


ABG pH   


 


ABG pCO2   


 


ABG pO2   


 


ABG HCO3   


 


ABG Total CO2   


 


ABG O2 Saturation   


 


Sodium   135 L 


 


Chloride   108 H 


 


Carbon Dioxide   


 


Anion Gap   


 


BUN   27 H 


 


Est GFR (CKD-EPI)NonAf   


 


BUN/Creatinine Ratio   


 


Glucose   150 H 


 


POC Glucose (mg/dL)    145 H


 


Hemoglobin A1c   


 


Calcium   8.0 L 


 


Magnesium   


 


Ferritin   


 


AST   


 


ALT   71 H 


 


Lactate Dehydrogenase   


 


Creatine Kinase   


 


Troponin I   


 


C-Reactive Protein   


 


Total Protein   5.4 L 


 


Albumin   2.6 L 


 


Procalcitonin   


 


TSH   


 


Ur Specific Gravity   


 


Urine Protein   


 


Urine Glucose (UA)   


 


Urine Ketones   


 


Urine Blood   


 


Urine Mucus   


 


Coronavirus (PCR)   














  12/15/20 12/15/20 12/15/20





  07:16 08:02 09:08


 


WBC   


 


RBC   


 


Hgb   


 


Hct   


 


Plt Count   


 


Neutrophils #   


 


Lymphocytes #   


 


Fibrinogen   


 


D-Dimer   


 


ABG pH   


 


ABG pCO2   


 


ABG pO2   


 


ABG HCO3   


 


ABG Total CO2   


 


ABG O2 Saturation   


 


Sodium   


 


Chloride   


 


Carbon Dioxide   


 


Anion Gap   


 


BUN   


 


Est GFR (CKD-EPI)NonAf   


 


BUN/Creatinine Ratio   


 


Glucose   


 


POC Glucose (mg/dL)  157 H  162 H  185 H


 


Hemoglobin A1c   


 


Calcium   


 


Magnesium   


 


Ferritin   


 


AST   


 


ALT   


 


Lactate Dehydrogenase   


 


Creatine Kinase   


 


Troponin I   


 


C-Reactive Protein   


 


Total Protein   


 


Albumin   


 


Procalcitonin   


 


TSH   


 


Ur Specific Gravity   


 


Urine Protein   


 


Urine Glucose (UA)   


 


Urine Ketones   


 


Urine Blood   


 


Urine Mucus   


 


Coronavirus (PCR)   














  12/15/20 12/15/20 12/15/20





  10:55 11:58 13:52


 


WBC   


 


RBC   


 


Hgb   


 


Hct   


 


Plt Count   


 


Neutrophils #   


 


Lymphocytes #   


 


Fibrinogen   


 


D-Dimer   


 


ABG pH   


 


ABG pCO2   


 


ABG pO2   


 


ABG HCO3   


 


ABG Total CO2   


 


ABG O2 Saturation   


 


Sodium   


 


Chloride   


 


Carbon Dioxide   


 


Anion Gap   


 


BUN   


 


Est GFR (CKD-EPI)NonAf   


 


BUN/Creatinine Ratio   


 


Glucose   


 


POC Glucose (mg/dL)  196 H  185 H  211 H


 


Hemoglobin A1c   


 


Calcium   


 


Magnesium   


 


Ferritin   


 


AST   


 


ALT   


 


Lactate Dehydrogenase   


 


Creatine Kinase   


 


Troponin I   


 


C-Reactive Protein   


 


Total Protein   


 


Albumin   


 


Procalcitonin   


 


TSH   


 


Ur Specific Gravity   


 


Urine Protein   


 


Urine Glucose (UA)   


 


Urine Ketones   


 


Urine Blood   


 


Urine Mucus   


 


Coronavirus (PCR)   














  12/15/20 12/15/20 12/15/20





  15:45 21:10 23:40


 


WBC   


 


RBC   


 


Hgb   


 


Hct   


 


Plt Count   


 


Neutrophils #   


 


Lymphocytes #   


 


Fibrinogen   


 


D-Dimer   


 


ABG pH   


 


ABG pCO2   


 


ABG pO2   


 


ABG HCO3   


 


ABG Total CO2   


 


ABG O2 Saturation   


 


Sodium   


 


Chloride   


 


Carbon Dioxide   


 


Anion Gap   


 


BUN   


 


Est GFR (CKD-EPI)NonAf   


 


BUN/Creatinine Ratio   


 


Glucose   


 


POC Glucose (mg/dL)  178 H  280 H  291 H


 


Hemoglobin A1c   


 


Calcium   


 


Magnesium   


 


Ferritin   


 


AST   


 


ALT   


 


Lactate Dehydrogenase   


 


Creatine Kinase   


 


Troponin I   


 


C-Reactive Protein   


 


Total Protein   


 


Albumin   


 


Procalcitonin   


 


TSH   


 


Ur Specific Gravity   


 


Urine Protein   


 


Urine Glucose (UA)   


 


Urine Ketones   


 


Urine Blood   


 


Urine Mucus   


 


Coronavirus (PCR)   














  12/16/20 12/16/20 12/16/20





  04:05 04:05 04:05


 


WBC  13.4 H  


 


RBC  4.27 L  


 


Hgb   


 


Hct   


 


Plt Count   


 


Neutrophils #  12.5 H  


 


Lymphocytes #  0.4 L  


 


Fibrinogen   


 


D-Dimer    14.01 H


 


ABG pH   


 


ABG pCO2   


 


ABG pO2   


 


ABG HCO3   


 


ABG Total CO2   


 


ABG O2 Saturation   


 


Sodium   135 L 


 


Chloride   


 


Carbon Dioxide   


 


Anion Gap   


 


BUN   24 H 


 


Est GFR (CKD-EPI)NonAf   


 


BUN/Creatinine Ratio   


 


Glucose   163 H 


 


POC Glucose (mg/dL)   


 


Hemoglobin A1c   


 


Calcium   8.1 L 


 


Magnesium   


 


Ferritin   1019.2 H 


 


AST   


 


ALT   55 H 


 


Lactate Dehydrogenase   1456 H 


 


Creatine Kinase   


 


Troponin I   


 


C-Reactive Protein   48.0 H 


 


Total Protein   5.3 L 


 


Albumin   2.5 L 


 


Procalcitonin   


 


TSH   


 


Ur Specific Gravity   


 


Urine Protein   


 


Urine Glucose (UA)   


 


Urine Ketones   


 


Urine Blood   


 


Urine Mucus   


 


Coronavirus (PCR)   














  12/16/20 12/16/20 12/16/20





  04:48 09:39 11:27


 


WBC   


 


RBC   


 


Hgb   


 


Hct   


 


Plt Count   


 


Neutrophils #   


 


Lymphocytes #   


 


Fibrinogen   


 


D-Dimer   


 


ABG pH   


 


ABG pCO2   


 


ABG pO2   


 


ABG HCO3   


 


ABG Total CO2   


 


ABG O2 Saturation   


 


Sodium   


 


Chloride   


 


Carbon Dioxide   


 


Anion Gap   


 


BUN   


 


Est GFR (CKD-EPI)NonAf   


 


BUN/Creatinine Ratio   


 


Glucose   


 


POC Glucose (mg/dL)  147 H  193 H  321 H


 


Hemoglobin A1c   


 


Calcium   


 


Magnesium   


 


Ferritin   


 


AST   


 


ALT   


 


Lactate Dehydrogenase   


 


Creatine Kinase   


 


Troponin I   


 


C-Reactive Protein   


 


Total Protein   


 


Albumin   


 


Procalcitonin   


 


TSH   


 


Ur Specific Gravity   


 


Urine Protein   


 


Urine Glucose (UA)   


 


Urine Ketones   


 


Urine Blood   


 


Urine Mucus   


 


Coronavirus (PCR)   














  12/16/20 12/16/20 12/16/20





  17:04 20:01 22:04


 


WBC   


 


RBC   


 


Hgb   


 


Hct   


 


Plt Count   


 


Neutrophils #   


 


Lymphocytes #   


 


Fibrinogen   


 


D-Dimer   


 


ABG pH   


 


ABG pCO2   


 


ABG pO2   


 


ABG HCO3   


 


ABG Total CO2   


 


ABG O2 Saturation   


 


Sodium   


 


Chloride   


 


Carbon Dioxide   


 


Anion Gap   


 


BUN   


 


Est GFR (CKD-EPI)NonAf   


 


BUN/Creatinine Ratio   


 


Glucose   


 


POC Glucose (mg/dL)  236 H  250 H  238 H


 


Hemoglobin A1c   


 


Calcium   


 


Magnesium   


 


Ferritin   


 


AST   


 


ALT   


 


Lactate Dehydrogenase   


 


Creatine Kinase   


 


Troponin I   


 


C-Reactive Protein   


 


Total Protein   


 


Albumin   


 


Procalcitonin   


 


TSH   


 


Ur Specific Gravity   


 


Urine Protein   


 


Urine Glucose (UA)   


 


Urine Ketones   


 


Urine Blood   


 


Urine Mucus   


 


Coronavirus (PCR)   














  12/16/20 12/17/20 12/17/20





  23:45 03:55 03:55


 


WBC    14.2 H


 


RBC    4.28 L


 


Hgb   


 


Hct   


 


Plt Count   


 


Neutrophils #    13.5 H


 


Lymphocytes #    0.4 L


 


Fibrinogen   


 


D-Dimer   


 


ABG pH   


 


ABG pCO2   


 


ABG pO2   


 


ABG HCO3   


 


ABG Total CO2   


 


ABG O2 Saturation   


 


Sodium   135 L 


 


Chloride   108 H 


 


Carbon Dioxide   


 


Anion Gap   


 


BUN   23 H 


 


Est GFR (CKD-EPI)NonAf   


 


BUN/Creatinine Ratio   


 


Glucose   


 


POC Glucose (mg/dL)  113 H  


 


Hemoglobin A1c   


 


Calcium   8.0 L 


 


Magnesium   


 


Ferritin   1054.3 H 


 


AST   


 


ALT   53 H 


 


Lactate Dehydrogenase   1475 H 


 


Creatine Kinase   33 L 


 


Troponin I   


 


C-Reactive Protein   35.0 H 


 


Total Protein   5.1 L 


 


Albumin   2.4 L 


 


Procalcitonin   


 


TSH   


 


Ur Specific Gravity   


 


Urine Protein   


 


Urine Glucose (UA)   


 


Urine Ketones   


 


Urine Blood   


 


Urine Mucus   


 


Coronavirus (PCR)   














  12/17/20 12/17/20 12/17/20





  12:03 16:04 16:15


 


WBC   


 


RBC   


 


Hgb   


 


Hct   


 


Plt Count   


 


Neutrophils #   


 


Lymphocytes #   


 


Fibrinogen   


 


D-Dimer   


 


ABG pH   


 


ABG pCO2   


 


ABG pO2   


 


ABG HCO3   


 


ABG Total CO2   


 


ABG O2 Saturation   


 


Sodium   


 


Chloride   


 


Carbon Dioxide   


 


Anion Gap   


 


BUN   


 


Est GFR (CKD-EPI)NonAf   


 


BUN/Creatinine Ratio   


 


Glucose   


 


POC Glucose (mg/dL)  223 H  335 H  278 H


 


Hemoglobin A1c   


 


Calcium   


 


Magnesium   


 


Ferritin   


 


AST   


 


ALT   


 


Lactate Dehydrogenase   


 


Creatine Kinase   


 


Troponin I   


 


C-Reactive Protein   


 


Total Protein   


 


Albumin   


 


Procalcitonin   


 


TSH   


 


Ur Specific Gravity   


 


Urine Protein   


 


Urine Glucose (UA)   


 


Urine Ketones   


 


Urine Blood   


 


Urine Mucus   


 


Coronavirus (PCR)   














Assessment and Plan


Assessment: 





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 

  stroke from small vessel disease. 


* New onset Atrial fibrillation, on anticoagulation with Xarelto.


* Acute COVID-19 pneumonitis


* New onset diabetes, poorly controlled


* Hypertension


* Hyperlipidemia


* History of CAD.





Plan: 





* Patient has acute right hemiparesis, possible lacunar stroke from small vessel

   disease from uncontrolled diabetes.  Need to rule out cardio-embolic event 

  from atrial fibrillation, although patient has been on anticoagulation with 

  Xarelto for atrial fibrillation since 12/06/2020.  


* MRI of brain to localize CVA.


* No large vessel occlusion noted on CTA of head and neck.  


* Patient's diabetes is poorly controlled, need to optimize diabetes to target 

  A1c <7.0


* Fasting a.m. lipid panel.  Lipitor 40 mg daily.


* Permissible hypertension for 24-48 hours.


* 2-D echo rule out left ventricular thrombus/PFO.


* PT and OT.


* We will follow.

## 2020-12-18 LAB
ALBUMIN SERPL-MCNC: 2.7 G/DL (ref 3.5–5)
ALP SERPL-CCNC: 98 U/L (ref 38–126)
ALT SERPL-CCNC: 61 U/L (ref 4–49)
ANION GAP SERPL CALC-SCNC: 4 MMOL/L
AST SERPL-CCNC: 33 U/L (ref 17–59)
BASOPHILS # BLD AUTO: 0 K/UL (ref 0–0.2)
BASOPHILS NFR BLD AUTO: 0 %
BUN SERPL-SCNC: 26 MG/DL (ref 9–20)
CALCIUM SPEC-MCNC: 8.6 MG/DL (ref 8.4–10.2)
CHLORIDE SERPL-SCNC: 105 MMOL/L (ref 98–107)
CHOLEST SERPL-MCNC: 159 MG/DL (ref ?–200)
CK SERPL-CCNC: 34 U/L (ref 55–170)
CO2 SERPL-SCNC: 27 MMOL/L (ref 22–30)
EOSINOPHIL # BLD AUTO: 0 K/UL (ref 0–0.7)
EOSINOPHIL NFR BLD AUTO: 0 %
ERYTHROCYTE [DISTWIDTH] IN BLOOD BY AUTOMATED COUNT: 4.51 M/UL (ref 4.3–5.9)
ERYTHROCYTE [DISTWIDTH] IN BLOOD: 13.7 % (ref 11.5–15.5)
FERRITIN SERPL-MCNC: 886.1 NG/ML (ref 22–322)
GLUCOSE BLD-MCNC: 163 MG/DL (ref 75–99)
GLUCOSE BLD-MCNC: 209 MG/DL (ref 75–99)
GLUCOSE BLD-MCNC: 227 MG/DL (ref 75–99)
GLUCOSE BLD-MCNC: 233 MG/DL (ref 75–99)
GLUCOSE BLD-MCNC: 263 MG/DL (ref 75–99)
GLUCOSE BLD-MCNC: 282 MG/DL (ref 75–99)
GLUCOSE BLD-MCNC: 307 MG/DL (ref 75–99)
GLUCOSE SERPL-MCNC: 166 MG/DL (ref 74–99)
HCT VFR BLD AUTO: 42 % (ref 39–53)
HDLC SERPL-MCNC: 38 MG/DL (ref 40–60)
HGB BLD-MCNC: 13.7 GM/DL (ref 13–17.5)
LDH SPEC-CCNC: 1485 U/L (ref 313–618)
LDLC SERPL CALC-MCNC: 105 MG/DL (ref 0–99)
LYMPHOCYTES # SPEC AUTO: 0.5 K/UL (ref 1–4.8)
LYMPHOCYTES NFR SPEC AUTO: 4 %
MCH RBC QN AUTO: 30.4 PG (ref 25–35)
MCHC RBC AUTO-ENTMCNC: 32.6 G/DL (ref 31–37)
MCV RBC AUTO: 93.2 FL (ref 80–100)
MONOCYTES # BLD AUTO: 0.3 K/UL (ref 0–1)
MONOCYTES NFR BLD AUTO: 2 %
NEUTROPHILS # BLD AUTO: 12.8 K/UL (ref 1.3–7.7)
NEUTROPHILS NFR BLD AUTO: 94 %
PLATELET # BLD AUTO: 161 K/UL (ref 150–450)
POTASSIUM SERPL-SCNC: 4.7 MMOL/L (ref 3.5–5.1)
PROT SERPL-MCNC: 5.6 G/DL (ref 6.3–8.2)
SODIUM SERPL-SCNC: 136 MMOL/L (ref 137–145)
TRIGL SERPL-MCNC: 81 MG/DL (ref ?–150)
WBC # BLD AUTO: 13.7 K/UL (ref 3.8–10.6)

## 2020-12-18 RX ADMIN — PANTOPRAZOLE SODIUM SCH MG: 40 TABLET, DELAYED RELEASE ORAL at 08:46

## 2020-12-18 RX ADMIN — METOPROLOL TARTRATE SCH MG: 25 TABLET, FILM COATED ORAL at 16:12

## 2020-12-18 RX ADMIN — OXYCODONE HYDROCHLORIDE AND ACETAMINOPHEN SCH MG: 500 TABLET ORAL at 08:46

## 2020-12-18 RX ADMIN — CEFAZOLIN SCH: 330 INJECTION, POWDER, FOR SOLUTION INTRAMUSCULAR; INTRAVENOUS at 04:35

## 2020-12-18 RX ADMIN — INSULIN DETEMIR SCH UNIT: 100 INJECTION, SOLUTION SUBCUTANEOUS at 06:34

## 2020-12-18 RX ADMIN — AMIODARONE HYDROCHLORIDE SCH MG: 200 TABLET ORAL at 08:46

## 2020-12-18 RX ADMIN — DEXTROSE SCH MG: 50 INJECTION, SOLUTION INTRAVENOUS at 08:47

## 2020-12-18 RX ADMIN — Medication SCH MG: at 08:46

## 2020-12-18 RX ADMIN — INSULIN ASPART SCH UNIT: 100 INJECTION, SOLUTION INTRAVENOUS; SUBCUTANEOUS at 00:25

## 2020-12-18 RX ADMIN — RIVAROXABAN SCH MG: 20 TABLET, FILM COATED ORAL at 16:13

## 2020-12-18 RX ADMIN — DEXTROSE SCH MG: 50 INJECTION, SOLUTION INTRAVENOUS at 21:03

## 2020-12-18 RX ADMIN — INSULIN ASPART SCH UNIT: 100 INJECTION, SOLUTION INTRAVENOUS; SUBCUTANEOUS at 04:30

## 2020-12-18 RX ADMIN — INSULIN ASPART SCH UNIT: 100 INJECTION, SOLUTION INTRAVENOUS; SUBCUTANEOUS at 16:13

## 2020-12-18 RX ADMIN — INSULIN ASPART SCH UNIT: 100 INJECTION, SOLUTION INTRAVENOUS; SUBCUTANEOUS at 08:46

## 2020-12-18 RX ADMIN — INSULIN ASPART SCH UNIT: 100 INJECTION, SOLUTION INTRAVENOUS; SUBCUTANEOUS at 12:28

## 2020-12-18 RX ADMIN — INSULIN ASPART SCH UNIT: 100 INJECTION, SOLUTION INTRAVENOUS; SUBCUTANEOUS at 21:03

## 2020-12-18 RX ADMIN — AMIODARONE HYDROCHLORIDE SCH MG: 200 TABLET ORAL at 21:02

## 2020-12-18 RX ADMIN — METOPROLOL TARTRATE SCH MG: 25 TABLET, FILM COATED ORAL at 21:03

## 2020-12-18 RX ADMIN — METOPROLOL TARTRATE SCH MG: 25 TABLET, FILM COATED ORAL at 08:46

## 2020-12-18 RX ADMIN — INSULIN ASPART SCH UNIT: 100 INJECTION, SOLUTION INTRAVENOUS; SUBCUTANEOUS at 23:51

## 2020-12-18 NOTE — PN
PROGRESS NOTE



DATE OF SERVICE:

12/18/2020



REASON FOR FOLLOWUP:

COVID-19 pneumonia.



INTERVAL HISTORY:

The patient is currently afebrile, has been breathing comfortably.  He remains to be on

high-flow oxygen, but denies having any chest pain.  Minimal cough.  No abdominal pain

or diarrhea.



PHYSICAL EXAMINATION:

Blood pressure 151/89, pulse of 83, temperature 98.3.  He is 96% on 50 FIO2. General

description is an elderly male up in the bed in no distress.  Respiratory system:

Unlabored breathing.  Some coarse breath sounds in the bases.  No wheeze.

HEART: S1, S2.  Regular rate and rhythm.  Abdomen soft, no tenderness.



LAB:

Hemoglobin 13.1, white count 13.7. BUN of 26, creatinine 0.85.



DIAGNOSTIC IMPRESSION AND PLAN:

Patient with acute COVID-19 pneumonia.  The patient completed his Remdesivir therapy,

currently on dexamethasone and Xarelto, and zinc to continue and monitor clinical

course closely.





MMODL / IJN: [id number] / Job#: [id number]

## 2020-12-18 NOTE — XR
EXAMINATION TYPE: XR chest 1V portable

 

DATE OF EXAM: 12/18/2020

 

HISTORY: Shortness of breath.

 

COMPARISON: 12/17/2020

 

TECHNIQUE: Single view of the chest is submitted.

 

FINDINGS:

Demonstrated are scattered senescent parenchymal change.  

 

Coarse bilateral infiltrates persist throughout both lung fields.

 

The heart is stable.

 

Hilar and mediastinal structures are within normal limits.  

 

Degenerative changes are seen of the dorsal spine. 

 

 IMPRESSION: 

 

1.  Coarse bilateral infiltrates persist throughout both lung fields.

## 2020-12-18 NOTE — ECHOF
Referral Reason:Acute CVA, atrial fibrillation



MEASUREMENTS

--------

HEIGHT: 180.3 cm

WEIGHT: 94.3 kg

BP: 148/79

RVIDd:   2.2 cm     (< 3.3)

IVSd:   1.3 cm     (0.6 - 1.1)

LVIDd:   5.3 cm     (3.9 - 5.3)

LVPWd:   1.2 cm     (0.6 - 1.1)

IVSs:   1.5 cm

LVIDs:   3.4 cm

LVPWs:   2.0 cm

Ao Diam:   2.9 cm     (2.0 - 3.7)

AV Cusp:   1.8 cm     (1.5 - 2.6)

LA Diam:   2.1 cm     (2.7 - 3.8)

MV EXCURSION:   13.536 mm     (> 18.000)

MV EF SLOPE:   54 mm/s     (70 - 150)

EPSS:   1.7 cm

MV E Angel:   0.74 m/s

MV DecT:   214 ms

MV A Angel:   0.53 m/s

MV E/A Ratio:   1.40 

AR PHT:   660 ms

RAP:   5.00 mmHg

RVSP:   10.02 mmHg







FINDINGS

--------

This was a technically difficult study with suboptimal views.

The left ventricular size is normal.   There is mild concentric left ventricular hypertrophy.   There
 is mild global hypokinesis of LV .   Overall left ventricular systolic function is mildly impaired w
ith, an EF between 45 - 50 %.   There is paradoxical/dysynergic septal motion consistent with post-op
erative status.

The right ventricle is normal in size.

The left atrial size is normal.

The right atrial size is normal.

Lumason used

Aortic valve is trileaflet and is mildly thickened.   Trace amount of aortic regurgitation.

The mitral valve is normal.   Mild mitral regurgitation is present.

The tricuspid valve appears structurally normal.   Mild tricuspid regurgitation present.   Right vent
ricular systolic pressure is normal at < 35 mmHg.

There is no pulmonic regurgitation present.

The aortic root size is normal.

IVC Not well visulized.

There is no pericardial effusion.



CONCLUSIONS

--------

1. The left ventricular size is normal.

2. There is mild concentric left ventricular hypertrophy.

3. There is mild global hypokinesis of LV .

4. Overall left ventricular systolic function is mildly impaired with, an EF between 45 - 50 %.

5. There is paradoxical/dysynergic septal motion consistent with post-operative status.

6. Aortic valve is trileaflet and is mildly thickened.

7. Trace amount of aortic regurgitation.

8. Mild mitral regurgitation is present.

9. Mild tricuspid regurgitation present.

10. There is no pericardial effusion.





SONOGRAPHER: Lisa Shepherd RDCS

## 2020-12-18 NOTE — P.PN
Subjective


Progress Note Date: 12/18/20





From Records


Mr. Chavez is a 70-year-old male,  who is a patient of Dr. Stovall with a past 

medical history of hypertension,MI coming in with a chief complaint of fatigue 

and generalized weakness that have been ongoing for past 1 week.  Patient states

that he has been having poor appetite loss of smell and loss of taste for the 

past 1 week.  He states that he has been sleeping for more than 20 hours a day. 

Patient denies having any cough or difficulty in breathing.  He denies having 

any chest pain or palpitations.  He denies having any dysuria or hematuria.  

Patient denies having any history of diabetes but his blood sugars are in 300s 

to 400s.  In the emergency room patient had a chest x-ray showing no acute 

cardiopulmonary process and an EKG showing normal sinus rhythm.  He had labs 

done showing white count of 3.9, hemoglobin 14.4, platelets 166.  Sodium 131, 

potassium 3.9, chloride.  BUN 15, creatinine 0.93 C-reactive protein 59, albumin

3.3 coronavirus PCR positive. 





On 12/6/2020 patient was seen and examined.  He is currently resting comfortably

in bed.  Appears to be no acute distress.  On reviewing the vitals patient 

continues to fever as high as 102.3.  His blood pressure has been stable around 

130s 80s and he saturating at 95% on room air.  Patient's blood sugars have been

running on the higher side.





On 12/07/2020- patient was seen and examined and the general medical floors.  He

is resting comfortably in bed.  Overnight active issues reported by nursing 

staff. Patient states that his difficulty in breathing is improving.  On 

reviewing the vitals patient's T-max is 98.4, heart rate 72, respiratory rate 

127-79 and saturating at 93% on room air.  On reviewing vitals patient's white 

count of 2.8, hemoglobin 14.4, platelets 189.  Sodium 136, potassium 4.6, 

chloride 102.  BUN 23 and creatinine of 1.0.  Blood sugars have been running 

high in 200s to 300s.  C-reactive protein 7.5.








12/08/2020


This is a pleasant 70 years old male who presents with cough with infection and 

found to have new onset diabetes with elevated hemoglobin A1c at 14%.


Patient currently with no respiratory symptoms, no chest pain or dyspnea 

coughing.  Yesterday he has a regular bowel movement but today he had 1 loose 

bowel movement but no significant abdominal pain or nausea vomiting.


Also he is running a fever of 101.  He saturating 90% at room air.


BMP and CBC were reviewed and they were unremarkable except for mild leukopenia 

2.8K.  D-dimer is negative at 0.34.  Coronavirus is detected.  Chest x-ray is 

normal


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  Normal saline at 75 mL/h was 

added.  Also Levemir 10 units daily and metformin 1000 mg twice daily has been 

added because his sugar still not controlled


Patient is able to eat % of his meal





12/9/20


Patient had a rough night because of fever, his breathing is quiet and stable 

and he's only on 2 L oxygen.  Is still have diarrhea about twice per day which 

is similar to the day before.  No abdominal pain or vomiting.  No significant 

coughing.  unstable


Chest x-ray: Worsening infiltrates, Pronecalcitonin is elevated at 0.27, 

patient is started on Rocephin and Zithromax.


He is new onset diabetes and also is on dexamethasone and his sugar more than 

300, so Levemir was decreased from 10-30 units daily.  Also he is on metformin 

1000 and Amaryl 1 mg.


Continue on Xarelto, normocephalic 75, remedisivr, dexamethasone 6 mg daily.





12/10/2020


Patient hadn't good night  because he could not sleep.  No respiratory symptoms,

ongoing generalized weakness.


Yesterday he has watery diarrhea but this morning he had small more formed bowel

movement.  No abdominal pain.


Persistent fever have subsided and his been afebrile for more than 24 hours.  We

will check Tylenol as needed


His sugar is better controlled after increasing his insulin to 30 units.


Antibiotics with ceftriaxone and Zithromax were admitted with high protei

ncalcitonin.  We will try to check sputum culture.


Check labs in the morning





12/11/2020


Patient feels much better today, his fever subsided and his on Tylenol as needed

currently.  No respiratory issue or symptom.  He had a little formed bowel 

movement today.  Currently patient remains on the same cocktail for comfort 

infection besides remedisivr


Hemodynamically stable.  Vitals are stable showing mild leukocytosis of 11.6 K





12/12/2020


pt is moved to select unit , pt developed a fib and RVR and he was started on 

Cardizem and amiodarone drip and is already on Xarelto. 


Also patient on Lopressor 75 mg 3 times a day Also he developed more hypoxic and

is currently on 50 L oxygen via nasal cannula saturating 90%.  


Repeat chest x-ray Moderate peripheral-based opacities throughout both lungs w

ith no pleural effusion.  Blood gas on BOTH HIS WITH PH 7.48, LOW PCO2 OF 22 AND

LOW PO2 AT 58 PATIENT HAS BEEN EVALUATED BY PULMONARY SERVICE AND PATIENT MAY 

NEED TO GO TO THE ICU AS WELL.


LEFT SHOWING STABLE LEUKOCYTOSIS AT 14.9 K, ELEVATED D-DIMER 3.1, BMP IS 

UNREMARKABLE AND WORSENING OF THE EDGE AND C-REACTIVE PROTEIN


Patient is kept on remedisivr, dexamethasone 6 mg daily.


However patient diarrhea has improved 








12/13/2020


Patient today kept in the ICU on BiPAP saturating 100% however he looks 

comfortable.  Distal short of breath and dyspneic


Rest of vitals are stable


Laps looks stable, inflammatory markers LDH and C-reactive protein still 

trending up slowly compared to yesterday.proCalcitonin is negative.  D-dimer is

trending up at 5.7


Chest x-ray showing stable bilateral lung infiltrates


He remains on Decadron and remdesivir


Also I kept on Cardizem drip at 20 mg an hour and amiodarone 400 mg twice daily 

and metoprolol 75 mg 3 times a day, vitamin C and A. fib and heart rate ranging 

between .


He has an echo from 07/22/2020 showed ejection fraction of 50-55%.  BNP is el

evated at 3940.


Discontinue ceftriaxone.  Keep Zithromax for now.  Patient to continue on 

Xarelto 


patient is nothing by mouth WE'LL KEEP HIM ON GENTLE HYDRATION AT 50 ML/H.  ALSO

GOT 1 DOSE OF LASIX





12/14/2020


Patient is seen and evaluated and follow-up continues to be closely monitored in

the ICU.  Patient has been taken off the BiPAP and placed on Airvo with an 

oxygen rate of 60 and FiO2 at 85% and is currently 91%.  To continue to wean as 

tolerated with the use of BiPAP as needed as well.  Patient is afebrile.  

Patient's d-dimer continues to be elevated and has increased at 11.67 and rodrigo

ent is maintained on Xarelto and will continue at this time.  Patient is eating 

today and tolerating with no reports of nausea or vomiting noted.  Multiple 

medical consultations following including cardiology.  Cardizem drip has been 

decreased to 10 and will continue to monitor this patient continues on 

amiodarone as well.  Heart rate currently in the 70s.  Blood sugars being 

controlled and will continue sliding scale along with long-acting at this time. 

Patient to continue with dexamethasone along with vitamin C and D and zinc 

supplements.  Patient is currently off antibiotics and will continue to monitor 

closely.  Chest x-ray today shows cardiomegaly with bilateral multifocal acute 

infiltrates greatest in the periphery, organizing consolidation in the lower 

lungs with no significant change. 





12/15/2020


Patient seen in follow-up continues to be closely monitored in the ICU.  Patient

is maintained on BiPAP at night and transitioning to Airvo during the day.  

Patient was placed on insulin drip and will continue to monitor Accu-Cheks 

closely.  Patient continues to be severely dyspneic with any exertion and is 89-

90% with an O2 flow of 60 and FiO2 of 85.  IV Cardizem drip has been d

iscontinued and patient is maintained on amiodarone oral and will continue at 

this time.  Anticoagulation of Xarelto continues.  Chest x-rays continue to show

worsening and consistent with Covid 19 infection progression. 





12/16/2020


Patient continues to be in the ICU and maintained on BiPAP with airvo during the

day and not really showing any improvements as far as oxygenation.  Patient  

states his breathing has not worsened but is also not improved.  Patient is 

hungry and awaiting to be switched to airvo so he can eat.  Blood sugars 

continue to be elevated and insulin drip was discontinued.  Will increase 

Levemir to 20 units daily and continue with sliding scale.  Patient remains off 

Cardizem drip and is currently on oral amiodarone and will continue at this 

time.  D-dimer also continues to be elevated and is currently 14.01.  Patient is

maintained on Xarelto.  Current sodium is 135, potassium is 4.1, creatinine is 

0.85, WBC is 13.4.  Discussed with the patient about CODE STATUS and patient 

wishes to remain a full code.  Chest x-ray continues to show no improvement from

previous x-ray.





12/17/2020


Patient currently remains in the ICU although awaiting to be transferred to 

[facility].  Patient continues on the Airvo with BiPAP at night.  Patient states 

he was having some right-sided weakness and facial droop since yesterday and CT 

angio done showing atrophy with very ventricular white matter ischemic changes 

with no acute intracranial process, normal [place] of [place], and no flow-

limiting stenosis of the bilateral carotid bifurcations with minimal 

atheromatous calcifications present.  Neurology was consulted for evaluation and

is currently pending.  Patient's chest x-ray today continues to show patchy 

diffuse infiltrates present bilaterally.  Patient is currently tolerating diet 

and remains on sliding scale along with long-acting and will continue at this 

time.





12/18/2020


Patient is seen in follow-up currently on [facility] is a recent transfer to the 

ICU.  Patient is maintained on Airvo and continued on this throughout the night.

 BiPAP was not used.  Patient states his breathing feels somewhat better today. 

Neurology evaluating the patient recommending an MRI which is currently pending 

at this time as patient had some right-sided weakness along with slurred speech.

 Right side upper extremity strength is 4/5 with no deficits noted on the right 

lower extremity.  No slurring of speech noted as well.  Patient is tolerating 

diet with no difficulty in swallowing.  Patient also underwent 2-D echo showing 

mild concentric left ventricular hypertrophy with mild local hypokinesis with 

overall left ventricular systolic function is mildly impaired with an EF between

45 and 50% along with some mitral and tricuspid regurgitation present.  Sugars 

being closely monitored and adjustments have been made to long-acting and will 

continue sliding scale as well.  Patient remains on IV dexamethasone along with 

Xarelto at this time. 








CONSTITUTIONAL: No fever, no malaise, no fatigue. 


HEENT: No recent visual problems or hearing problems. Denied any sore throat. 


CARDIOVASCULAR: No  orthopnea, PND, no palpitations, no syncope. 


PULMONARY: reports continued shortness of breath, no cough, no hemoptysis. 


GASTROINTESTINAL: No diarrhea, no nausea, no vomiting, no abdominal pain. 

Normoactive bowel sounds. 


NEUROLOGICAL: No headaches, reports weakness,  right upper extremity weakness 

with improvement, no numbness. 





Objective





- Vital Signs


Vital signs: 


                                   Vital Signs











Temp  97.6 F   12/18/20 09:00


 


Pulse  64   12/18/20 09:00


 


Resp  20   12/18/20 09:00


 


BP  136/71   12/18/20 09:00


 


Pulse Ox  96   12/18/20 09:00








                                 Intake & Output











 12/17/20 12/18/20 12/18/20





 18:59 06:59 18:59


 


Intake Total 536 600 400


 


Output Total 920 2200 475


 


Balance -384 -1600 -75


 


Weight 94.8 kg 94.6 kg 


 


Intake:   


 


   600 


 


    Sodium Chloride 0.9% 1, 300 600 





    000 ml @ 50 mls/hr IV .   





    Q20H [facility] Rx#:[id number]   


 


  Oral 236  400


 


Output:   


 


  Urine 920 2200 475


 


Other:   


 


  Voiding Method Urinal Urinal 


 


  # Voids 1  


 


  # Bowel Movements 1  














- Exam





GENERAL: The patient is alert and oriented x3, not in any acute distress. Well 

developed, well nourished.  Presently on an airvo 


HEENT: Pupils are round and equally reacting to light. EOMI. No scleral icterus.

No conjunctival pallor. Normocephalic, atraumatic. No pharyngeal erythema. No 

thyromegaly. 


CARDIOVASCULAR: S1 and S2 present. No murmurs, rubs, or gallops. 


PULMONARY: Diminished breath sounds bilaterally with a few scattered crackles 

noted


ABDOMEN: Soft, nontender, nondistended, normoactive bowel sounds. No palpable 

organomegaly. 


MUSCULOSKELETAL: No joint swelling or deformity. 


EXTREMITIES: No cyanosis, clubbing, or pedal edema. 


NEUROLOGICAL: Gross neurological examination did not reveal any focal deficits. 

Able to move right upper and lower extremity with no deficits noted.  Right side

upper extremity strength 4/5, left side upper extremity strength 5/5.  No 

slurring of his speech noted in right side of the face does not appear to be 

drooping on exam


SKIN: No rashes. no petechiae.





- Labs


CBC & Chem 7: 


                                 12/18/20 06:56





                                 12/18/20 06:56


Labs: 


                  Abnormal Lab Results - Last 24 Hours (Table)











  12/17/20 12/17/20 12/17/20 Range/Units





  03:55 12:03 16:04 


 


WBC     (3.8-10.6)  k/uL


 


Neutrophils #     (1.3-7.7)  k/uL


 


Lymphocytes #     (1.0-4.8)  k/uL


 


Sodium     (137-145)  mmol/L


 


BUN     (9-20)  mg/dL


 


Glucose     (74-99)  mg/dL


 


POC Glucose (mg/dL)   223 H  335 H  (75-99)  mg/dL


 


Ferritin  1054.3 H    (22.0-322.0)  ng/mL


 


ALT     (4-49)  U/L


 


Lactate Dehydrogenase     (313-618)  U/L


 


Creatine Kinase     ()  U/L


 


C-Reactive Protein     (<10.0)  mg/L


 


Total Protein     (6.3-8.2)  g/dL


 


Albumin     (3.5-5.0)  g/dL


 


LDL Cholesterol, Calc     (0-99)  mg/dL


 


HDL Cholesterol     (40-60)  mg/dL














  12/17/20 12/17/20 12/18/20 Range/Units





  16:15 20:40 00:02 


 


WBC     (3.8-10.6)  k/uL


 


Neutrophils #     (1.3-7.7)  k/uL


 


Lymphocytes #     (1.0-4.8)  k/uL


 


Sodium     (137-145)  mmol/L


 


BUN     (9-20)  mg/dL


 


Glucose     (74-99)  mg/dL


 


POC Glucose (mg/dL)  278 H  213 H  282 H  (75-99)  mg/dL


 


Ferritin     (22.0-322.0)  ng/mL


 


ALT     (4-49)  U/L


 


Lactate Dehydrogenase     (313-618)  U/L


 


Creatine Kinase     ()  U/L


 


C-Reactive Protein     (<10.0)  mg/L


 


Total Protein     (6.3-8.2)  g/dL


 


Albumin     (3.5-5.0)  g/dL


 


LDL Cholesterol, Calc     (0-99)  mg/dL


 


HDL Cholesterol     (40-60)  mg/dL














  12/18/20 12/18/20 12/18/20 Range/Units





  04:05 06:56 06:56 


 


WBC    13.7 H  (3.8-10.6)  k/uL


 


Neutrophils #    12.8 H  (1.3-7.7)  k/uL


 


Lymphocytes #    0.5 L  (1.0-4.8)  k/uL


 


Sodium   136 L   (137-145)  mmol/L


 


BUN   26 H   (9-20)  mg/dL


 


Glucose   166 H   (74-99)  mg/dL


 


POC Glucose (mg/dL)  209 H    (75-99)  mg/dL


 


Ferritin     (22.0-322.0)  ng/mL


 


ALT   61 H   (4-49)  U/L


 


Lactate Dehydrogenase   1485 H   (313-618)  U/L


 


Creatine Kinase   34 L   ()  U/L


 


C-Reactive Protein   14.7 H   (<10.0)  mg/L


 


Total Protein   5.6 L   (6.3-8.2)  g/dL


 


Albumin   2.7 L   (3.5-5.0)  g/dL


 


LDL Cholesterol, Calc   105 H   (0-99)  mg/dL


 


HDL Cholesterol   38 L   (40-60)  mg/dL














  12/18/20 Range/Units





  08:11 


 


WBC   (3.8-10.6)  k/uL


 


Neutrophils #   (1.3-7.7)  k/uL


 


Lymphocytes #   (1.0-4.8)  k/uL


 


Sodium   (137-145)  mmol/L


 


BUN   (9-20)  mg/dL


 


Glucose   (74-99)  mg/dL


 


POC Glucose (mg/dL)  163 H  (75-99)  mg/dL


 


Ferritin   (22.0-322.0)  ng/mL


 


ALT   (4-49)  U/L


 


Lactate Dehydrogenase   (313-618)  U/L


 


Creatine Kinase   ()  U/L


 


C-Reactive Protein   (<10.0)  mg/L


 


Total Protein   (6.3-8.2)  g/dL


 


Albumin   (3.5-5.0)  g/dL


 


LDL Cholesterol, Calc   (0-99)  mg/dL


 


HDL Cholesterol   (40-60)  mg/dL














Assessment and Plan


Assessment: 





Worsening Covid 19 pneumonia


Worsening acute hypoxic respiratory failure


A. fib with RVR, presently rate controlled


Possible lacunar stroke due to small vessel disease secondary to uncontrolled 

diabetes, neurology following an MRI pending


Fatigue generalized weakness secondary to Covid infection


New-onset diabetes


Hyponatremia due to dehydration, improved


Dehydration


Gastroenteritis secondary to covid infection


Hypomagnesemia, improved


Hypertension


History of MI


DVT prophylaxis: Xarelto


GI prophylaxis: Pepcid


Full code





Plan: 


Continue current medications and management.  Multiple medical consultations 

following.  Neurology following and has ordered an MRI of the brain for possible

stroke as patient has been experiencing some slurred speech and right side upper

extremity weakness.  No slurred speech noted on exam and right side upper 

extremity weakness has improved with a strength of 4/5 with no deficits noted of

the right lower extremity.  Adjustments to long-acting insulin have been made 

and will continue sliding scale and Accu-Cheks before meals at bedtime.  Patient

is maintained on airvo and will continue at this time.  Continue to wean FiO2 as

tolerated.  PT/OT to evaluate the patient as patient continues to be extremely 

weak.  Further recommendations to follow as per clinical course of the patient. 

Prognosis is poor and extremely guarded.





Time with Patient: Greater than 30

## 2020-12-18 NOTE — P.PN
Subjective


Progress Note Date: 12/18/20


Principal diagnosis: 


Acute hypoxic respiratory failure secondary to  COVID 19 pneumonia





On 12/13/2020, the patient got transferred to the intensive care unit overnight.

 I saw him in a medical floor and the patient was having acute hypoxic 

respiratory failure secondary to coronavirus/Covid 19 related pneumonia.  The 

patient was in the 100%.  After arriving to the intensive care unit, the patient

had to be placed on a BiPAP which is currently running at a pressure of 12/6 cm 

of water with an FiO2 of 20%.  His pulse ox currently is 96%.  Blood gases are 

pending from this morning.  He is resting comfortably on the BiPAP and he is 

able to tolerated without any major difficulties.  His cardiac rhythm has slowed

down.  He remains on atrial fibrillation.  He is on a Cardizem drip running at 

20 mg an hour.  He is also on metoprolol at a dose of 75 mg by mouth three a day

and amiodarone 400 mg by mouth twice a day.  Patient is on Xarelto.  D-dimer is 

elevated which is consistent with his coronavirus/Covid 19 related infection.  

His white cell count of 13.5.  His LDH level is at 904.  The proBNP level was 

3940 and his troponin was at 0.142.  I gave him a dose of Lasix yesterday.  His 

fluid balance over the past 24 hours has been -996 mL.  The chest x-ray still 

showing diffuse bilateral pulmonary infiltrates which is essentially unchanged 

compared to yesterday.  There are diffuse peripheral infiltrates bilaterally.





Reevaluated today on 12/14/20, patient is on BiPAP 12/6, FiO2 is 85%, patient 

seems to be comfortable, he is not in distress, asking to be fed, hence I will 

switch the patient to airvo, and continue to titrate accordingly to maintain O2 

saturation above 90% possible.  Patient remains on Cardizem drip for his atrial 

fibrillation, his rate is controlled, his IV fluid is a 0.9 normal saline.  CBC 

is relatively normal.  Inflammatory markers are elevated, LDH is 995 and his C-

reactive protein is 24.2.


*Normal renal profile is normal chest x-ray is quite abnormal showing diffuse 

interstitial infiltrates bilaterally.





Reevaluated today on 12/15/20, patient remains in the ICU, his pulmonary status 

is marginal at best.  Remains on high flow airvo, FiO2 of 85%, and 60 L flow.  

His O2 saturation is marginal between 90-93% at best.  Patient is on insulin 

drip mostly, 2 units per hour, his Cardizem is off, and he is now in sinus 

rhythm.  Patient completed his course of remdesivir, and he is on Decadron 6 mg 

every 12 hours, is also on Xarelto.  Patient is complaining of shortness of 

breath with any activity.  But looks comfortable at rest.  Remains on the Covid 

19 cocktail.  CBC showed leukocytosis with WBC count of 15.9 hemoglobin is 13.4.

 Basic metabolic profile and renal profile are normal





Reevaluated today on 12/16/20, remains in the ICU, patient is now on BiPAP, IPAP

is 12 EPAP 6 FiO2 is 85%, and his O2 saturation is 94%.  IV fluid is at 50 ML 

per hour, patient completed his treatment with remdesivir, remains on Decadron 6

mg IV push every 12 hours, and he remains on Xarelto.  He is also on the Covid 

19 cocktail.  His pulmonary status is marginal at best, patient will be given 

today a trial of high flow nasal cannula or airvo.  And will titrate 

accordingly.  Chest x-ray continues to show diffuse interstitial infiltrates.  

And some consolidation noted in the right lower lobe.  Inflammatory markers 

remain high LDH is 1456 C-reactive protein is 48.  Electrolytes are normal renal

profile is normal d-dimer is 14.01





On 12/17/2020 patient seen in follow-up in the intensive care unit, he still 

remains on Airvo at 60 L and FiO2 of 80%, his pulse ox is 93-97%, seems to be 

breathing comfortably, does not appear to be in any acute distress, his been 

afebrile overnight, hemodynamically has been stable, does have exertional dyspn

ea, the patient has been in bed for the most part.  No chest discomfort, today's

chest x-ray has been reviewed showing patchy diffuse infiltrates bilaterally.  

He remains on Decadron 6 mg twice daily, he is on Xarelto for anticoagulation, 

he is on 0.9 normal saline at a rate is 50 ML per hour, no other drips.  Patient

has completed a course of Remdesivir, remains on steroids, remains on oral antic

oagulation, today he was noted to have difficulty with right-sided weakness and 

facial droop, brain CT was completed showing atrophy with periventricular white 

matter ischemic changes, but no acute intracranial process.  No slurred speech, 

no difficulty swallowing.  Neurology has been consulted for neurologic 

evaluation, and right-sided weakness





On 12/18/2020 patient seen in follow-up on selective care unit, he remains on 

high flow oxygen at 60 L and FiO2 of 80%, his pulse ox is 95%, hemodynamically 

patient is stable, patient is afebrile.  Yesterday patient was noted to have 

right-sided weakness, and very slight right facial droop, neurology was 

consulted, his angiography CT of the brain showed atrophy with the 

periventricular white matter ischemic changes, no acute intracranial process, 

MRI of the brain scheduled for today, patient denies any worsening dyspnea, he 

is breathing comfortably, he is generally weak.  He is tolerating diet with no 

difficulty swallowing, patient had a 2-D echo showing mild concentric left 

ventricular hypertrophy with mild local hypokinesis and overall lethargy 

systolic function mildly impaired with an EF between 45 and 50% along with some 

mitral and tricuspid regurgitation.  Chest x-ray today shows coarse bilateral 

infiltrates without significant change.  LDH is relatively stable over the last 

3 days, at 1485, and his CRP is trending down and is down to 14.7 at today's 

labs.  Electrolytes and renal profile are relatively unremarkable.  Xarelto for 

anticoagulation for history of atrial fibrillation, his rate is currently 

controlled.











Objective





- Vital Signs


Vital signs: 


                                   Vital Signs











Temp  97.6 F   12/18/20 11:43


 


Pulse  60   12/18/20 11:43


 


Resp  24   12/18/20 11:43


 


BP  152/82   12/18/20 11:43


 


Pulse Ox  95   12/18/20 12:15








                                 Intake & Output











 12/17/20 12/18/20 12/18/20





 18:59 06:59 18:59


 


Intake Total 536 600 400


 


Output Total 920 2200 475


 


Balance -384 -1600 -75


 


Weight 94.8 kg 94.6 kg 


 


Intake:   


 


   600 


 


    Sodium Chloride 0.9% 1, 300 600 





    000 ml @ 50 mls/hr IV .   





    Q20H STEPH Rx#:[id number]   


 


  Oral 236  400


 


Output:   


 


  Urine 920 2200 475


 


Other:   


 


  Voiding Method Urinal Urinal 


 


  # Voids 1  


 


  # Bowel Movements 1  














- Exam


 GENERAL EXAM: Alert, very pleasant, 70-year-old white male, resting in bed, 

awake and alert, oriented 3, he is currently on high flow oxygen per Airvo  at 

60 L, and FiO2 of 80%, with pulse ox of 93-97%, comfortable in no apparent 

distress.


HEAD: Normocephalic/atraumatic.


EYES: Normal reaction of pupils, equal size.  Conjunctiva pink, sclera white.


NOSE: Clear with pink turbinates.


THROAT: No erythema or exudates.


NECK: No masses, no JVD, no thyroid enlargement, no adenopathy.


CHEST: No chest wall deformity.  Symmetrical expansion. 


LUNGS: Equal air entry with bilateral crackles, no wheeze, rhonchi or dullness.


CVS: Regular rate and rhythm, normal S1 and S2, no gallops, no murmurs, no rubs


ABDOMEN: Soft, nontender.  No hepatosplenomegaly, normal bowel sounds, no 

guarding or rigidity.


EXTREMITIES: No clubbing, no edema, no cyanosis, 2+ pulses and upper and lower 

extremities.


MUSCULOSKELETAL: Muscle strength and tone normal.


SPINE: No scoliosis or deformity


SKIN: No rashes


CENTRAL NERVOUS SYSTEM: Alert and oriented -3.  No focal deficits, tone is 

normal in all 4 extremities.


PSYCHIATRIC: Alert and oriented -3.  Appropriate affect.  Intact judgment and 

insight.











- Labs


CBC & Chem 7: 


                                 12/18/20 06:56





                                 12/18/20 06:56


Labs: 


                  Abnormal Lab Results - Last 24 Hours (Table)











  12/17/20 12/17/20 12/17/20 Range/Units





  16:04 16:15 20:40 


 


WBC     (3.8-10.6)  k/uL


 


Neutrophils #     (1.3-7.7)  k/uL


 


Lymphocytes #     (1.0-4.8)  k/uL


 


Sodium     (137-145)  mmol/L


 


BUN     (9-20)  mg/dL


 


Glucose     (74-99)  mg/dL


 


POC Glucose (mg/dL)  335 H  278 H  213 H  (75-99)  mg/dL


 


ALT     (4-49)  U/L


 


Lactate Dehydrogenase     (313-618)  U/L


 


Creatine Kinase     ()  U/L


 


C-Reactive Protein     (<10.0)  mg/L


 


Total Protein     (6.3-8.2)  g/dL


 


Albumin     (3.5-5.0)  g/dL


 


LDL Cholesterol, Calc     (0-99)  mg/dL


 


HDL Cholesterol     (40-60)  mg/dL














  12/18/20 12/18/20 12/18/20 Range/Units





  00:02 04:05 06:56 


 


WBC     (3.8-10.6)  k/uL


 


Neutrophils #     (1.3-7.7)  k/uL


 


Lymphocytes #     (1.0-4.8)  k/uL


 


Sodium    136 L  (137-145)  mmol/L


 


BUN    26 H  (9-20)  mg/dL


 


Glucose    166 H  (74-99)  mg/dL


 


POC Glucose (mg/dL)  282 H  209 H   (75-99)  mg/dL


 


ALT    61 H  (4-49)  U/L


 


Lactate Dehydrogenase    1485 H  (313-618)  U/L


 


Creatine Kinase    34 L  ()  U/L


 


C-Reactive Protein    14.7 H  (<10.0)  mg/L


 


Total Protein    5.6 L  (6.3-8.2)  g/dL


 


Albumin    2.7 L  (3.5-5.0)  g/dL


 


LDL Cholesterol, Calc    105 H  (0-99)  mg/dL


 


HDL Cholesterol    38 L  (40-60)  mg/dL














  12/18/20 12/18/20 12/18/20 Range/Units





  06:56 08:11 11:59 


 


WBC  13.7 H    (3.8-10.6)  k/uL


 


Neutrophils #  12.8 H    (1.3-7.7)  k/uL


 


Lymphocytes #  0.5 L    (1.0-4.8)  k/uL


 


Sodium     (137-145)  mmol/L


 


BUN     (9-20)  mg/dL


 


Glucose     (74-99)  mg/dL


 


POC Glucose (mg/dL)   163 H  233 H  (75-99)  mg/dL


 


ALT     (4-49)  U/L


 


Lactate Dehydrogenase     (313-618)  U/L


 


Creatine Kinase     ()  U/L


 


C-Reactive Protein     (<10.0)  mg/L


 


Total Protein     (6.3-8.2)  g/dL


 


Albumin     (3.5-5.0)  g/dL


 


LDL Cholesterol, Calc     (0-99)  mg/dL


 


HDL Cholesterol     (40-60)  mg/dL














Assessment and Plan


Plan: 


 Assessment:





#1.  Acute hypoxic respiratory failure related to acute COVID 19 pneumonitis





#2.  New onset atrial fibrillation with RVR, in sinus rhythm





#3.  New onset diabetes mellitus





#4.  Acute gastroenteritis secondary to COVID 19 infection





#5.  Benign essential hypertension





#6.  History of underlying coronary artery disease





#7.  Right upper lobe pulmonary embolism, and patient is on Xarelto





#8.  Troponin leak





#9.  History of gout





#10.  History of right upper lobe nodule to be monitored on an outpatient basis





#11.  New onset right-sided right arm weakness, a CT brain showed no acute 

intracranial process, rule out possibility of lacunar stroke





Plan:





Continue with current medical management, continue Decadron, continue oral 

anticoagulation, patient is afebrile, CRP trending down, LDH is relatively 

unchanged, no worsening dyspnea, still requiring high flow oxygen, weaning FiO2 

to keep O2 sat at 90% or better, MRI of the brain is pending, neurology evalua

tion is in progress.  We will continue to follow, consult physical therapy, on 

Atripla oxygenation pattern, monitor febrile pattern,


I performed a history & physical examination of the patient and discussed their 

management with my nurse practitioner, Peggy Tai.  I reviewed the nurse 

practitioner's note and agree with the documented findings and plan of care.  

Lung sounds are positive for diffuse wheezes throughout the lung fields.  The 

findings and the impression was discussed with the patient.  I attest to the 

documentation by the nurse practitioner. 











Time with Patient: Less than 30

## 2020-12-19 LAB
ALBUMIN SERPL-MCNC: 2.7 G/DL (ref 3.5–5)
ALP SERPL-CCNC: 84 U/L (ref 38–126)
ALT SERPL-CCNC: 63 U/L (ref 4–49)
ANION GAP SERPL CALC-SCNC: 2 MMOL/L
AST SERPL-CCNC: 30 U/L (ref 17–59)
BASOPHILS # BLD AUTO: 0 K/UL (ref 0–0.2)
BASOPHILS NFR BLD AUTO: 0 %
BUN SERPL-SCNC: 27 MG/DL (ref 9–20)
CALCIUM SPEC-MCNC: 8.6 MG/DL (ref 8.4–10.2)
CHLORIDE SERPL-SCNC: 104 MMOL/L (ref 98–107)
CO2 SERPL-SCNC: 27 MMOL/L (ref 22–30)
EOSINOPHIL # BLD AUTO: 0 K/UL (ref 0–0.7)
EOSINOPHIL NFR BLD AUTO: 0 %
ERYTHROCYTE [DISTWIDTH] IN BLOOD BY AUTOMATED COUNT: 4.63 M/UL (ref 4.3–5.9)
ERYTHROCYTE [DISTWIDTH] IN BLOOD: 13.4 % (ref 11.5–15.5)
GLUCOSE BLD-MCNC: 185 MG/DL (ref 75–99)
GLUCOSE BLD-MCNC: 212 MG/DL (ref 75–99)
GLUCOSE BLD-MCNC: 245 MG/DL (ref 75–99)
GLUCOSE BLD-MCNC: 278 MG/DL (ref 75–99)
GLUCOSE BLD-MCNC: 288 MG/DL (ref 75–99)
GLUCOSE BLD-MCNC: 315 MG/DL (ref 75–99)
GLUCOSE BLD-MCNC: 455 MG/DL (ref 75–99)
GLUCOSE SERPL-MCNC: 181 MG/DL (ref 74–99)
HCT VFR BLD AUTO: 42.7 % (ref 39–53)
HGB BLD-MCNC: 14.2 GM/DL (ref 13–17.5)
LYMPHOCYTES # SPEC AUTO: 0.4 K/UL (ref 1–4.8)
LYMPHOCYTES NFR SPEC AUTO: 3 %
MCH RBC QN AUTO: 30.7 PG (ref 25–35)
MCHC RBC AUTO-ENTMCNC: 33.3 G/DL (ref 31–37)
MCV RBC AUTO: 92.1 FL (ref 80–100)
MONOCYTES # BLD AUTO: 0.4 K/UL (ref 0–1)
MONOCYTES NFR BLD AUTO: 3 %
NEUTROPHILS # BLD AUTO: 12.6 K/UL (ref 1.3–7.7)
NEUTROPHILS NFR BLD AUTO: 94 %
PLATELET # BLD AUTO: 136 K/UL (ref 150–450)
POTASSIUM SERPL-SCNC: 4.5 MMOL/L (ref 3.5–5.1)
PROT SERPL-MCNC: 5.6 G/DL (ref 6.3–8.2)
SODIUM SERPL-SCNC: 133 MMOL/L (ref 137–145)
WBC # BLD AUTO: 13.4 K/UL (ref 3.8–10.6)

## 2020-12-19 RX ADMIN — DEXTROSE SCH MG: 50 INJECTION, SOLUTION INTRAVENOUS at 09:51

## 2020-12-19 RX ADMIN — METOPROLOL TARTRATE SCH MG: 25 TABLET, FILM COATED ORAL at 20:54

## 2020-12-19 RX ADMIN — AMIODARONE HYDROCHLORIDE SCH MG: 200 TABLET ORAL at 20:54

## 2020-12-19 RX ADMIN — INSULIN ASPART SCH UNIT: 100 INJECTION, SOLUTION INTRAVENOUS; SUBCUTANEOUS at 04:03

## 2020-12-19 RX ADMIN — INSULIN ASPART SCH UNIT: 100 INJECTION, SOLUTION INTRAVENOUS; SUBCUTANEOUS at 23:39

## 2020-12-19 RX ADMIN — RIVAROXABAN SCH MG: 20 TABLET, FILM COATED ORAL at 17:26

## 2020-12-19 RX ADMIN — METOPROLOL TARTRATE SCH MG: 25 TABLET, FILM COATED ORAL at 09:51

## 2020-12-19 RX ADMIN — INSULIN ASPART SCH UNIT: 100 INJECTION, SOLUTION INTRAVENOUS; SUBCUTANEOUS at 20:53

## 2020-12-19 RX ADMIN — CEFAZOLIN SCH: 330 INJECTION, POWDER, FOR SOLUTION INTRAMUSCULAR; INTRAVENOUS at 05:37

## 2020-12-19 RX ADMIN — INSULIN ASPART SCH UNIT: 100 INJECTION, SOLUTION INTRAVENOUS; SUBCUTANEOUS at 12:41

## 2020-12-19 RX ADMIN — INSULIN DETEMIR SCH UNIT: 100 INJECTION, SOLUTION SUBCUTANEOUS at 06:55

## 2020-12-19 RX ADMIN — AMIODARONE HYDROCHLORIDE SCH MG: 200 TABLET ORAL at 09:51

## 2020-12-19 RX ADMIN — OXYCODONE HYDROCHLORIDE AND ACETAMINOPHEN SCH MG: 500 TABLET ORAL at 09:51

## 2020-12-19 RX ADMIN — DEXTROSE SCH MG: 50 INJECTION, SOLUTION INTRAVENOUS at 20:54

## 2020-12-19 RX ADMIN — METOPROLOL TARTRATE SCH MG: 25 TABLET, FILM COATED ORAL at 17:26

## 2020-12-19 RX ADMIN — Medication SCH MG: at 09:51

## 2020-12-19 RX ADMIN — INSULIN ASPART SCH UNIT: 100 INJECTION, SOLUTION INTRAVENOUS; SUBCUTANEOUS at 08:25

## 2020-12-19 RX ADMIN — INSULIN ASPART SCH UNIT: 100 INJECTION, SOLUTION INTRAVENOUS; SUBCUTANEOUS at 17:25

## 2020-12-19 RX ADMIN — PANTOPRAZOLE SODIUM SCH MG: 40 TABLET, DELAYED RELEASE ORAL at 09:51

## 2020-12-19 NOTE — P.PN
Subjective


Progress Note Date: 12/19/20


Principal diagnosis: 





Acute hypoxic respiratory failure secondary to covid 19 pneumonia.


On 12/13/2020, the patient got transferred to the intensive care unit overnight.

 I saw him in a medical floor and the patient was having acute hypoxic 

respiratory failure secondary to coronavirus/Covid 19 related pneumonia.  The 

patient was in the 100%.  After arriving to the intensive care unit, the patient

had to be placed on a BiPAP which is currently running at a pressure of 12/6 cm 

of water with an FiO2 of 20%.  His pulse ox currently is 96%.  Blood gases are 

pending from this morning.  He is resting comfortably on the BiPAP and he is 

able to tolerated without any major difficulties.  His cardiac rhythm has slowed

down.  He remains on atrial fibrillation.  He is on a Cardizem drip running at 

20 mg an hour.  He is also on metoprolol at a dose of 75 mg by mouth three a day

and amiodarone 400 mg by mouth twice a day.  Patient is on Xarelto.  D-dimer is 

elevated which is consistent with his coronavirus/Covid 19 related infection.  

His white cell count of 13.5.  His LDH level is at 904.  The proBNP level was 

3940 and his troponin was at 0.142.  I gave him a dose of Lasix yesterday.  His 

fluid balance over the past 24 hours has been -996 mL.  The chest x-ray still 

showing diffuse bilateral pulmonary infiltrates which is essentially unchanged 

compared to yesterday.  There are diffuse peripheral infiltrates bilaterally.





Reevaluated today on 12/14/20, patient is on BiPAP 12/6, FiO2 is 85%, patient 

seems to be comfortable, he is not in distress, asking to be fed, hence I will 

switch the patient to airvo, and continue to titrate accordingly to maintain O2 

saturation above 90% possible.  Patient remains on Cardizem drip for his atrial 

fibrillation, his rate is controlled, his IV fluid is a 0.9 normal saline.  CBC 

is relatively normal.  Inflammatory markers are elevated, LDH is 995 and his C-

reactive protein is 24.2.


*Normal renal profile is normal chest x-ray is quite abnormal showing diffuse 

interstitial infiltrates bilaterally.





Reevaluated today on 12/15/20, patient remains in the ICU, his pulmonary status 

is marginal at best.  Remains on high flow airvo, FiO2 of 85%, and 60 L flow.  

His O2 saturation is marginal between 90-93% at best.  Patient is on insulin 

drip mostly, 2 units per hour, his Cardizem is off, and he is now in sinus 

rhythm.  Patient completed his course of remdesivir, and he is on Decadron 6 mg 

every 12 hours, is also on Xarelto.  Patient is complaining of shortness of 

breath with any activity.  But looks comfortable at rest.  Remains on the Covid 

19 cocktail.  CBC showed leukocytosis with WBC count of 15.9 hemoglobin is 13.4.

 Basic metabolic profile and renal profile are normal





Reevaluated today on 12/16/20, remains in the ICU, patient is now on BiPAP, IPAP

is 12 EPAP 6 FiO2 is 85%, and his O2 saturation is 94%.  IV fluid is at 50 ML 

per hour, patient completed his treatment with remdesivir, remains on Decadron 6

mg IV push every 12 hours, and he remains on Xarelto.  He is also on the Covid 

19 cocktail.  His pulmonary status is marginal at best, patient will be given 

today a trial of high flow nasal cannula or airvo.  And will titrate 

accordingly.  Chest x-ray continues to show diffuse interstitial infiltrates.  

And some consolidation noted in the right lower lobe.  Inflammatory markers 

remain high LDH is 1456 C-reactive protein is 48.  Electrolytes are normal renal

profile is normal d-dimer is 14.01





On 12/17/2020 patient seen in follow-up in the intensive care unit, he still 

remains on Airvo at 60 L and FiO2 of 80%, his pulse ox is 93-97%, seems to be 

breathing comfortably, does not appear to be in any acute distress, his been 

afebrile overnight, hemodynamically has been stable, does have exertional dyspn

ea, the patient has been in bed for the most part.  No chest discomfort, today's

chest x-ray has been reviewed showing patchy diffuse infiltrates bilaterally.  

He remains on Decadron 6 mg twice daily, he is on Xarelto for anticoagulation, 

he is on 0.9 normal saline at a rate is 50 ML per hour, no other drips.  Patient

has completed a course of Remdesivir, remains on steroids, remains on oral antic

oagulation, today he was noted to have difficulty with right-sided weakness and 

facial droop, brain CT was completed showing atrophy with periventricular white 

matter ischemic changes, but no acute intracranial process.  No slurred speech, 

no difficulty swallowing.  Neurology has been consulted for neurologic 

evaluation, and right-sided weakness





On 12/18/2020 patient seen in follow-up on selective care unit, he remains on 

high flow oxygen at 60 L and FiO2 of 80%, his pulse ox is 95%, hemodynamically 

patient is stable, patient is afebrile.  Yesterday patient was noted to have 

right-sided weakness, and very slight right facial droop, neurology was 

consulted, his angiography CT of the brain showed atrophy with the 

periventricular white matter ischemic changes, no acute intracranial process, 

MRI of the brain scheduled for today, patient denies any worsening dyspnea, he 

is breathing comfortably, he is generally weak.  He is tolerating diet with no 

difficulty swallowing, patient had a 2-D echo showing mild concentric left 

ventricular hypertrophy with mild local hypokinesis and overall lethargy 

systolic function mildly impaired with an EF between 45 and 50% along with some 

mitral and tricuspid regurgitation.  Chest x-ray today shows coarse bilateral 

infiltrates without significant change.  LDH is relatively stable over the last 

3 days, at 1485, and his CRP is trending down and is down to 14.7 at today's 

labs.  Electrolytes and renal profile are relatively unremarkable.  Xarelto for 

anticoagulation for history of atrial fibrillation, his rate is currently 

controlled.





Reevaluated today on 12/19/20, patient is now on the regular medical floor, 

remains on high flow oxygen at 60 L/m, and FiO2 is at 75%.  O2 saturation is 

ranging between 95 up to 97%.  Patient is feeling a bit better, breathing 

easier.  He is afebrile with a temp of 98.4, blood pressure is 152/80, 

respiration is 20.  WBC count today is 13.4, hemoglobin is 14.2, a left lites 

are normal renal profile is normal.  Chest x-ray continues to show diffuse 

pleural parenchymal changes, and interstitial infiltrates, difficult to rule out

any component of interstitial edema considering the findings of covid19 pneumoni

tis.  Patient did receive a dose of Lasix 40 mg IV push 1 today.








Objective





- Vital Signs


Vital signs: 


                                   Vital Signs











Temp  98.4 F   12/19/20 07:30


 


Pulse  50 L  12/19/20 11:00


 


Resp  20   12/19/20 11:00


 


BP  132/83   12/19/20 11:00


 


Pulse Ox  97   12/19/20 11:00








                                 Intake & Output











 12/18/20 12/19/20 12/19/20





 18:59 06:59 18:59


 


Intake Total 1580  240


 


Output Total 1750 2325 925


 


Balance -170 -8604 -680


 


Weight  82 kg 


 


Intake:   


 


    


 


    Sodium Chloride 0.9% 1, 400  





    000 ml @ 50 mls/hr IV .   





    Q20H [facility] Rx#:[id number]   


 


  Oral 1180  240


 


Output:   


 


  Urine 1750 2325 925


 


Other:   


 


  Voiding Method  Urinal 


 


  # Voids  1 


 


  # Bowel Movements 1  














- Exam


GENERAL: The patient is alert and oriented x3, on airvo


HEENT: Pupils are round and equally reacting to light. EOMI. No scleral icterus.

No conjunctival pallor. Normocephalic, atraumatic. No pharyngeal erythema. No 

thyromegaly. 


CARDIOVASCULAR: S1 and S2 present. No murmurs, rubs, or gallops. 


PULMONARY: Crackles at the bases, no rhonchi and no wheezes.


ABDOMEN: Soft, nontender, nondistended, normoactive bowel sounds. No palpable 

organomegaly. 


MUSCULOSKELETAL: No joint swelling or deformity. 


EXTREMITIES: No clubbing 1+ bipedal edema or cyanosis


NEUROLOGICAL: Alert and oriented 3, no gross focal deficits


SKIN: No rashes. no petechiae.


Psychiatric: Normal mood, affect and normal mental status examination











- Labs


CBC & Chem 7: 


                                 12/19/20 07:19





                                 12/19/20 07:19


Labs: 


                  Abnormal Lab Results - Last 24 Hours (Table)











  12/18/20 12/18/20 12/18/20 Range/Units





  06:56 16:00 20:32 


 


WBC     (3.8-10.6)  k/uL


 


Plt Count     (150-450)  k/uL


 


Neutrophils #     (1.3-7.7)  k/uL


 


Lymphocytes #     (1.0-4.8)  k/uL


 


Sodium     (137-145)  mmol/L


 


BUN     (9-20)  mg/dL


 


Glucose     (74-99)  mg/dL


 


POC Glucose (mg/dL)   227 H  307 H  (75-99)  mg/dL


 


Ferritin  886.1 H    (22.0-322.0)  ng/mL


 


ALT     (4-49)  U/L


 


Total Protein     (6.3-8.2)  g/dL


 


Albumin     (3.5-5.0)  g/dL














  12/18/20 12/19/20 12/19/20 Range/Units





  23:48 03:44 06:43 


 


WBC     (3.8-10.6)  k/uL


 


Plt Count     (150-450)  k/uL


 


Neutrophils #     (1.3-7.7)  k/uL


 


Lymphocytes #     (1.0-4.8)  k/uL


 


Sodium     (137-145)  mmol/L


 


BUN     (9-20)  mg/dL


 


Glucose     (74-99)  mg/dL


 


POC Glucose (mg/dL)  263 H  185 H  212 H  (75-99)  mg/dL


 


Ferritin     (22.0-322.0)  ng/mL


 


ALT     (4-49)  U/L


 


Total Protein     (6.3-8.2)  g/dL


 


Albumin     (3.5-5.0)  g/dL














  12/19/20 12/19/20 12/19/20 Range/Units





  07:19 07:19 08:16 


 


WBC  13.4 H    (3.8-10.6)  k/uL


 


Plt Count  136 L    (150-450)  k/uL


 


Neutrophils #  12.6 H    (1.3-7.7)  k/uL


 


Lymphocytes #  0.4 L    (1.0-4.8)  k/uL


 


Sodium   133 L   (137-145)  mmol/L


 


BUN   27 H   (9-20)  mg/dL


 


Glucose   181 H   (74-99)  mg/dL


 


POC Glucose (mg/dL)    278 H  (75-99)  mg/dL


 


Ferritin     (22.0-322.0)  ng/mL


 


ALT   63 H   (4-49)  U/L


 


Total Protein   5.6 L   (6.3-8.2)  g/dL


 


Albumin   2.7 L   (3.5-5.0)  g/dL














  12/19/20 Range/Units





  11:45 


 


WBC   (3.8-10.6)  k/uL


 


Plt Count   (150-450)  k/uL


 


Neutrophils #   (1.3-7.7)  k/uL


 


Lymphocytes #   (1.0-4.8)  k/uL


 


Sodium   (137-145)  mmol/L


 


BUN   (9-20)  mg/dL


 


Glucose   (74-99)  mg/dL


 


POC Glucose (mg/dL)  288 H  (75-99)  mg/dL


 


Ferritin   (22.0-322.0)  ng/mL


 


ALT   (4-49)  U/L


 


Total Protein   (6.3-8.2)  g/dL


 


Albumin   (3.5-5.0)  g/dL














Assessment and Plan


Assessment: 





Impression:


Acute hypoxic respiratory failure


Acute covid 19 pneumonitis.


New onset Atrial fibrillation with RVR.


New-onset diabetes.


Acute gastroenteritis secondary to Covid infection


Benign essential hypertension.


History of underlying coronary artery disease.


Right upper lobe pulmonary embolism, patient is on Xarelto.


Troponin leak.


History of gout.


History of right upper lobe nodule to be monitored on outpatient basis.


New-onset right sided arm weakness, CT of the brain is nondiagnostic.





Recommendation:


Continue Decadron.


done with remdesivir


Continue to titrate oxygen


Continue diuretics.  Agree with Lasix given today by admitting physician.


Continue obeta blockers.


Continue Xarelto.


Continue GI and DVT prophylaxis.


Not ready at this point for any discharge planning.  We'll continue to follow.








Time with Patient: Less than 30

## 2020-12-19 NOTE — XR
EXAMINATION TYPE: XR chest 1V portable

 

DATE OF EXAM: 12/19/2020

 

COMPARISON: 12/18/2020

 

HISTORY: Shortness of breath

 

TECHNIQUE: Single frontal view of the chest is obtained.

 

FINDINGS:  Diffuse mixed airspace and interstitial infiltrates with tiny effusion and cardiomegaly st
able. No sizable pneumothorax. Arthropathy of the shoulders.

 

IMPRESSION:  Stable diffuse pleural-parenchymal changes predominantly interstitial correlate for atyp
ical pneumonia otherwise consider CHF.

## 2020-12-19 NOTE — P.PN
Subjective





70-year-old male,  who is a patient of Dr. Stovall with a past medical history 

of hypertension,MI coming in with a chief complaint of fatigue and generalized 

weakness that have been ongoing for past 1 week.  Patient states that he has 

been having poor appetite loss of smell and loss of taste for the past 1 week.  

He states that he has been sleeping for more than 20 hours a day.  Patient 

denies having any cough or difficulty in breathing.  He denies having any chest 

pain or palpitations.  He denies having any dysuria or hematuria.  Patient den

ies having any history of diabetes but his blood sugars are in 300s to 400s.  In

the emergency room patient had a chest x-ray showing no acute cardiopulmonary 

process and an EKG showing normal sinus rhythm.  He had labs done showing white 

count of 3.9, hemoglobin 14.4, platelets 166.  Sodium 131, potassium 3.9, 

chloride.  BUN 15, creatinine 0.93 C-reactive protein 59, albumin 3.3 

coronavirus PCR positive. 





On 12/6/2020 patient was seen and examined.  He is currently resting comfortably

in bed.  Appears to be no acute distress.  On reviewing the vitals patient 

continues to fever as high as 102.3.  His blood pressure has been stable around 

130s 80s and he saturating at 95% on room air.  Patient's blood sugars have been

running on the higher side.





On 12/07/2020- patient was seen and examined and the general medical floors.  He

is resting comfortably in bed.  Overnight active issues reported by nursing 

staff. Patient states that his difficulty in breathing is improving.  On 

reviewing the vitals patient's T-max is 98.4, heart rate 72, respiratory rate 

127-79 and saturating at 93% on room air.  On reviewing vitals patient's white 

count of 2.8, hemoglobin 14.4, platelets 189.  Sodium 136, potassium 4.6, 

chloride 102.  BUN 23 and creatinine of 1.0.  Blood sugars have been running 

high in 200s to 300s.  C-reactive protein 7.5.








12/08/2020


This is a pleasant 70 years old male who presents with cough with infection and 

found to have new onset diabetes with elevated hemoglobin A1c at 14%.


Patient currently with no respiratory symptoms, no chest pain or dyspnea 

coughing.  Yesterday he has a regular bowel movement but today he had 1 loose 

bowel movement but no significant abdominal pain or nausea vomiting.


Also he is running a fever of 101.  He saturating 90% at room air.


BMP and CBC were reviewed and they were unremarkable except for mild leukopenia 

2.8K.  D-dimer is negative at 0.34.  Coronavirus is detected.  Chest x-ray is 

normal


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  Normal saline at 75 mL/h was 

added.  Also Levemir 10 units daily and metformin 1000 mg twice daily has been 

added because his sugar still not controlled


Patient is able to eat % of his meal





12/9/20


Patient had a rough night because of fever, his breathing is quiet and stable 

and he's only on 2 L oxygen.  Is still have diarrhea about twice per day which 

is similar to the day before.  No abdominal pain or vomiting.  No significant 

coughing.  unstable


Chest x-ray: Worsening infiltrates, Pronecalcitonin is elevated at 0.27, 

patient is started on Rocephin and Zithromax.


He is new onset diabetes and also is on dexamethasone and his sugar more than 

300, so Levemir was decreased from 10-30 units daily.  Also he is on metformin 

1000 and Amaryl 1 mg.


Continue on Xarelto, normocephalic 75, remedisivr, dexamethasone 6 mg daily.





12/10/2020


Patient hadn't good night  because he could not sleep.  No respiratory symptoms,

ongoing generalized weakness.


Yesterday he has watery diarrhea but this morning he had small more formed bowel

movement.  No abdominal pain.


Persistent fever have subsided and his been afebrile for more than 24 hours.  We

will check Tylenol as needed


His sugar is better controlled after increasing his insulin to 30 units.


Antibiotics with ceftriaxone and Zithromax were admitted with high 

proteincalcitonin.  We will try to check sputum culture.


Check labs in the morning





12/11/2020


Patient feels much better today, his fever subsided and his on Tylenol as needed

currently.  No respiratory issue or symptom.  He had a little formed bowel 

movement today.  Currently patient remains on the same cocktail for comfort 

infection besides remedisivr


Hemodynamically stable.  Vitals are stable showing mild leukocytosis of 11.6 K





12/12/2020


pt is moved to [facility] unit , pt developed a fib and RVR and he was started on 

Cardizem and amiodarone drip and is already on Xarelto. 


Also patient on Lopressor 75 mg 3 times a day Also he developed more hypoxic and

is currently on 50 L oxygen via nasal cannula saturating 90%.  


Repeat chest x-ray Moderate peripheral-based opacities throughout both lungs 

with no pleural effusion.  Blood gas on BOTH HIS WITH PH 7.48, LOW PCO2 OF 22 

AND LOW PO2 AT 58 PATIENT HAS BEEN EVALUATED BY PULMONARY SERVICE AND PATIENT 

MAY NEED TO GO TO THE ICU AS WELL.


LEFT SHOWING STABLE LEUKOCYTOSIS AT 14.9 K, ELEVATED D-DIMER 3.1, BMP IS 

UNREMARKABLE AND WORSENING OF THE EDGE AND C-REACTIVE PROTEIN


Patient is kept on remedisivr, dexamethasone 6 mg daily.


However patient diarrhea has improved 








12/13/2020


Patient today kept in the ICU on BiPAP saturating 100% however he looks 

comfortable.  Distal short of breath and dyspneic


Rest of vitals are stable


Laps looks stable, inflammatory markers LDH and C-reactive protein still 

trending up slowly compared to yesterday.proCalcitonin is negative.  D-dimer is

trending up at 5.7


Chest x-ray showing stable bilateral lung infiltrates


He remains on Decadron and remdesivir


Also I kept on Cardizem drip at 20 mg an hour and amiodarone 400 mg twice daily 

and metoprolol 75 mg 3 times a day, vitamin C and A. fib and heart rate ranging 

between .


He has an echo from 07/22/2020 showed ejection fraction of 50-55%.  BNP is 

elevated at 3940.


Discontinue ceftriaxone.  Keep Zithromax for now.  Patient to continue on 

Xarelto 


patient is nothing by mouth WE'LL KEEP HIM ON GENTLE HYDRATION AT 50 ML/H.  ALSO

GOT 1 DOSE OF LASIX





12/14/2020


Patient is seen and evaluated and follow-up continues to be closely monitored in

the ICU.  Patient has been taken off the BiPAP and placed on Airvo with an oxy

gen rate of 60 and FiO2 at 85% and is currently 91%.  To continue to wean as 

tolerated with the use of BiPAP as needed as well.  Patient is afebrile.  

Patient's d-dimer continues to be elevated and has increased at 11.67 and 

patient is maintained on Xarelto and will continue at this time.  Patient is 

eating today and tolerating with no reports of nausea or vomiting noted.  

Multiple medical consultations following including cardiology.  Cardizem drip 

has been decreased to 10 and will continue to monitor this patient continues on 

amiodarone as well.  Heart rate currently in the 70s.  Blood sugars being 

controlled and will continue sliding scale along with long-acting at this time. 

Patient to continue with dexamethasone along with vitamin C and D and zinc 

supplements.  Patient is currently off antibiotics and will continue to monitor 

closely.  Chest x-ray today shows cardiomegaly with bilateral multifocal acute 

infiltrates greatest in the periphery, organizing consolidation in the lower 

lungs with no significant change. 





12/15/2020


Patient seen in follow-up continues to be closely monitored in the ICU.  Patient

is maintained on BiPAP at night and transitioning to Airvo during the day.  

Patient was placed on insulin drip and will continue to monitor Accu-Cheks 

closely.  Patient continues to be severely dyspneic with any exertion and is 89-

90% with an O2 flow of 60 and FiO2 of 85.  IV Cardizem drip has been 

discontinued and patient is maintained on amiodarone oral and will continue at 

this time.  Anticoagulation of Xarelto continues.  Chest x-rays continue to show

worsening and consistent with Covid 19 infection progression. 





12/16/2020


Patient continues to be in the ICU and maintained on BiPAP with airvo during the

day and not really showing any improvements as far as oxygenation.  Patient  

states his breathing has not worsened but is also not improved.  Patient is hun

gry and awaiting to be switched to airvo so he can eat.  Blood sugars continue 

to be elevated and insulin drip was discontinued.  Will increase Levemir to 20 

units daily and continue with sliding scale.  Patient remains off Cardizem drip 

and is currently on oral amiodarone and will continue at this time.  D-dimer 

also continues to be elevated and is currently 14.01.  Patient is maintained on 

Xarelto.  Current sodium is 135, potassium is 4.1, creatinine is 0.85, WBC is 

13.4.  Discussed with the patient about CODE STATUS and patient wishes to remain

a full code.  Chest x-ray continues to show no improvement from previous x-ray.





12/17/2020


Patient currently remains in the ICU although awaiting to be transferred to 

[facility].  Patient continues on the Airvo with BiPAP at night.  Patient states 

he was having some right-sided weakness and facial droop since yesterday and CT 

angio done showing atrophy with very ventricular white matter ischemic changes 

with no acute intracranial process, normal [place], and no flow-

limiting stenosis of the bilateral carotid bifurcations with minimal 

atheromatous calcifications present.  Neurology was consulted for evaluation and

is currently pending.  Patient's chest x-ray today continues to show patchy 

diffuse infiltrates present bilaterally.  Patient is currently tolerating diet 

and remains on sliding scale along with long-acting and will continue at this 

time.





12/18/2020


Patient is seen in follow-up currently on [facility] is a recent transfer to the 

ICU.  Patient is maintained on Airvo and continued on this throughout the night.

 BiPAP was not used.  Patient states his breathing feels somewhat better today. 

Neurology evaluating the patient recommending an MRI which is currently pending 

at this time as patient had some right-sided weakness along with slurred speech.

 Right side upper extremity strength is 4/5 with no deficits noted on the right 

lower extremity.  No slurring of speech noted as well.  Patient is tolerating 

diet with no difficulty in swallowing.  Patient also underwent 2-D echo showing 

mild concentric left ventricular hypertrophy with mild local hypokinesis with 

overall left ventricular systolic function is mildly impaired with an EF between

45 and 50% along with some mitral and tricuspid regurgitation present.  Sugars 

being closely monitored and adjustments have been made to long-acting and will 

continue sliding scale as well.  Patient remains on IV dexamethasone along with 

Xarelto at this time. 





12/19/2020


Patient is awaiting for MRI chest x-ray is still showing significant the 

congestion possibility of pulmonary edema cannot be ruled out.  Patient is off 

Lasix nephrology and pulmonology is following the patient.  Asian may need a 

dose of Lasix early the decision to pulmonary patient remains on 6 L of oxygen. 

Saturating 97%.








CONSTITUTIONAL: No fever, no malaise, no fatigue. 


HEENT: No recent visual problems or hearing problems. Denied any sore throat. 


CARDIOVASCULAR: No  orthopnea, PND, no palpitations, no syncope. 


PULMONARY: reports continued shortness of breath, no cough, no hemoptysis. 


GASTROINTESTINAL: No diarrhea, no nausea, no vomiting, no abdominal pain. 

Normoactive bowel sounds. 


NEUROLOGICAL: No headaches, reports weakness,  right upper extremity weakness 

with improvement, no numbness. 





Objective





- Vital Signs


Vital signs: 


                                   Vital Signs











Temp  98.4 F   12/19/20 07:30


 


Pulse  53 L  12/19/20 07:30


 


Resp  20   12/19/20 07:30


 


BP  113/74   12/19/20 07:30


 


Pulse Ox  97   12/19/20 07:30








                                 Intake & Output











 12/18/20 12/19/20 12/19/20





 18:59 06:59 18:59


 


Intake Total 1580  


 


Output Total 1750 2325 


 


Balance -170 -2325 


 


Weight  82 kg 


 


Intake:   


 


    


 


    Sodium Chloride 0.9% 1, 400  





    000 ml @ 50 mls/hr IV .   





    Q20H STEPH Rx#:[id number]   


 


  Oral 1180  


 


Output:   


 


  Urine 1750 2325 


 


Other:   


 


  Voiding Method  Urinal 


 


  # Voids  1 


 


  # Bowel Movements 1  














- Exam








PHYSICAL EXAMINATION: 





GENERAL: The patient is alert and oriented x3, not in any acute distress. Well 

developed, well nourished. 


HEENT: Pupils are round and equally reacting to light. EOMI. No scleral icterus.

No conjunctival pallor. Normocephalic, atraumatic. No pharyngeal erythema. No 

thyromegaly. 


CARDIOVASCULAR: S1 and S2 present. No murmurs, rubs, or gallops. 


PULMONARY: Chest is clear to auscultation, no wheezing or crackles. 


ABDOMEN: Soft, nontender, nondistended, normoactive bowel sounds. No palpable 

organomegaly. 


MUSCULOSKELETAL: No joint swelling or deformity.


EXTREMITIES: No cyanosis, clubbing, or pedal edema. 


NEUROLOGICAL: Gross neurological examination did not reveal any focal deficits. 


SKIN: No rashes. 





Note: Because of COVID 19 isolation, some of the history and physical exam 

findings are indirect and obtained from nursing staff, and other physician 

examinations to avoid unnecessary contact with the patient.











- Labs


CBC & Chem 7: 


                                 12/19/20 07:19





                                 12/19/20 07:19


Labs: 


                  Abnormal Lab Results - Last 24 Hours (Table)











  12/18/20 12/18/20 12/18/20 Range/Units





  06:56 11:59 16:00 


 


WBC     (3.8-10.6)  k/uL


 


Plt Count     (150-450)  k/uL


 


Neutrophils #     (1.3-7.7)  k/uL


 


Lymphocytes #     (1.0-4.8)  k/uL


 


Sodium     (137-145)  mmol/L


 


BUN     (9-20)  mg/dL


 


Glucose     (74-99)  mg/dL


 


POC Glucose (mg/dL)   233 H  227 H  (75-99)  mg/dL


 


Ferritin  886.1 H    (22.0-322.0)  ng/mL


 


ALT     (4-49)  U/L


 


Total Protein     (6.3-8.2)  g/dL


 


Albumin     (3.5-5.0)  g/dL


 


LDL Cholesterol, Calc  105 H    (0-99)  mg/dL


 


HDL Cholesterol  38 L    (40-60)  mg/dL














  12/18/20 12/18/20 12/19/20 Range/Units





  20:32 23:48 03:44 


 


WBC     (3.8-10.6)  k/uL


 


Plt Count     (150-450)  k/uL


 


Neutrophils #     (1.3-7.7)  k/uL


 


Lymphocytes #     (1.0-4.8)  k/uL


 


Sodium     (137-145)  mmol/L


 


BUN     (9-20)  mg/dL


 


Glucose     (74-99)  mg/dL


 


POC Glucose (mg/dL)  307 H  263 H  185 H  (75-99)  mg/dL


 


Ferritin     (22.0-322.0)  ng/mL


 


ALT     (4-49)  U/L


 


Total Protein     (6.3-8.2)  g/dL


 


Albumin     (3.5-5.0)  g/dL


 


LDL Cholesterol, Calc     (0-99)  mg/dL


 


HDL Cholesterol     (40-60)  mg/dL














  12/19/20 12/19/20 12/19/20 Range/Units





  06:43 07:19 07:19 


 


WBC   13.4 H   (3.8-10.6)  k/uL


 


Plt Count   136 L   (150-450)  k/uL


 


Neutrophils #   12.6 H   (1.3-7.7)  k/uL


 


Lymphocytes #   0.4 L   (1.0-4.8)  k/uL


 


Sodium    133 L  (137-145)  mmol/L


 


BUN    27 H  (9-20)  mg/dL


 


Glucose    181 H  (74-99)  mg/dL


 


POC Glucose (mg/dL)  212 H    (75-99)  mg/dL


 


Ferritin     (22.0-322.0)  ng/mL


 


ALT    63 H  (4-49)  U/L


 


Total Protein    5.6 L  (6.3-8.2)  g/dL


 


Albumin    2.7 L  (3.5-5.0)  g/dL


 


LDL Cholesterol, Calc     (0-99)  mg/dL


 


HDL Cholesterol     (40-60)  mg/dL














  12/19/20 Range/Units





  08:16 


 


WBC   (3.8-10.6)  k/uL


 


Plt Count   (150-450)  k/uL


 


Neutrophils #   (1.3-7.7)  k/uL


 


Lymphocytes #   (1.0-4.8)  k/uL


 


Sodium   (137-145)  mmol/L


 


BUN   (9-20)  mg/dL


 


Glucose   (74-99)  mg/dL


 


POC Glucose (mg/dL)  278 H  (75-99)  mg/dL


 


Ferritin   (22.0-322.0)  ng/mL


 


ALT   (4-49)  U/L


 


Total Protein   (6.3-8.2)  g/dL


 


Albumin   (3.5-5.0)  g/dL


 


LDL Cholesterol, Calc   (0-99)  mg/dL


 


HDL Cholesterol   (40-60)  mg/dL














Assessment and Plan


Plan: 





 Covid 19 pneumonia


acute hypoxic respiratory failure


A. fib with RVR, presently rate controlled


Possible lacunar stroke due to small vessel disease secondary to uncontrolled 

diabetes, neurology following an MRI pending


Fatigue generalized weakness secondary to Covid infection


New-onset diabetes


-That his heart failure chronic systolic dysfunction with acute exacerbation 

patient has EF of around the 45-50% patient appears to be hypovolemic will give 

him Lasix


Hyponatremia .  Hypervolemic hyponatremia  Congestive heart failure improved


Gastroenteritis secondary to covid infection


Hypomagnesemia, improved


Hypertension


History of MI


DVT prophylaxis: Xarelto


GI prophylaxis: Pepcid


Full code





Plan: 


Continue current medications and management.  Multiple medical consultations 

following.  Neurology following and has ordered an MRI of the brain for possible

stroke as patient has been experiencing some slurred speech and right side upper

extremity weakness.  No slurred speech noted on exam and right side upper 

extremity weakness has improved with a strength of 4/5 with no deficits noted of

the right lower extremity.  Adjustments to long-acting insulin have been made 

and will continue sliding scale and Accu-Cheks before meals at bedtime.  Patient

is maintained on airvo and will continue at this time.  Continue to wean FiO2 as

tolerated.  PT/OT to evaluate the patient as patient continues to be extremely 

weak.  Further recommendations to follow as per clinical course of the patient. 

Prognosis is poor and extremely guarded.

## 2020-12-19 NOTE — P.PN
Subjective


Progress Note Date: 12/18/20





Patient was seen for a follow-up.  Continues to be in respiratory distress.  

Denies any new focal symptoms.





Objective





- Vital Signs


Vital signs: 


                                   Vital Signs











Temp  97.6 F   12/18/20 11:43


 


Pulse  60   12/18/20 11:43


 


Resp  24   12/18/20 11:43


 


BP  152/82   12/18/20 11:43


 


Pulse Ox  95   12/18/20 12:15








                                 Intake & Output











 12/17/20 12/18/20 12/18/20





 18:59 06:59 18:59


 


Intake Total 536 600 400


 


Output Total 920 2200 475


 


Balance -384 -1600 -75


 


Weight 94.8 kg 94.6 kg 


 


Intake:   


 


   600 


 


    Sodium Chloride 0.9% 1, 300 600 





    000 ml @ 50 mls/hr IV .   





    Q20H STEPH Rx#:[id number]   


 


  Oral 236  400


 


Output:   


 


  Urine 920 2200 475


 


Other:   


 


  Voiding Method Urinal Urinal 


 


  # Voids 1  


 


  # Bowel Movements 1  














- Exam





On examination patient is an elderly  male, laying in the bed, in mild 

to moderate respiratory distress.  Speech and language functions are normal.  

Cranial nerves significant for mild right facial asymmetry.  On muscle strength 

testing patient's strength is normal on the left side.  On the right side, 

deltoid 5-, biceps 5-, triceps 5,  4+5-, hip flexion 5-.  Sensory touch is 

equal.  Patient has less ataxia for finger-to-nose on the right.  Tone and bulk 

of muscles normal.  Gait deferred.





- Labs


CBC & Chem 7: 


                                 12/19/20 07:19





                                 12/19/20 07:19


Labs: 


                  Abnormal Lab Results - Last 24 Hours (Table)











  12/17/20 12/17/20 12/17/20 Range/Units





  16:04 16:15 20:40 


 


WBC     (3.8-10.6)  k/uL


 


Neutrophils #     (1.3-7.7)  k/uL


 


Lymphocytes #     (1.0-4.8)  k/uL


 


Sodium     (137-145)  mmol/L


 


BUN     (9-20)  mg/dL


 


Glucose     (74-99)  mg/dL


 


POC Glucose (mg/dL)  335 H  278 H  213 H  (75-99)  mg/dL


 


ALT     (4-49)  U/L


 


Lactate Dehydrogenase     (313-618)  U/L


 


Creatine Kinase     ()  U/L


 


C-Reactive Protein     (<10.0)  mg/L


 


Total Protein     (6.3-8.2)  g/dL


 


Albumin     (3.5-5.0)  g/dL


 


LDL Cholesterol, Calc     (0-99)  mg/dL


 


HDL Cholesterol     (40-60)  mg/dL














  12/18/20 12/18/20 12/18/20 Range/Units





  00:02 04:05 06:56 


 


WBC     (3.8-10.6)  k/uL


 


Neutrophils #     (1.3-7.7)  k/uL


 


Lymphocytes #     (1.0-4.8)  k/uL


 


Sodium    136 L  (137-145)  mmol/L


 


BUN    26 H  (9-20)  mg/dL


 


Glucose    166 H  (74-99)  mg/dL


 


POC Glucose (mg/dL)  282 H  209 H   (75-99)  mg/dL


 


ALT    61 H  (4-49)  U/L


 


Lactate Dehydrogenase    1485 H  (313-618)  U/L


 


Creatine Kinase    34 L  ()  U/L


 


C-Reactive Protein    14.7 H  (<10.0)  mg/L


 


Total Protein    5.6 L  (6.3-8.2)  g/dL


 


Albumin    2.7 L  (3.5-5.0)  g/dL


 


LDL Cholesterol, Calc    105 H  (0-99)  mg/dL


 


HDL Cholesterol    38 L  (40-60)  mg/dL














  12/18/20 12/18/20 12/18/20 Range/Units





  06:56 08:11 11:59 


 


WBC  13.7 H    (3.8-10.6)  k/uL


 


Neutrophils #  12.8 H    (1.3-7.7)  k/uL


 


Lymphocytes #  0.5 L    (1.0-4.8)  k/uL


 


Sodium     (137-145)  mmol/L


 


BUN     (9-20)  mg/dL


 


Glucose     (74-99)  mg/dL


 


POC Glucose (mg/dL)   163 H  233 H  (75-99)  mg/dL


 


ALT     (4-49)  U/L


 


Lactate Dehydrogenase     (313-618)  U/L


 


Creatine Kinase     ()  U/L


 


C-Reactive Protein     (<10.0)  mg/L


 


Total Protein     (6.3-8.2)  g/dL


 


Albumin     (3.5-5.0)  g/dL


 


LDL Cholesterol, Calc     (0-99)  mg/dL


 


HDL Cholesterol     (40-60)  mg/dL














Assessment and Plan


Assessment: 





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 

  stroke from small vessel disease. 


* New onset Atrial fibrillation, on anticoagulation with Xarelto.


* Acute COVID-19 pneumonitis


* New onset diabetes, poorly controlled


* Hypertension


* Hyperlipidemia


* History of CAD.





Plan: 





* Acute right hemiparesis, possible lacunar stroke from small vessel disease 

  from uncontrolled diabetes.  Right hemiparesis slightly better as compared to 

  yesterday.  Need to rule out cardio-embolic event from atrial fibrillation, 

  although patient has been on anticoagulation with Xarelto for atrial 

  fibrillation since 12/06/2020.  


* Await MRI of brain to localize CVA.


* No large vessel occlusion noted on CTA of head and neck.  


* Patient's diabetes is poorly controlled, need to optimize diabetes to target 

  A1c <7.0


* Fasting a.m. lipid panel showed cholesterol 159, , HDL 38 and 

  triglycerides 81.  We will start Lipitor 40 mg daily.


* Blood pressure is well controlled.


* 2-D echo revealed normal left-ventricular size.  Mild concentric LVH, mild 

  global hypokinesis of left ventricle with an EF 45-50% there is 

  paradoxical/dyssynergy septal motion consistent with postoperative status.  


* PT and OT when possible.  Patient at present sick with COVID-19 infection.


* Neurology service not available over the weekend.  Please perfect serve if any

  questions.


* Dr. Brandon Ruiz Will resume service on Monday.

## 2020-12-19 NOTE — PN
PROGRESS NOTE



DATE OF SERVICE:

12/19/2020



REASON FOR FOLLOWUP:

COVID-19 infection.



INTERVAL HISTORY:

Patient is currently afebrile.  Patient is breathing more comfortably.  The patient

denies having any chest pain.  No shortness of breath.  Minimal cough.  No nausea, no

vomiting.  No abdominal pain.  No diarrhea.



PHYSICAL EXAMINATION:

Blood pressure 122/73, with a pulse of 51.  Temperature 97.8.  He is 98% on 55% FiO2.

General description is an elderly male lying in bed in no distress.  Respiratory

system: Unlabored breathing, decreased breath sounds in the bases with no wheeze.

Heart S1, S2.  Regular rate and rhythm. Abdomen soft.  No tenderness.



LABS:

Hemoglobin is 14.1, white count 13.4, BUN of 27, creatinine 0.80.  Blood culture

negative.



IMPRESSION/PLAN:

Patient with acute COVID-19 pneumonia. Patient has completed Remdesivir therapy.

Currently on Dexamethasone, Xarelto and Zinc to continue along with respiratory

support.  Monitor clinical course closely.





MMODL / IJN: [id number] / Job#: [id number]

## 2020-12-20 LAB
GLUCOSE BLD-MCNC: 156 MG/DL (ref 75–99)
GLUCOSE BLD-MCNC: 189 MG/DL (ref 75–99)
GLUCOSE BLD-MCNC: 261 MG/DL (ref 75–99)
GLUCOSE BLD-MCNC: 262 MG/DL (ref 75–99)
GLUCOSE BLD-MCNC: 323 MG/DL (ref 75–99)
GLUCOSE BLD-MCNC: 333 MG/DL (ref 75–99)

## 2020-12-20 RX ADMIN — INSULIN ASPART SCH UNIT: 100 INJECTION, SOLUTION INTRAVENOUS; SUBCUTANEOUS at 08:37

## 2020-12-20 RX ADMIN — DEXTROSE SCH MG: 50 INJECTION, SOLUTION INTRAVENOUS at 20:43

## 2020-12-20 RX ADMIN — INSULIN DETEMIR SCH UNIT: 100 INJECTION, SOLUTION SUBCUTANEOUS at 06:20

## 2020-12-20 RX ADMIN — PANTOPRAZOLE SODIUM SCH MG: 40 TABLET, DELAYED RELEASE ORAL at 08:31

## 2020-12-20 RX ADMIN — METOPROLOL TARTRATE SCH MG: 50 TABLET, FILM COATED ORAL at 08:31

## 2020-12-20 RX ADMIN — INSULIN ASPART SCH UNIT: 100 INJECTION, SOLUTION INTRAVENOUS; SUBCUTANEOUS at 12:41

## 2020-12-20 RX ADMIN — RIVAROXABAN SCH MG: 20 TABLET, FILM COATED ORAL at 17:16

## 2020-12-20 RX ADMIN — OXYCODONE HYDROCHLORIDE AND ACETAMINOPHEN SCH MG: 500 TABLET ORAL at 08:30

## 2020-12-20 RX ADMIN — METOPROLOL TARTRATE SCH: 50 TABLET, FILM COATED ORAL at 20:57

## 2020-12-20 RX ADMIN — AMIODARONE HYDROCHLORIDE SCH MG: 200 TABLET ORAL at 20:43

## 2020-12-20 RX ADMIN — AMIODARONE HYDROCHLORIDE SCH MG: 200 TABLET ORAL at 08:30

## 2020-12-20 RX ADMIN — DEXTROSE SCH MG: 50 INJECTION, SOLUTION INTRAVENOUS at 08:31

## 2020-12-20 RX ADMIN — INSULIN ASPART SCH UNIT: 100 INJECTION, SOLUTION INTRAVENOUS; SUBCUTANEOUS at 23:27

## 2020-12-20 RX ADMIN — INSULIN ASPART SCH UNIT: 100 INJECTION, SOLUTION INTRAVENOUS; SUBCUTANEOUS at 17:16

## 2020-12-20 RX ADMIN — INSULIN ASPART SCH UNIT: 100 INJECTION, SOLUTION INTRAVENOUS; SUBCUTANEOUS at 05:12

## 2020-12-20 RX ADMIN — INSULIN ASPART SCH UNIT: 100 INJECTION, SOLUTION INTRAVENOUS; SUBCUTANEOUS at 20:43

## 2020-12-20 RX ADMIN — Medication SCH MG: at 08:30

## 2020-12-20 NOTE — P.PN
Subjective


Progress Note Date: 12/20/20


Principal diagnosis: 





Acute hypoxic respiratory failure secondary to covid 19 pneumonia.


On 12/13/2020, the patient got transferred to the intensive care unit overnight.

 I saw him in a medical floor and the patient was having acute hypoxic 

respiratory failure secondary to coronavirus/Covid 19 related pneumonia.  The 

patient was in the 100%.  After arriving to the intensive care unit, the patient

had to be placed on a BiPAP which is currently running at a pressure of 12/6 cm 

of water with an FiO2 of 20%.  His pulse ox currently is 96%.  Blood gases are 

pending from this morning.  He is resting comfortably on the BiPAP and he is 

able to tolerated without any major difficulties.  His cardiac rhythm has slowed

down.  He remains on atrial fibrillation.  He is on a Cardizem drip running at 

20 mg an hour.  He is also on metoprolol at a dose of 75 mg by mouth three a day

and amiodarone 400 mg by mouth twice a day.  Patient is on Xarelto.  D-dimer is 

elevated which is consistent with his coronavirus/Covid 19 related infection.  

His white cell count of 13.5.  His LDH level is at 904.  The proBNP level was 

3940 and his troponin was at 0.142.  I gave him a dose of Lasix yesterday.  His 

fluid balance over the past 24 hours has been -996 mL.  The chest x-ray still 

showing diffuse bilateral pulmonary infiltrates which is essentially unchanged 

compared to yesterday.  There are diffuse peripheral infiltrates bilaterally.





Reevaluated today on 12/14/20, patient is on BiPAP 12/6, FiO2 is 85%, patient 

seems to be comfortable, he is not in distress, asking to be fed, hence I will 

switch the patient to airvo, and continue to titrate accordingly to maintain O2 

saturation above 90% possible.  Patient remains on Cardizem drip for his atrial 

fibrillation, his rate is controlled, his IV fluid is a 0.9 normal saline.  CBC 

is relatively normal.  Inflammatory markers are elevated, LDH is 995 and his C-

reactive protein is 24.2.


*Normal renal profile is normal chest x-ray is quite abnormal showing diffuse 

interstitial infiltrates bilaterally.





Reevaluated today on 12/15/20, patient remains in the ICU, his pulmonary status 

is marginal at best.  Remains on high flow airvo, FiO2 of 85%, and 60 L flow.  

His O2 saturation is marginal between 90-93% at best.  Patient is on insulin 

drip mostly, 2 units per hour, his Cardizem is off, and he is now in sinus 

rhythm.  Patient completed his course of remdesivir, and he is on Decadron 6 mg 

every 12 hours, is also on Xarelto.  Patient is complaining of shortness of 

breath with any activity.  But looks comfortable at rest.  Remains on the Covid 

19 cocktail.  CBC showed leukocytosis with WBC count of 15.9 hemoglobin is 13.4.

 Basic metabolic profile and renal profile are normal





Reevaluated today on 12/16/20, remains in the ICU, patient is now on BiPAP, IPAP

is 12 EPAP 6 FiO2 is 85%, and his O2 saturation is 94%.  IV fluid is at 50 ML 

per hour, patient completed his treatment with remdesivir, remains on Decadron 6

mg IV push every 12 hours, and he remains on Xarelto.  He is also on the Covid 

19 cocktail.  His pulmonary status is marginal at best, patient will be given 

today a trial of high flow nasal cannula or airvo.  And will titrate 

accordingly.  Chest x-ray continues to show diffuse interstitial infiltrates.  

And some consolidation noted in the right lower lobe.  Inflammatory markers 

remain high LDH is 1456 C-reactive protein is 48.  Electrolytes are normal renal

profile is normal d-dimer is 14.01





On 12/17/2020 patient seen in follow-up in the intensive care unit, he still 

remains on Airvo at 60 L and FiO2 of 80%, his pulse ox is 93-97%, seems to be 

breathing comfortably, does not appear to be in any acute distress, his been 

afebrile overnight, hemodynamically has been stable, does have exertional dyspn

ea, the patient has been in bed for the most part.  No chest discomfort, today's

chest x-ray has been reviewed showing patchy diffuse infiltrates bilaterally.  

He remains on Decadron 6 mg twice daily, he is on Xarelto for anticoagulation, 

he is on 0.9 normal saline at a rate is 50 ML per hour, no other drips.  Patient

has completed a course of Remdesivir, remains on steroids, remains on oral antic

oagulation, today he was noted to have difficulty with right-sided weakness and 

facial droop, brain CT was completed showing atrophy with periventricular white 

matter ischemic changes, but no acute intracranial process.  No slurred speech, 

no difficulty swallowing.  Neurology has been consulted for neurologic 

evaluation, and right-sided weakness





On 12/18/2020 patient seen in follow-up on selective care unit, he remains on 

high flow oxygen at 60 L and FiO2 of 80%, his pulse ox is 95%, hemodynamically 

patient is stable, patient is afebrile.  Yesterday patient was noted to have 

right-sided weakness, and very slight right facial droop, neurology was 

consulted, his angiography CT of the brain showed atrophy with the 

periventricular white matter ischemic changes, no acute intracranial process, 

MRI of the brain scheduled for today, patient denies any worsening dyspnea, he 

is breathing comfortably, he is generally weak.  He is tolerating diet with no 

difficulty swallowing, patient had a 2-D echo showing mild concentric left 

ventricular hypertrophy with mild local hypokinesis and overall lethargy 

systolic function mildly impaired with an EF between 45 and 50% along with some 

mitral and tricuspid regurgitation.  Chest x-ray today shows coarse bilateral 

infiltrates without significant change.  LDH is relatively stable over the last 

3 days, at 1485, and his CRP is trending down and is down to 14.7 at today's 

labs.  Electrolytes and renal profile are relatively unremarkable.  Xarelto for 

anticoagulation for history of atrial fibrillation, his rate is currently 

controlled.





Reevaluated today on 12/19/20, patient is now on the regular medical floor, 

remains on high flow oxygen at 60 L/m, and FiO2 is at 75%.  O2 saturation is 

ranging between 95 up to 97%.  Patient is feeling a bit better, breathing 

easier.  He is afebrile with a temp of 98.4, blood pressure is 152/80, 

respiration is 20.  WBC count today is 13.4, hemoglobin is 14.2, a left lites 

are normal renal profile is normal.  Chest x-ray continues to show diffuse 

pleural parenchymal changes, and interstitial infiltrates, difficult to rule out

any component of interstitial edema considering the findings of covid19 pneumoni

tis.  Patient did receive a dose of Lasix 40 mg IV push 1 today.





Reevaluated today on 12/20/20, patient remains on the regular medical floor, 

remains high flow nasal cannula at 8 L/m, and his O2 saturation is 95%.  Patient

seems to be doing better overall, less cough, less shortness of breath, his 

blood pressure is stable, and his breathing is nonlabored.  No chest x-ray was 

done today, but his last chest x-ray from yesterday continues to show bilateral 

interstitial infiltrates consistent with Covid 19 pneumonitis.  CBC is 

relatively normal electrolytes are normal renal profile is normal, normal 

inflammatory markers were noted today, his last LDH 2 days ago was 1485.  

Patient is old done with his treatment including remdesivir, remains on the 

Covid 19 cocktail.








Objective





- Vital Signs


Vital signs: 


                                   Vital Signs











Temp  97.9 F   12/20/20 08:29


 


Pulse  50 L  12/20/20 13:36


 


Resp  22   12/20/20 13:36


 


BP  137/81   12/20/20 11:29


 


Pulse Ox  95   12/20/20 11:29








                                 Intake & Output











 12/19/20 12/20/20 12/20/20





 18:59 06:59 18:59


 


Intake Total 1020  250


 


Output Total 2975 775 1125


 


Balance -1955 -775 -875


 


Weight  74.5 kg 


 


Intake:   


 


  Oral 1020  250


 


Output:   


 


  Urine 2975 775 1125


 


Other:   


 


  Voiding Method Urinal Urinal Urinal


 


  # Voids 1  2


 


  # Bowel Movements 1  














- Exam


GENERAL: The patient is alert and oriented x3, 8 L high flow nasal cannula with 

O2 saturation 95%.


HEENT: Pupils are round and equally reacting to light. EOMI. No scleral icterus.

No conjunctival pallor. Normocephalic, atraumatic. No pharyngeal erythema. No 

thyromegaly. 


CARDIOVASCULAR: S1 and S2 present. No murmurs, rubs, or gallops. 


PULMONARY: Crackles at the bases, no rhonchi and no wheezes.


ABDOMEN: Soft, nontender, nondistended, normoactive bowel sounds. No palpable 

organomegaly. 


MUSCULOSKELETAL: No joint swelling or deformity. 


EXTREMITIES: No clubbing 1+ bipedal edema or cyanosis


NEUROLOGICAL: Alert and oriented 3, no gross focal deficits


SKIN: No rashes. no petechiae.


Psychiatric: Normal mood, affect and normal mental status examination











- Labs


CBC & Chem 7: 


                                 12/19/20 07:19





                                 12/19/20 07:19


Labs: 


                  Abnormal Lab Results - Last 24 Hours (Table)











  12/19/20 12/19/20 12/19/20 Range/Units





  16:45 20:16 23:35 


 


POC Glucose (mg/dL)  315 H  455 H  245 H  (75-99)  mg/dL














  12/20/20 12/20/20 12/20/20 Range/Units





  04:05 08:07 11:45 


 


POC Glucose (mg/dL)  189 H  156 H  261 H  (75-99)  mg/dL








                      Microbiology - Last 24 Hours (Table)











 12/20/20 01:30 Gram Stain - Preliminary





 Sputum Sputum Culture - Preliminary














Assessment and Plan


Assessment: 





Impression:


Acute hypoxic respiratory failure


Acute covid 19 pneumonitis.


New onset Atrial fibrillation with RVR.


New-onset diabetes.


Acute gastroenteritis secondary to Covid infection


Benign essential hypertension.


History of underlying coronary artery disease.


Right upper lobe pulmonary embolism, patient is on Xarelto.


Troponin leak.


History of gout.


History of right upper lobe nodule to be monitored on outpatient basis.


New-onset right sided arm weakness, CT of the brain is nondiagnostic.





Recommendation:


Continue Decadron.


done with remdesivir


Continue to titrate oxygen, if we could get the patient down to 4 L nasal 

cannula, we can possibly consider discharging the patient home on oxygen.


Continue diuretics.  


Continue obeta blockers.


Continue Xarelto.


Continue GI and DVT prophylaxis.


Possible discharge home in the next 24-48 hours.





Time with Patient: Less than 30

## 2020-12-20 NOTE — P.PN
Subjective





70-year-old male,  who is a patient of Dr. Stovall with a past medical history 

of hypertension,MI coming in with a chief complaint of fatigue and generalized 

weakness that have been ongoing for past 1 week.  Patient states that he has 

been having poor appetite loss of smell and loss of taste for the past 1 week.  

He states that he has been sleeping for more than 20 hours a day.  Patient 

denies having any cough or difficulty in breathing.  He denies having any chest 

pain or palpitations.  He denies having any dysuria or hematuria.  Patient den

ies having any history of diabetes but his blood sugars are in 300s to 400s.  In

the emergency room patient had a chest x-ray showing no acute cardiopulmonary 

process and an EKG showing normal sinus rhythm.  He had labs done showing white 

count of 3.9, hemoglobin 14.4, platelets 166.  Sodium 131, potassium 3.9, 

chloride.  BUN 15, creatinine 0.93 C-reactive protein 59, albumin 3.3 

coronavirus PCR positive. 





On 12/6/2020 patient was seen and examined.  He is currently resting comfortably

in bed.  Appears to be no acute distress.  On reviewing the vitals patient 

continues to fever as high as 102.3.  His blood pressure has been stable around 

130s 80s and he saturating at 95% on room air.  Patient's blood sugars have been

running on the higher side.





On 12/07/2020- patient was seen and examined and the general medical floors.  He

is resting comfortably in bed.  Overnight active issues reported by nursing 

staff. Patient states that his difficulty in breathing is improving.  On 

reviewing the vitals patient's T-max is 98.4, heart rate 72, respiratory rate 

127-79 and saturating at 93% on room air.  On reviewing vitals patient's white 

count of 2.8, hemoglobin 14.4, platelets 189.  Sodium 136, potassium 4.6, 

chloride 102.  BUN 23 and creatinine of 1.0.  Blood sugars have been running 

high in 200s to 300s.  C-reactive protein 7.5.








12/08/2020


This is a pleasant 70 years old male who presents with cough with infection and 

found to have new onset diabetes with elevated hemoglobin A1c at 14%.


Patient currently with no respiratory symptoms, no chest pain or dyspnea 

coughing.  Yesterday he has a regular bowel movement but today he had 1 loose 

bowel movement but no significant abdominal pain or nausea vomiting.


Also he is running a fever of 101.  He saturating 90% at room air.


BMP and CBC were reviewed and they were unremarkable except for mild leukopenia 

2.8K.  D-dimer is negative at 0.34.  Coronavirus is detected.  Chest x-ray is 

normal


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  Normal saline at 75 mL/h was 

added.  Also Levemir 10 units daily and metformin 1000 mg twice daily has been 

added because his sugar still not controlled


Patient is able to eat % of his meal





12/9/20


Patient had a rough night because of fever, his breathing is quiet and stable 

and he's only on 2 L oxygen.  Is still have diarrhea about twice per day which 

is similar to the day before.  No abdominal pain or vomiting.  No significant 

coughing.  unstable


Chest x-ray: Worsening infiltrates, Pronecalcitonin is elevated at 0.27, 

patient is started on Rocephin and Zithromax.


He is new onset diabetes and also is on dexamethasone and his sugar more than 

300, so Levemir was decreased from 10-30 units daily.  Also he is on metformin 

1000 and Amaryl 1 mg.


Continue on Xarelto, normocephalic 75, remedisivr, dexamethasone 6 mg daily.





12/10/2020


Patient hadn't good night  because he could not sleep.  No respiratory symptoms,

ongoing generalized weakness.


Yesterday he has watery diarrhea but this morning he had small more formed bowel

movement.  No abdominal pain.


Persistent fever have subsided and his been afebrile for more than 24 hours.  We

will check Tylenol as needed


His sugar is better controlled after increasing his insulin to 30 units.


Antibiotics with ceftriaxone and Zithromax were admitted with high 

proteincalcitonin.  We will try to check sputum culture.


Check labs in the morning





12/11/2020


Patient feels much better today, his fever subsided and his on Tylenol as needed

currently.  No respiratory issue or symptom.  He had a little formed bowel 

movement today.  Currently patient remains on the same cocktail for comfort 

infection besides remedisivr


Hemodynamically stable.  Vitals are stable showing mild leukocytosis of 11.6 K





12/12/2020


pt is moved to [facility] unit , pt developed a fib and RVR and he was started on 

Cardizem and amiodarone drip and is already on Xarelto. 


Also patient on Lopressor 75 mg 3 times a day Also he developed more hypoxic and

is currently on 50 L oxygen via nasal cannula saturating 90%.  


Repeat chest x-ray Moderate peripheral-based opacities throughout both lungs 

with no pleural effusion.  Blood gas on BOTH HIS WITH PH 7.48, LOW PCO2 OF 22 

AND LOW PO2 AT 58 PATIENT HAS BEEN EVALUATED BY PULMONARY SERVICE AND PATIENT 

MAY NEED TO GO TO THE ICU AS WELL.


LEFT SHOWING STABLE LEUKOCYTOSIS AT 14.9 K, ELEVATED D-DIMER 3.1, BMP IS 

UNREMARKABLE AND WORSENING OF THE EDGE AND C-REACTIVE PROTEIN


Patient is kept on remedisivr, dexamethasone 6 mg daily.


However patient diarrhea has improved 








12/13/2020


Patient today kept in the ICU on BiPAP saturating 100% however he looks 

comfortable.  Distal short of breath and dyspneic


Rest of vitals are stable


Laps looks stable, inflammatory markers LDH and C-reactive protein still 

trending up slowly compared to yesterday.proCalcitonin is negative.  D-dimer is

trending up at 5.7


Chest x-ray showing stable bilateral lung infiltrates


He remains on Decadron and remdesivir


Also I kept on Cardizem drip at 20 mg an hour and amiodarone 400 mg twice daily 

and metoprolol 75 mg 3 times a day, vitamin C and A. fib and heart rate ranging 

between .


He has an echo from 07/22/2020 showed ejection fraction of 50-55%.  BNP is 

elevated at 3940.


Discontinue ceftriaxone.  Keep Zithromax for now.  Patient to continue on 

Xarelto 


patient is nothing by mouth WE'LL KEEP HIM ON GENTLE HYDRATION AT 50 ML/H.  ALSO

GOT 1 DOSE OF LASIX





12/14/2020


Patient is seen and evaluated and follow-up continues to be closely monitored in

the ICU.  Patient has been taken off the BiPAP and placed on Airvo with an oxy

gen rate of 60 and FiO2 at 85% and is currently 91%.  To continue to wean as 

tolerated with the use of BiPAP as needed as well.  Patient is afebrile.  

Patient's d-dimer continues to be elevated and has increased at 11.67 and 

patient is maintained on Xarelto and will continue at this time.  Patient is 

eating today and tolerating with no reports of nausea or vomiting noted.  

Multiple medical consultations following including cardiology.  Cardizem drip 

has been decreased to 10 and will continue to monitor this patient continues on 

amiodarone as well.  Heart rate currently in the 70s.  Blood sugars being 

controlled and will continue sliding scale along with long-acting at this time. 

Patient to continue with dexamethasone along with vitamin C and D and zinc 

supplements.  Patient is currently off antibiotics and will continue to monitor 

closely.  Chest x-ray today shows cardiomegaly with bilateral multifocal acute 

infiltrates greatest in the periphery, organizing consolidation in the lower 

lungs with no significant change. 





12/15/2020


Patient seen in follow-up continues to be closely monitored in the ICU.  Patient

is maintained on BiPAP at night and transitioning to Airvo during the day.  

Patient was placed on insulin drip and will continue to monitor Accu-Cheks 

closely.  Patient continues to be severely dyspneic with any exertion and is 89-

90% with an O2 flow of 60 and FiO2 of 85.  IV Cardizem drip has been 

discontinued and patient is maintained on amiodarone oral and will continue at 

this time.  Anticoagulation of Xarelto continues.  Chest x-rays continue to show

worsening and consistent with Covid 19 infection progression. 





12/16/2020


Patient continues to be in the ICU and maintained on BiPAP with airvo during the

day and not really showing any improvements as far as oxygenation.  Patient  

states his breathing has not worsened but is also not improved.  Patient is hun

gry and awaiting to be switched to airvo so he can eat.  Blood sugars continue 

to be elevated and insulin drip was discontinued.  Will increase Levemir to 20 

units daily and continue with sliding scale.  Patient remains off Cardizem drip 

and is currently on oral amiodarone and will continue at this time.  D-dimer 

also continues to be elevated and is currently 14.01.  Patient is maintained on 

Xarelto.  Current sodium is 135, potassium is 4.1, creatinine is 0.85, WBC is 

13.4.  Discussed with the patient about CODE STATUS and patient wishes to remain

a full code.  Chest x-ray continues to show no improvement from previous x-ray.





12/17/2020


Patient currently remains in the ICU although awaiting to be transferred to 

[facility].  Patient continues on the Airvo with BiPAP at night.  Patient states 

he was having some right-sided weakness and facial droop since yesterday and CT 

angio done showing atrophy with very ventricular white matter ischemic changes 

with no acute intracranial process, normal [place] of [place], and no flow-

limiting stenosis of the bilateral carotid bifurcations with minimal 

atheromatous calcifications present.  Neurology was consulted for evaluation and

is currently pending.  Patient's chest x-ray today continues to show patchy 

diffuse infiltrates present bilaterally.  Patient is currently tolerating diet 

and remains on sliding scale along with long-acting and will continue at this 

time.





12/18/2020


Patient is seen in follow-up currently on [facility] is a recent transfer to the 

ICU.  Patient is maintained on Airvo and continued on this throughout the night.

 BiPAP was not used.  Patient states his breathing feels somewhat better today. 

Neurology evaluating the patient recommending an MRI which is currently pending 

at this time as patient had some right-sided weakness along with slurred speech.

 Right side upper extremity strength is 4/5 with no deficits noted on the right 

lower extremity.  No slurring of speech noted as well.  Patient is tolerating 

diet with no difficulty in swallowing.  Patient also underwent 2-D echo showing 

mild concentric left ventricular hypertrophy with mild local hypokinesis with 

overall left ventricular systolic function is mildly impaired with an EF between

45 and 50% along with some mitral and tricuspid regurgitation present.  Sugars 

being closely monitored and adjustments have been made to long-acting and will 

continue sliding scale as well.  Patient remains on IV dexamethasone along with 

Xarelto at this time. 





12/19/2020


Patient is awaiting for MRI chest x-ray is still showing significant the 

congestion possibility of pulmonary edema cannot be ruled out.  Patient is off 

Lasix nephrology and pulmonology is following the patient.  Asian may need a 

dose of Lasix early the decision to pulmonary patient remains on 6 L of oxygen. 

Saturating 97%.





12/20/2020


Patient still didn't get an MRI.  Although patient is already on 

anticoagulation.  Patient also requirements have gone down to around 45% patient

is bit hyponatremic probably because of the one dose of Lasix that was given 

yesterday.  Continue to improve but pretty slow








CONSTITUTIONAL: No fever, no malaise, no fatigue. 


HEENT: No recent visual problems or hearing problems. Denied any sore throat. 


CARDIOVASCULAR: No  orthopnea, PND, no palpitations, no syncope. 


PULMONARY: reports continued shortness of breath, no cough, no hemoptysis. 


GASTROINTESTINAL: No diarrhea, no nausea, no vomiting, no abdominal pain. 

Normoactive bowel sounds. 


NEUROLOGICAL: No headaches, reports weakness,  right upper extremity weakness 

with improvement, no numbness. 





Objective





- Vital Signs


Vital signs: 


                                   Vital Signs











Temp  97.9 F   12/19/20 23:42


 


Pulse  49 L  12/20/20 04:00


 


Resp  21   12/20/20 04:00


 


BP  145/84   12/20/20 04:00


 


Pulse Ox  95   12/20/20 04:52








                                 Intake & Output











 12/19/20 12/20/20 12/20/20





 18:59 06:59 18:59


 


Intake Total 1020  


 


Output Total 2975 775 


 


Balance -1955 -775 


 


Weight  74.5 kg 


 


Intake:   


 


  Oral 1020  


 


Output:   


 


  Urine 2975 775 


 


Other:   


 


  Voiding Method Urinal Urinal 


 


  # Voids 1  


 


  # Bowel Movements 1  














- Exam








PHYSICAL EXAMINATION: 





GENERAL: The patient is alert and oriented x3, not in any acute distress. Well 

developed, well nourished. 


HEENT: Pupils are round and equally reacting to light. EOMI. No scleral icterus.

No conjunctival pallor. Normocephalic, atraumatic. No pharyngeal erythema. No 

thyromegaly. 


CARDIOVASCULAR: S1 and S2 present. No murmurs, rubs, or gallops. 


PULMONARY: Chest is clear to auscultation, no wheezing or crackles. 


ABDOMEN: Soft, nontender, nondistended, normoactive bowel sounds. No palpable 

organomegaly. 


MUSCULOSKELETAL: No joint swelling or deformity.


EXTREMITIES: No cyanosis, clubbing, or pedal edema. 


NEUROLOGICAL: Gross neurological examination did not reveal any focal deficits. 


SKIN: No rashes. 





Note: Because of COVID 19 isolation, some of the history and physical exam 

findings are indirect and obtained from nursing staff, and other physician 

examinations to avoid unnecessary contact with the patient.











- Labs


CBC & Chem 7: 


                                 12/19/20 07:19





                                 12/19/20 07:19


Labs: 


                  Abnormal Lab Results - Last 24 Hours (Table)











  12/19/20 12/19/20 12/19/20 Range/Units





  07:19 07:19 08:16 


 


WBC  13.4 H    (3.8-10.6)  k/uL


 


Plt Count  136 L    (150-450)  k/uL


 


Neutrophils #  12.6 H    (1.3-7.7)  k/uL


 


Lymphocytes #  0.4 L    (1.0-4.8)  k/uL


 


Sodium   133 L   (137-145)  mmol/L


 


BUN   27 H   (9-20)  mg/dL


 


Glucose   181 H   (74-99)  mg/dL


 


POC Glucose (mg/dL)    278 H  (75-99)  mg/dL


 


ALT   63 H   (4-49)  U/L


 


Total Protein   5.6 L   (6.3-8.2)  g/dL


 


Albumin   2.7 L   (3.5-5.0)  g/dL














  12/19/20 12/19/20 12/19/20 Range/Units





  11:45 16:45 20:16 


 


WBC     (3.8-10.6)  k/uL


 


Plt Count     (150-450)  k/uL


 


Neutrophils #     (1.3-7.7)  k/uL


 


Lymphocytes #     (1.0-4.8)  k/uL


 


Sodium     (137-145)  mmol/L


 


BUN     (9-20)  mg/dL


 


Glucose     (74-99)  mg/dL


 


POC Glucose (mg/dL)  288 H  315 H  455 H  (75-99)  mg/dL


 


ALT     (4-49)  U/L


 


Total Protein     (6.3-8.2)  g/dL


 


Albumin     (3.5-5.0)  g/dL














  12/19/20 12/20/20 Range/Units





  23:35 04:05 


 


WBC    (3.8-10.6)  k/uL


 


Plt Count    (150-450)  k/uL


 


Neutrophils #    (1.3-7.7)  k/uL


 


Lymphocytes #    (1.0-4.8)  k/uL


 


Sodium    (137-145)  mmol/L


 


BUN    (9-20)  mg/dL


 


Glucose    (74-99)  mg/dL


 


POC Glucose (mg/dL)  245 H  189 H  (75-99)  mg/dL


 


ALT    (4-49)  U/L


 


Total Protein    (6.3-8.2)  g/dL


 


Albumin    (3.5-5.0)  g/dL














Assessment and Plan


Plan: 





 Covid 19 pneumonia


acute hypoxic respiratory failure


A. fib with RVR, presently rate controlled


Possible lacunar stroke due to small vessel disease secondary to uncontrolled 

diabetes, neurology following an MRI pending


Fatigue generalized weakness secondary to Covid infection


New-onset diabetes


-That his heart failure chronic systolic dysfunction with acute exacerbation 

patient has EF of around the 45-50% patient appears to be hypovolemic will give 

him Lasix


Hyponatremia .  Hypervolemic hyponatremia  Congestive heart failure improved


Gastroenteritis secondary to covid infection


Hypomagnesemia, improved


Hypertension


History of MI


DVT prophylaxis: Xarelto


GI prophylaxis: Pepcid


Full code





Plan: 


Continue current medications and management.  Multiple medical consultations 

following.  Neurology following and has ordered an MRI of the brain for possible

stroke as patient has been experiencing some slurred speech and right side upper

extremity weakness.  No slurred speech noted on exam and right side upper 

extremity weakness has improved with a strength of 4/5 with no deficits noted of

the right lower extremity.  Adjustments to long-acting insulin have been made 

and will continue sliding scale and Accu-Cheks before meals at bedtime.  Patient

is maintained on airvo and will continue at this time.  Continue to wean FiO2 as

tolerated.  PT/OT to evaluate the patient as patient continues to be extremely 

weak.  Further recommendations to follow as per clinical course of the patient. 

Prognosis is poor and extremely guarded.

## 2020-12-21 LAB
GLUCOSE BLD-MCNC: 267 MG/DL (ref 75–99)
GLUCOSE BLD-MCNC: 277 MG/DL (ref 75–99)
GLUCOSE BLD-MCNC: 299 MG/DL (ref 75–99)
GLUCOSE BLD-MCNC: 310 MG/DL (ref 75–99)
GLUCOSE BLD-MCNC: 318 MG/DL (ref 75–99)
GLUCOSE BLD-MCNC: 364 MG/DL (ref 75–99)

## 2020-12-21 RX ADMIN — DEXTROSE SCH MG: 50 INJECTION, SOLUTION INTRAVENOUS at 08:27

## 2020-12-21 RX ADMIN — INSULIN ASPART SCH UNIT: 100 INJECTION, SOLUTION INTRAVENOUS; SUBCUTANEOUS at 20:47

## 2020-12-21 RX ADMIN — INSULIN DETEMIR SCH UNIT: 100 INJECTION, SOLUTION SUBCUTANEOUS at 06:29

## 2020-12-21 RX ADMIN — AMIODARONE HYDROCHLORIDE SCH MG: 200 TABLET ORAL at 20:47

## 2020-12-21 RX ADMIN — INSULIN ASPART SCH UNIT: 100 INJECTION, SOLUTION INTRAVENOUS; SUBCUTANEOUS at 12:37

## 2020-12-21 RX ADMIN — RIVAROXABAN SCH MG: 20 TABLET, FILM COATED ORAL at 16:38

## 2020-12-21 RX ADMIN — INSULIN ASPART SCH UNIT: 100 INJECTION, SOLUTION INTRAVENOUS; SUBCUTANEOUS at 16:38

## 2020-12-21 RX ADMIN — OXYCODONE HYDROCHLORIDE AND ACETAMINOPHEN SCH MG: 500 TABLET ORAL at 08:27

## 2020-12-21 RX ADMIN — METOPROLOL TARTRATE SCH: 50 TABLET, FILM COATED ORAL at 20:40

## 2020-12-21 RX ADMIN — DEXTROSE SCH MG: 50 INJECTION, SOLUTION INTRAVENOUS at 20:47

## 2020-12-21 RX ADMIN — Medication SCH MG: at 08:27

## 2020-12-21 RX ADMIN — METOPROLOL TARTRATE SCH: 50 TABLET, FILM COATED ORAL at 08:33

## 2020-12-21 RX ADMIN — AMIODARONE HYDROCHLORIDE SCH MG: 200 TABLET ORAL at 08:27

## 2020-12-21 RX ADMIN — INSULIN ASPART SCH UNIT: 100 INJECTION, SOLUTION INTRAVENOUS; SUBCUTANEOUS at 03:18

## 2020-12-21 RX ADMIN — PANTOPRAZOLE SODIUM SCH MG: 40 TABLET, DELAYED RELEASE ORAL at 08:27

## 2020-12-21 RX ADMIN — INSULIN ASPART SCH UNIT: 100 INJECTION, SOLUTION INTRAVENOUS; SUBCUTANEOUS at 08:27

## 2020-12-21 NOTE — P.PN
Subjective





70-year-old male,  who is a patient of Dr. Stovall with a past medical history 

of hypertension,MI coming in with a chief complaint of fatigue and generalized 

weakness that have been ongoing for past 1 week.  Patient states that he has 

been having poor appetite loss of smell and loss of taste for the past 1 week.  

He states that he has been sleeping for more than 20 hours a day.  Patient 

denies having any cough or difficulty in breathing.  He denies having any chest 

pain or palpitations.  He denies having any dysuria or hematuria.  Patient den

ies having any history of diabetes but his blood sugars are in 300s to 400s.  In

the emergency room patient had a chest x-ray showing no acute cardiopulmonary 

process and an EKG showing normal sinus rhythm.  He had labs done showing white 

count of 3.9, hemoglobin 14.4, platelets 166.  Sodium 131, potassium 3.9, 

chloride.  BUN 15, creatinine 0.93 C-reactive protein 59, albumin 3.3 

coronavirus PCR positive. 





On 12/6/2020 patient was seen and examined.  He is currently resting comfortably

in bed.  Appears to be no acute distress.  On reviewing the vitals patient 

continues to fever as high as 102.3.  His blood pressure has been stable around 

130s 80s and he saturating at 95% on room air.  Patient's blood sugars have been

running on the higher side.





On 12/07/2020- patient was seen and examined and the general medical floors.  He

is resting comfortably in bed.  Overnight active issues reported by nursing 

staff. Patient states that his difficulty in breathing is improving.  On 

reviewing the vitals patient's T-max is 98.4, heart rate 72, respiratory rate 

127-79 and saturating at 93% on room air.  On reviewing vitals patient's white 

count of 2.8, hemoglobin 14.4, platelets 189.  Sodium 136, potassium 4.6, 

chloride 102.  BUN 23 and creatinine of 1.0.  Blood sugars have been running 

high in 200s to 300s.  C-reactive protein 7.5.








12/08/2020


This is a pleasant 70 years old male who presents with cough with infection and 

found to have new onset diabetes with elevated hemoglobin A1c at 14%.


Patient currently with no respiratory symptoms, no chest pain or dyspnea 

coughing.  Yesterday he has a regular bowel movement but today he had 1 loose 

bowel movement but no significant abdominal pain or nausea vomiting.


Also he is running a fever of 101.  He saturating 90% at room air.


BMP and CBC were reviewed and they were unremarkable except for mild leukopenia 

2.8K.  D-dimer is negative at 0.34.  Coronavirus is detected.  Chest x-ray is 

normal


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  Normal saline at 75 mL/h was 

added.  Also Levemir 10 units daily and metformin 1000 mg twice daily has been 

added because his sugar still not controlled


Patient is able to eat % of his meal





12/9/20


Patient had a rough night because of fever, his breathing is quiet and stable 

and he's only on 2 L oxygen.  Is still have diarrhea about twice per day which 

is similar to the day before.  No abdominal pain or vomiting.  No significant 

coughing.  unstable


Chest x-ray: Worsening infiltrates, Pronecalcitonin is elevated at 0.27, 

patient is started on Rocephin and Zithromax.


He is new onset diabetes and also is on dexamethasone and his sugar more than 

300, so Levemir was decreased from 10-30 units daily.  Also he is on metformin 

1000 and Amaryl 1 mg.


Continue on Xarelto, normocephalic 75, remedisivr, dexamethasone 6 mg daily.





12/10/2020


Patient hadn't good night  because he could not sleep.  No respiratory symptoms,

ongoing generalized weakness.


Yesterday he has watery diarrhea but this morning he had small more formed bowel

movement.  No abdominal pain.


Persistent fever have subsided and his been afebrile for more than 24 hours.  We

will check Tylenol as needed


His sugar is better controlled after increasing his insulin to 30 units.


Antibiotics with ceftriaxone and Zithromax were admitted with high 

proteincalcitonin.  We will try to check sputum culture.


Check labs in the morning





12/11/2020


Patient feels much better today, his fever subsided and his on Tylenol as needed

currently.  No respiratory issue or symptom.  He had a little formed bowel 

movement today.  Currently patient remains on the same cocktail for comfort 

infection besides remedisivr


Hemodynamically stable.  Vitals are stable showing mild leukocytosis of 11.6 K





12/12/2020


pt is moved to [facility] unit , pt developed a fib and RVR and he was started on 

Cardizem and amiodarone drip and is already on Xarelto. 


Also patient on Lopressor 75 mg 3 times a day Also he developed more hypoxic and

is currently on 50 L oxygen via nasal cannula saturating 90%.  


Repeat chest x-ray Moderate peripheral-based opacities throughout both lungs 

with no pleural effusion.  Blood gas on BOTH HIS WITH PH 7.48, LOW PCO2 OF 22 

AND LOW PO2 AT 58 PATIENT HAS BEEN EVALUATED BY PULMONARY SERVICE AND PATIENT 

MAY NEED TO GO TO THE ICU AS WELL.


LEFT SHOWING STABLE LEUKOCYTOSIS AT 14.9 K, ELEVATED D-DIMER 3.1, BMP IS 

UNREMARKABLE AND WORSENING OF THE EDGE AND C-REACTIVE PROTEIN


Patient is kept on remedisivr, dexamethasone 6 mg daily.


However patient diarrhea has improved 








12/13/2020


Patient today kept in the ICU on BiPAP saturating 100% however he looks 

comfortable.  Distal short of breath and dyspneic


Rest of vitals are stable


Laps looks stable, inflammatory markers LDH and C-reactive protein still 

trending up slowly compared to yesterday.proCalcitonin is negative.  D-dimer is

trending up at 5.7


Chest x-ray showing stable bilateral lung infiltrates


He remains on Decadron and remdesivir


Also I kept on Cardizem drip at 20 mg an hour and amiodarone 400 mg twice daily 

and metoprolol 75 mg 3 times a day, vitamin C and A. fib and heart rate ranging 

between .


He has an echo from 07/22/2020 showed ejection fraction of 50-55%.  BNP is 

elevated at 3940.


Discontinue ceftriaxone.  Keep Zithromax for now.  Patient to continue on 

Xarelto 


patient is nothing by mouth WE'LL KEEP HIM ON GENTLE HYDRATION AT 50 ML/H.  ALSO

GOT 1 DOSE OF LASIX





12/14/2020


Patient is seen and evaluated and follow-up continues to be closely monitored in

the ICU.  Patient has been taken off the BiPAP and placed on Airvo with an oxy

gen rate of 60 and FiO2 at 85% and is currently 91%.  To continue to wean as 

tolerated with the use of BiPAP as needed as well.  Patient is afebrile.  

Patient's d-dimer continues to be elevated and has increased at 11.67 and 

patient is maintained on Xarelto and will continue at this time.  Patient is 

eating today and tolerating with no reports of nausea or vomiting noted.  

Multiple medical consultations following including cardiology.  Cardizem drip 

has been decreased to 10 and will continue to monitor this patient continues on 

amiodarone as well.  Heart rate currently in the 70s.  Blood sugars being 

controlled and will continue sliding scale along with long-acting at this time. 

Patient to continue with dexamethasone along with vitamin C and D and zinc 

supplements.  Patient is currently off antibiotics and will continue to monitor 

closely.  Chest x-ray today shows cardiomegaly with bilateral multifocal acute 

infiltrates greatest in the periphery, organizing consolidation in the lower 

lungs with no significant change. 





12/15/2020


Patient seen in follow-up continues to be closely monitored in the ICU.  Patient

is maintained on BiPAP at night and transitioning to Airvo during the day.  

Patient was placed on insulin drip and will continue to monitor Accu-Cheks 

closely.  Patient continues to be severely dyspneic with any exertion and is 89-

90% with an O2 flow of 60 and FiO2 of 85.  IV Cardizem drip has been 

discontinued and patient is maintained on amiodarone oral and will continue at 

this time.  Anticoagulation of Xarelto continues.  Chest x-rays continue to show

worsening and consistent with Covid 19 infection progression. 





12/16/2020


Patient continues to be in the ICU and maintained on BiPAP with airvo during the

day and not really showing any improvements as far as oxygenation.  Patient  

states his breathing has not worsened but is also not improved.  Patient is hun

gry and awaiting to be switched to airvo so he can eat.  Blood sugars continue 

to be elevated and insulin drip was discontinued.  Will increase Levemir to 20 

units daily and continue with sliding scale.  Patient remains off Cardizem drip 

and is currently on oral amiodarone and will continue at this time.  D-dimer 

also continues to be elevated and is currently 14.01.  Patient is maintained on 

Xarelto.  Current sodium is 135, potassium is 4.1, creatinine is 0.85, WBC is 

13.4.  Discussed with the patient about CODE STATUS and patient wishes to remain

a full code.  Chest x-ray continues to show no improvement from previous x-ray.





12/17/2020


Patient currently remains in the ICU although awaiting to be transferred to 

[facility].  Patient continues on the Airvo with BiPAP at night.  Patient states 

he was having some right-sided weakness and facial droop since yesterday and CT 

angio done showing atrophy with very ventricular white matter ischemic changes 

with no acute intracranial process, normal [place], and no flow-

limiting stenosis of the bilateral carotid bifurcations with minimal 

atheromatous calcifications present.  Neurology was consulted for evaluation and

is currently pending.  Patient's chest x-ray today continues to show patchy 

diffuse infiltrates present bilaterally.  Patient is currently tolerating diet 

and remains on sliding scale along with long-acting and will continue at this 

time.





12/18/2020


Patient is seen in follow-up currently on [facility] is a recent transfer to the 

ICU.  Patient is maintained on Airvo and continued on this throughout the night.

 BiPAP was not used.  Patient states his breathing feels somewhat better today. 

Neurology evaluating the patient recommending an MRI which is currently pending 

at this time as patient had some right-sided weakness along with slurred speech.

 Right side upper extremity strength is 4/5 with no deficits noted on the right 

lower extremity.  No slurring of speech noted as well.  Patient is tolerating 

diet with no difficulty in swallowing.  Patient also underwent 2-D echo showing 

mild concentric left ventricular hypertrophy with mild local hypokinesis with 

overall left ventricular systolic function is mildly impaired with an EF between

45 and 50% along with some mitral and tricuspid regurgitation present.  Sugars 

being closely monitored and adjustments have been made to long-acting and will 

continue sliding scale as well.  Patient remains on IV dexamethasone along with 

Xarelto at this time. 





12/19/2020


Patient is awaiting for MRI chest x-ray is still showing significant the 

congestion possibility of pulmonary edema cannot be ruled out.  Patient is off 

Lasix nephrology and pulmonology is following the patient.  Asian may need a 

dose of Lasix early the decision to pulmonary patient remains on 6 L of oxygen. 

Saturating 97%.





12/20/2020


Patient still didn't get an MRI.  Although patient is already on 

anticoagulation.  Patient also requirements have gone down to around 45% patient

is bit hyponatremic probably because of the one dose of Lasix that was given 

yesterday.  Continue to improve but pretty slow








12/21/2020


Patient is on 5 lit of oxygen.  Physical therapy evaluated in the past but not a

bony any subacute rehabilitation as and reevaluate the patient today.  Once 

we're able to wean him off to around 2-3 L patient will be discharged at that 

time patient is otherwise clinically doing well is significantly doing better.











CONSTITUTIONAL: No fever, no malaise, no fatigue. 


HEENT: No recent visual problems or hearing problems. Denied any sore throat. 


CARDIOVASCULAR: No  orthopnea, PND, no palpitations, no syncope. 


PULMONARY: reports continued shortness of breath, no cough, no hemoptysis. 


GASTROINTESTINAL: No diarrhea, no nausea, no vomiting, no abdominal pain. 

Normoactive bowel sounds. 


NEUROLOGICAL: No headaches, reports weakness,  right upper extremity weakness 

with improvement, no numbness. 





Objective





- Vital Signs


Vital signs: 


                                   Vital Signs











Temp  97.5 F L  12/21/20 03:38


 


Pulse  51 L  12/21/20 03:38


 


Resp  18   12/21/20 03:38


 


BP  135/74   12/21/20 03:38


 


Pulse Ox  95   12/21/20 03:38








                                 Intake & Output











 12/20/20 12/21/20 12/21/20





 18:59 06:59 18:59


 


Intake Total 670 480 


 


Output Total 1300 2250 


 


Balance -630 -1770 


 


Weight  74 kg 


 


Intake:   


 


  Oral 670 480 


 


Output:   


 


  Urine 1300 2250 


 


Other:   


 


  Voiding Method Urinal  


 


  # Voids 2 1 














- Exam








PHYSICAL EXAMINATION: 





GENERAL: The patient is alert and oriented x3, not in any acute distress. Well 

developed, well nourished. 


HEENT: Pupils are round and equally reacting to light. EOMI. No scleral icterus.

No conjunctival pallor. Normocephalic, atraumatic. No pharyngeal erythema. No 

thyromegaly. 


CARDIOVASCULAR: S1 and S2 present. No murmurs, rubs, or gallops. 


PULMONARY: Chest is clear to auscultation, no wheezing or crackles. 


ABDOMEN: Soft, nontender, nondistended, normoactive bowel sounds. No palpable 

organomegaly. 


MUSCULOSKELETAL: No joint swelling or deformity.


EXTREMITIES: No cyanosis, clubbing, or pedal edema. 


NEUROLOGICAL: Gross neurological examination did not reveal any focal deficits. 


SKIN: No rashes. 





Note: Because of COVID 19 isolation, some of the history and physical exam 

findings are indirect and obtained from nursing staff, and other physician 

examinations to avoid unnecessary contact with the patient.











- Labs


CBC & Chem 7: 


                                 12/19/20 07:19





                                 12/19/20 07:19


Labs: 


                  Abnormal Lab Results - Last 24 Hours (Table)











  12/20/20 12/20/20 12/20/20 Range/Units





  11:45 16:24 19:58 


 


POC Glucose (mg/dL)  261 H  333 H  323 H  (75-99)  mg/dL














  12/20/20 12/21/20 12/21/20 Range/Units





  23:06 03:07 08:02 


 


POC Glucose (mg/dL)  262 H  277 H  310 H  (75-99)  mg/dL








                      Microbiology - Last 24 Hours (Table)











 12/20/20 01:30 Gram Stain - Preliminary





 Sputum Sputum Culture - Preliminary














Assessment and Plan


Plan: 





 Covid 19 pneumonia


acute hypoxic respiratory failure


A. fib with RVR, presently rate controlled


Possible lacunar stroke due to small vessel disease secondary to uncontrolled 

diabetes, neurology following an MRI pending


Fatigue generalized weakness secondary to Covid infection


New-onset diabetes


-That his heart failure chronic systolic dysfunction with acute exacerbation 

patient has EF of around the 45-50% patient appears to be hypovolemic will give 

him Lasix


Hyponatremia .  Hypervolemic hyponatremia  Congestive heart failure improved


Gastroenteritis secondary to covid infection


Hypomagnesemia, improved


Hypertension


History of MI


DVT prophylaxis: Xarelto


GI prophylaxis: Pepcid


Full code





Plan: 


Continue current medications and management.  Multiple medical consultations 

following.  Neurology following and has ordered an MRI of the brain for possible

stroke as patient has been experiencing some slurred speech and right side upper

extremity weakness, because of his CODE STATUS patient is unable to complete the

or get an MRI.  Neurology will be evaluated the patient will reassess the 

necessity of for doing an MRI again.  No slurred speech noted on exam and right 

side upper extremity weakness has improved with a strength of 4/5 with no 

deficits noted of the right lower extremity.  Adjustments to long-acting insulin

have been made and will continue sliding scale and Accu-Cheks before meals at 

bedtime.  Patient is liters of oxygen today we'll try to wean it off to around 

2-3 L before his discharge most probably tomorrow.  Continue to wean FiO2 as 

tolerated.  PT/OT to evaluate the patient as patient continues to be extremely 

weak.   Prognosis is poor and extremely guarded.

## 2020-12-21 NOTE — PN
PROGRESS NOTE



DATE OF SERVICE:

12/20/2020



REASON FOR FOLLOWUP:

COVID-19 infection.



INTERVAL HISTORY:

The patient is currently afebrile. He is breathing more comfortably.  Patient 
denies

having any chest pain.  Occasional cough.  No abdominal pain or diarrhea.



PHYSICAL EXAMINATION:

Blood pressure 147/81 with a pulse of 50, temperature 97.7. He is 96% on 8 L 
high-flow

oxygen.

General description is an elderly male up in the chair in no distress.

RESPIRATORY SYSTEM: Unlabored breathing with decreased intensity of breath 
sounds. No

wheeze.

HEART: S1, S2.  Regular rate and rhythm.

ABDOMEN:  Soft, no tenderness.



LABS:

No new labs have been obtained today.



DIAGNOSTIC IMPRESSION AND PLAN:

Patient with acute COVID-19 pneumonia in this patient with slow clinical 
improvement,

has completed his remdesivir therapy; currently on anticoagulation, and monitor

his clinical course closely.





WILFRED / DUY: [id number] / Job#: [id number]

MTDD

## 2020-12-21 NOTE — MR
EXAMINATION TYPE: MR brain wo con

 

DATE OF EXAM: 12/21/2020 12:33 PM

 

COMPARISON: NONE

 

HISTORY: CVA

 

FINDINGS:

 

The ventricles, basal cisterns and sulci overlying the cerebral convexities are moderately enlarged. 
 

 

There is evidence of mild periventricular white matter ischemic demyelination.  

 

Remote deep white matter insults are also noted.  

 

Diffusion-weighted imaging demonstrates scattered foci of hyperintensity within the posterior fossa a
s well as both cerebral hemispheres involving the right parietal occipital region, left and right fro
ntal regions as well as the centrum semioval bilaterally bilaterally left greater than right. Finding
s suggest embolic process.

 

There is no evidence for midline shift or mass effect.  

 

Acute intracranial hemorrhage or extra-axial collection is not evident.    

 

 

 

Complete opacification left sided mastoid air cells. Mucous retention cyst or polyp right maxillary s
inus.

 

IMPRESSION:    

 

Diffusion-weighted imaging demonstrates scattered foci of hyperintensity within the posterior fossa a
s well as both cerebral hemispheres involving the right parietal occipital region, left and right fro
ntal regions as well as the centrum semioval bilaterally bilaterally left greater than right. Finding
s suggest embolic process.

## 2020-12-21 NOTE — P.PN
Subjective


Progress Note Date: 12/21/20


The patient was seen at bedside and he stated that he is doing somewhat better 

today compared to couple days ago.


He denies of any new weakness, numbness, visual disturbance.  He denies of any 

worsening of his weakness.








Objective





- Vital Signs


Vital signs: 


                                   Vital Signs











Temp  97.6 F   12/21/20 11:26


 


Pulse  54 L  12/21/20 11:26


 


Resp  20   12/21/20 11:26


 


BP  144/58   12/21/20 11:26


 


Pulse Ox  97   12/21/20 11:26








                                 Intake & Output











 12/20/20 12/21/20 12/21/20





 18:59 06:59 18:59


 


Intake Total 670 480 240


 


Output Total 1300 2250 600


 


Balance -630 -2676 -360


 


Weight  74 kg 


 


Intake:   


 


  Oral 670 480 240


 


Output:   


 


  Urine 1300 2250 600


 


Other:   


 


  Voiding Method Urinal  


 


  # Voids 2 1 














- Exam


On examination patient is an elderly  male, laying in the bed, in mild 

to moderate respiratory distress.  





Neurological exam:


Higher mental function: The patient is awake alert oriented to self, place and 

time.  Following simple commands.  No aphasia or neglect.


Cranial Nerves: The pupils are round, equal and reactive to light bilaterally.  

Ocular movement is intact and no nystagmus noted bilaterally.


Mild right facial asymmetry.  Mild dysarthria.  Tongue is midline and moved 

side-to-side without any difficulty.


Motor: Gait deferred.  On muscle strength testing patient's strength is normal 

on the left side.  On the right side, deltoid 5-, biceps 5-, triceps 5,  

4+5-, hip flexion 5-.  Tone and bulk of muscles normal.  


Cerebellar: Has mild ataxia for finger-to-nose on the right.  Heel to shin in 

normal bilaterally.


Sensory touch is equal.   





- Labs


CBC & Chem 7: 


                                 12/19/20 07:19





                                 12/19/20 07:19


Labs: 


                  Abnormal Lab Results - Last 24 Hours (Table)











  12/20/20 12/20/20 12/20/20 Range/Units





  16:24 19:58 23:06 


 


POC Glucose (mg/dL)  333 H  323 H  262 H  (75-99)  mg/dL














  12/21/20 12/21/20 12/21/20 Range/Units





  03:07 08:02 11:47 


 


POC Glucose (mg/dL)  277 H  310 H  267 H  (75-99)  mg/dL








                      Microbiology - Last 24 Hours (Table)











 12/20/20 01:30 Gram Stain - Preliminary





 Sputum Sputum Culture - Preliminary














Assessment and Plan


Assessment: 





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  

  Etiology is Cardioembolic (has new onset atrial fibrillation per patient 

  during this admission) 


* Acute stroke at different territories (MRI showed scattered foci of 

  hyperintensity within the posterior fossa as well as both cerebral hemisphere 

  involving the right parietal occipital region, left and right frontal region 

  as well as the centrum semiovale old valve bilaterally left greater than the 

  right).  Etiology is likely cardioembolic.


* New onset Atrial fibrillation, on anticoagulation with Xarelto.


* Acute COVID-19 pneumonitis


* New onset diabetes, poorly controlled


* Hypertension


* Hyperlipidemia


* History of CAD.





Plan: 








* MR the brain without gadolinium on 12/21/2020 is reported as diffusion-

  weighted imaging demonstrates scattered foci of hyperintensity within the 

  posterior fossa as well as both cerebral hemisphere involving the right 

  parietal occipital region, left and right frontal region as well as the 

  centrum semiovale old valve bilaterally left greater than the right.  Finding 

  suggest embolic process.


* No large vessel occlusion noted on CTA of head and neck.  


* Patient is on Xarelto 20 mg daily and has been on it since 12/06/2020.  I 

  started the patient on the atorvastatin 40 mg daily for secondary stroke 

  prophylaxis.


* Patient's diabetes is poorly controlled, need to optimize diabetes to target 

  A1c <7.0


* Fasting a.m. lipid panel showed cholesterol 159, , HDL 38 and 

  triglycerides 81.  We will start Lipitor 40 mg daily.


* Blood pressure is well controlled.


* 2-D echo revealed normal left-ventricular size.  Mild concentric LVH, mild 

  global hypokinesis of left ventricle with an EF 45-50% there is paradoxical/d

  yssynergy septal motion consistent with postoperative status.  


* PT and OT are on board.  I consulted the patient


* I CONSULTED cardiology to rule out any thrombus in the left the atrial a

  ppendage and to rule out any large PFO.


* Patient at present sick with COVID-19 infection and will defer management to 

  Pulmonary team and ID team.








Time with Patient: Less than 30

## 2020-12-21 NOTE — PN
PROGRESS NOTE



PULMONARY/CRITICAL CARE PROGRESS NOTE:



DATE OF SERVICE:

12/21/2020



This patient is a 70-year-old gentleman who was admitted with a diagnosis of acute

hypoxemic respiratory failure secondary to acute COVID-19 pneumonitis/pneumonia.  In

addition, the patient was discovered to have new-onset atrial fibrillation with RVR,

new-onset diabetes mellitus, and acute gastroenteritis secondary to COVID infection.

In addition, the patient suffers from benign essential hypertension, CAD, right upper

lobe pulmonary embolism, troponin leak, gout and right upper lobe pulmonary nodule. The

patient was going for an MRI scan today.



Other than that, the patient is doing reasonably well.



PHYSICAL EXAMINATION:

VITAL SIGNS: Current vital signs are reviewed. Temperature 97.6, heart rate 54,

respiratory rate 20, blood pressure 144/58, mean 86, 5-liter saturation 97%.

GENERAL APPEARANCE:  Appears in no acute distress.

HEENT: Examination is grossly unremarkable.

NECK:  Supple. Full range of motion.  No adenopathy.  Neck veins are flat.

CARDIOVASCULAR: Examination reveals regular rhythm and rate.  Heart sounds are distant.

S1, S2 normal.  No S3, S4 or murmur.

LUNGS:  Lungs reveal mostly clear breath sounds.  A few scattered crackles at the

bases.  No wheezes or rhonchi.

ABDOMEN:  Soft. Bowel sounds are heard.  No masses or tenderness.

EXTREMITIES: Intact.  No cyanosis, clubbing or edema.

SKIN: Without rash.

NEUROLOGIC: Neurologic examination is brief but nonfocal.



LABS/IMAGING:

Reviewed.  Nothing to report.



Microbiology is currently negative.



An MRI of the brain done today shows diffusion-weighted imaging demonstrating scattered

foci of hyperintensity within the posterior fossa as well as both cerebral hemispheres

involving the right parietal occipital region, left and right frontal regions as well

as the centrum semiovale bilaterally, left greater than right.  Findings suggest an

embolic process.



CURRENT MEDICATIONS:

Reviewed.  He is on Tylenol, zyloprim, Cordarone, vitamin C, Decadron, insulin,

metoprolol, Narcan, naproxen, Protonix, Xarelto and zinc.



ASSESSMENT:

1. Acute hypoxemic respiratory failure secondary to COVID-19 pneumonitis/pneumonia.

2. New-onset atrial fibrillation with rapid ventricular response.

3. New-onset diabetes mellitus.

4. Acute gastroenteritis secondary to COVID infection.

5. Benign essential hypertension.

6. History of coronary artery disease.

7. Right upper lobe pulmonary embolism; patient currently on Xarelto.

8. Troponin leak.

9. History of gout.

10.History of right upper lobe nodule, to be monitored in the outpatient setting.

11.New-onset right-sided arm weakness, with an MRI showing multiple embolic ischemic

    events.



PLAN:

From the pulmonary standpoint, the patient is doing reasonably well.  The patient

remains on oxygen of 5 L by nasal cannula.  Saturations are excellent.  The patient is

already on a blood thinner, i.e., Xarelto.  Will continue to follow.  Prognosis is

guarded.





MMODL / IJN: [id number] / Job#: [id number]

## 2020-12-22 VITALS — RESPIRATION RATE: 20 BRPM

## 2020-12-22 VITALS — HEART RATE: 52 BPM | SYSTOLIC BLOOD PRESSURE: 116 MMHG | DIASTOLIC BLOOD PRESSURE: 67 MMHG | TEMPERATURE: 97.5 F

## 2020-12-22 LAB
ANION GAP SERPL CALC-SCNC: 2 MMOL/L
BUN SERPL-SCNC: 32 MG/DL (ref 9–20)
CALCIUM SPEC-MCNC: 8.3 MG/DL (ref 8.4–10.2)
CHLORIDE SERPL-SCNC: 100 MMOL/L (ref 98–107)
CO2 SERPL-SCNC: 30 MMOL/L (ref 22–30)
GLUCOSE BLD-MCNC: 213 MG/DL (ref 75–99)
GLUCOSE BLD-MCNC: 238 MG/DL (ref 75–99)
GLUCOSE BLD-MCNC: 264 MG/DL (ref 75–99)
GLUCOSE BLD-MCNC: 339 MG/DL (ref 75–99)
GLUCOSE BLD-MCNC: 407 MG/DL (ref 75–99)
GLUCOSE SERPL-MCNC: 191 MG/DL (ref 74–99)
POTASSIUM SERPL-SCNC: 4.7 MMOL/L (ref 3.5–5.1)
SODIUM SERPL-SCNC: 132 MMOL/L (ref 137–145)

## 2020-12-22 RX ADMIN — AMIODARONE HYDROCHLORIDE SCH MG: 200 TABLET ORAL at 08:43

## 2020-12-22 RX ADMIN — INSULIN ASPART SCH UNIT: 100 INJECTION, SOLUTION INTRAVENOUS; SUBCUTANEOUS at 12:06

## 2020-12-22 RX ADMIN — INSULIN ASPART SCH UNIT: 100 INJECTION, SOLUTION INTRAVENOUS; SUBCUTANEOUS at 16:20

## 2020-12-22 RX ADMIN — PANTOPRAZOLE SODIUM SCH MG: 40 TABLET, DELAYED RELEASE ORAL at 08:43

## 2020-12-22 RX ADMIN — OXYCODONE HYDROCHLORIDE AND ACETAMINOPHEN SCH MG: 500 TABLET ORAL at 08:43

## 2020-12-22 RX ADMIN — DEXTROSE SCH MG: 50 INJECTION, SOLUTION INTRAVENOUS at 08:44

## 2020-12-22 RX ADMIN — METOPROLOL TARTRATE SCH: 50 TABLET, FILM COATED ORAL at 08:50

## 2020-12-22 RX ADMIN — Medication SCH MG: at 08:43

## 2020-12-22 RX ADMIN — INSULIN ASPART SCH UNIT: 100 INJECTION, SOLUTION INTRAVENOUS; SUBCUTANEOUS at 03:33

## 2020-12-22 RX ADMIN — INSULIN ASPART SCH UNIT: 100 INJECTION, SOLUTION INTRAVENOUS; SUBCUTANEOUS at 08:43

## 2020-12-22 RX ADMIN — RIVAROXABAN SCH MG: 20 TABLET, FILM COATED ORAL at 17:12

## 2020-12-22 RX ADMIN — INSULIN ASPART SCH UNIT: 100 INJECTION, SOLUTION INTRAVENOUS; SUBCUTANEOUS at 00:04

## 2020-12-22 NOTE — P.DS
Providers


Date of admission: 


12/05/20 15:32





Attending physician: 


Noelle Henderson





Consults: 





                                        





12/05/20 23:47


Consult Physician Routine 


   Consulting Provider: Dmitri Ruiz


   Consult Reason/Comments: COVID


   Do you want consulting provider notified?: Yes





12/08/20 10:58


Consult Physician Urgent 


   Consulting Provider: Dara Jose


   Consult Reason/Comments: Covid, fever


   Do you want consulting provider notified?: Yes





12/12/20 13:12


Consult Physician Routine 


   Consulting Provider: Magnus Packer


   Consult Reason/Comments: Hypoxia, A TEAM transfer from [address] [place] [address]


   Do you want consulting provider notified?: Yes





12/17/20 11:31


Consult Physician Routine 


   Consulting Provider: Loli Guzman


   Consult Reason/Comments: Possible CVA


   Do you want consulting provider notified?: Yes





12/21/20 15:45


Consult Physician Routine 


   Consulting Provider: Michael Madden


   Consult Reason/Comments: gallito


   Do you want consulting provider notified?: Yes











Primary care physician: 


Nadinealisa MarinaWilmanMerged with [facility]: 





70-year-old male,  who is a patient of Dr. Stovall with a past medical history 

of hypertension,MI coming in with a chief complaint of fatigue and generalized 

weakness that have been ongoing for past 1 week.  Patient states that he has 

been having poor appetite loss of smell and loss of taste for the past 1 week.  

He states that he has been sleeping for more than 20 hours a day.  Patient 

denies having any cough or difficulty in breathing.  He denies having any chest 

pain or palpitations.  He denies having any dysuria or hematuria.  Patient 

denies having any history of diabetes but his blood sugars are in 300s to 400s. 

In the emergency room patient had a chest x-ray showing no acute cardiopulmonary

process and an EKG showing normal sinus rhythm.  He had labs done showing white 

count of 3.9, hemoglobin 14.4, platelets 166.  Sodium 131, potassium 3.9, 

chloride.  BUN 15, creatinine 0.93 C-reactive protein 59, albumin 3.3 

coronavirus PCR positive. 





On 12/6/2020 patient was seen and examined.  He is currently resting comfortably

in bed.  Appears to be no acute distress.  On reviewing the vitals patient 

continues to fever as high as 102.3.  His blood pressure has been stable around 

130s 80s and he saturating at 95% on room air.  Patient's blood sugars have been

running on the higher side.





On 12/07/2020- patient was seen and examined and the general medical floors.  He

is resting comfortably in bed.  Overnight active issues reported by nursing 

staff. Patient states that his difficulty in breathing is improving.  On 

reviewing the vitals patient's T-max is 98.4, heart rate 72, respiratory rate 

127-79 and saturating at 93% on room air.  On reviewing vitals patient's white 

count of 2.8, hemoglobin 14.4, platelets 189.  Sodium 136, potassium 4.6, 

chloride 102.  BUN 23 and creatinine of 1.0.  Blood sugars have been running 

high in 200s to 300s.  C-reactive protein 7.5.








12/08/2020


This is a pleasant 70 years old male who presents with cough with infection and 

found to have new onset diabetes with elevated hemoglobin A1c at 14%.


Patient currently with no respiratory symptoms, no chest pain or dyspnea 

coughing.  Yesterday he has a regular bowel movement but today he had 1 loose 

bowel movement but no significant abdominal pain or nausea vomiting.


Also he is running a fever of 101.  He saturating 90% at room air.


BMP and CBC were reviewed and they were unremarkable except for mild leukopenia 

2.8K.  D-dimer is negative at 0.34.  Coronavirus is detected.  Chest x-ray is 

normal


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  Normal saline at 75 mL/h was 

added.  Also Levemir 10 units daily and metformin 1000 mg twice daily has been 

added because his sugar still not controlled


Patient is able to eat % of his meal





12/9/20


Patient had a rough night because of fever, his breathing is quiet and stable 

and he's only on 2 L oxygen.  Is still have diarrhea about twice per day which 

is similar to the day before.  No abdominal pain or vomiting.  No significant 

coughing.  unstable


Chest x-ray: Worsening infiltrates, Pronecalcitonin is elevated at 0.27, 

patient is started on Rocephin and Zithromax.


He is new onset diabetes and also is on dexamethasone and his sugar more than 

300, so Levemir was decreased from 10-30 units daily.  Also he is on metformin 

1000 and Amaryl 1 mg.


Continue on Xarelto, normocephalic 75, remedisivr, dexamethasone 6 mg daily.





12/10/2020


Patient hadn't good night  because he could not sleep.  No respiratory symptoms,

ongoing generalized weakness.


Yesterday he has watery diarrhea but this morning he had small more formed bowel

movement.  No abdominal pain.


Persistent fever have subsided and his been afebrile for more than 24 hours.  We

will check Tylenol as needed


His sugar is better controlled after increasing his insulin to 30 units.


Antibiotics with ceftriaxone and Zithromax were admitted with high 

proteincalcitonin.  We will try to check sputum culture.


Check labs in the morning





12/11/2020


Patient feels much better today, his fever subsided and his on Tylenol as needed

currently.  No respiratory issue or symptom.  He had a little formed bowel 

movement today.  Currently patient remains on the same cocktail for comfort 

infection besides remedisivr


Hemodynamically stable.  Vitals are stable showing mild leukocytosis of 11.6 K





12/12/2020


pt is moved to select unit , pt developed a fib and RVR and he was started on 

Cardizem and amiodarone drip and is already on Xarelto. 


Also patient on Lopressor 75 mg 3 times a day Also he developed more hypoxic and

is currently on 50 L oxygen via nasal cannula saturating 90%.  


Repeat chest x-ray Moderate peripheral-based opacities throughout both lungs 

with no pleural effusion.  Blood gas on BOTH HIS WITH PH 7.48, LOW PCO2 OF 22 

AND LOW PO2 AT 58 PATIENT HAS BEEN EVALUATED BY PULMONARY SERVICE AND PATIENT 

MAY NEED TO GO TO THE ICU AS WELL.


LEFT SHOWING STABLE LEUKOCYTOSIS AT 14.9 K, ELEVATED D-DIMER 3.1, BMP IS 

UNREMARKABLE AND WORSENING OF THE EDGE AND C-REACTIVE PROTEIN


Patient is kept on remedisivr, dexamethasone 6 mg daily.


However patient diarrhea has improved 








12/13/2020


Patient today kept in the ICU on BiPAP saturating 100% however he looks 

comfortable.  Distal short of breath and dyspneic


Rest of vitals are stable


Laps looks stable, inflammatory markers LDH and C-reactive protein still 

trending up slowly compared to yesterday.proCalcitonin is negative.  D-dimer is

trending up at 5.7


Chest x-ray showing stable bilateral lung infiltrates


He remains on Decadron and remdesivir


Also I kept on Cardizem drip at 20 mg an hour and amiodarone 400 mg twice daily 

and metoprolol 75 mg 3 times a day, vitamin C and A. fib and heart rate ranging 

between .


He has an echo from 07/22/2020 showed ejection fraction of 50-55%.  BNP is 

elevated at 3940.


Discontinue ceftriaxone.  Keep Zithromax for now.  Patient to continue on 

Xarelto 


patient is nothing by mouth WE'LL KEEP HIM ON GENTLE HYDRATION AT 50 ML/H.  ALSO

GOT 1 DOSE OF LASIX





12/14/2020


Patient is seen and evaluated and follow-up continues to be closely monitored in

the ICU.  Patient has been taken off the BiPAP and placed on Airvo with an 

oxygen rate of 60 and FiO2 at 85% and is currently 91%.  To continue to wean as 

tolerated with the use of BiPAP as needed as well.  Patient is afebrile.  

Patient's d-dimer continues to be elevated and has increased at 11.67 and 

patient is maintained on Xarelto and will continue at this time.  Patient is 

eating today and tolerating with no reports of nausea or vomiting noted.  

Multiple medical consultations following including cardiology.  Cardizem drip 

has been decreased to 10 and will continue to monitor this patient continues on 

amiodarone as well.  Heart rate currently in the 70s.  Blood sugars being 

controlled and will continue sliding scale along with long-acting at this time. 

Patient to continue with dexamethasone along with vitamin C and D and zinc 

supplements.  Patient is currently off antibiotics and will continue to monitor 

closely.  Chest x-ray today shows cardiomegaly with bilateral multifocal acute 

infiltrates greatest in the periphery, organizing consolidation in the lower 

lungs with no significant change. 





12/15/2020


Patient seen in follow-up continues to be closely monitored in the ICU.  Patient

is maintained on BiPAP at night and transitioning to Airvo during the day.  

Patient was placed on insulin drip and will continue to monitor Accu-Cheks 

closely.  Patient continues to be severely dyspneic with any exertion and is 89-

90% with an O2 flow of 60 and FiO2 of 85.  IV Cardizem drip has been 

discontinued and patient is maintained on amiodarone oral and will continue at 

this time.  Anticoagulation of Xarelto continues.  Chest x-rays continue to show

worsening and consistent with Covid 19 infection progression. 





12/16/2020


Patient continues to be in the ICU and maintained on BiPAP with airvo during the

day and not really showing any improvements as far as oxygenation.  Patient  

states his breathing has not worsened but is also not improved.  Patient is 

hungry and awaiting to be switched to airvo so he can eat.  Blood sugars 

continue to be elevated and insulin drip was discontinued.  Will increase 

Levemir to 20 units daily and continue with sliding scale.  Patient remains off 

Cardizem drip and is currently on oral amiodarone and will continue at this 

time.  D-dimer also continues to be elevated and is currently 14.01.  Patient is

maintained on Xarelto.  Current sodium is 135, potassium is 4.1, creatinine is 

0.85, WBC is 13.4.  Discussed with the patient about CODE STATUS and patient 

wishes to remain a full code.  Chest x-ray continues to show no improvement from

previous x-ray.





12/17/2020


Patient currently remains in the ICU although awaiting to be transferred to 

[facility].  Patient continues on the Airvo with BiPAP at night.  Patient states 

he was having some right-sided weakness and facial droop since yesterday and CT 

angio done showing atrophy with very ventricular white matter ischemic changes 

with no acute intracranial process, normal [place] of [place], and no flow-

limiting stenosis of the bilateral carotid bifurcations with minimal 

atheromatous calcifications present.  Neurology was consulted for evaluation and

is currently pending.  Patient's chest x-ray today continues to show patchy 

diffuse infiltrates present bilaterally.  Patient is currently tolerating diet 

and remains on sliding scale along with long-acting and will continue at this 

time.





12/18/2020


Patient is seen in follow-up currently on [facility] is a recent transfer to the 

ICU.  Patient is maintained on Airvo and continued on this throughout the night.

 BiPAP was not used.  Patient states his breathing feels somewhat better today. 

Neurology evaluating the patient recommending an MRI which is currently pending 

at this time as patient had some right-sided weakness along with slurred speech.

 Right side upper extremity strength is 4/5 with no deficits noted on the right 

lower extremity.  No slurring of speech noted as well.  Patient is tolerating 

diet with no difficulty in swallowing.  Patient also underwent 2-D echo showing 

mild concentric left ventricular hypertrophy with mild local hypokinesis with 

overall left ventricular systolic function is mildly impaired with an EF between

45 and 50% along with some mitral and tricuspid regurgitation present.  Sugars 

being closely monitored and adjustments have been made to long-acting and will 

continue sliding scale as well.  Patient remains on IV dexamethasone along with 

Xarelto at this time. 





12/19/2020


Patient is awaiting for MRI chest x-ray is still showing significant the 

congestion possibility of pulmonary edema cannot be ruled out.  Patient is off 

Lasix nephrology and pulmonology is following the patient.  Asian may need a 

dose of Lasix early the decision to pulmonary patient remains on 6 L of oxygen. 

Saturating 97%.





12/20/2020


Patient still didn't get an MRI.  Although patient is already on 

anticoagulation.  Patient also requirements have gone down to around 45% patient

is bit hyponatremic probably because of the one dose of Lasix that was given 

yesterday.  Continue to improve but pretty slow








12/21/2020


Patient is on 5 lit of oxygen.  Physical therapy evaluated in the past but not a

bony any subacute rehabilitation as and reevaluate the patient today.  Once 

we're able to wean him off to around 2-3 L patient will be discharged at that 

time patient is otherwise clinically doing well is significantly doing better.








12/22/2020


Patient is presently on 3 L of oxygen.  Patient is bit more hyponatremic believe

patient has mild CHF patient will be given a dose of Lasix patient had an MRI 

which showed scattered foci of hyperintensity in the posterior foci as well as 

both cerebral hemispheres involving the right parietal and occipital areas these

findings are consistent with embolic process.  Neurology recommended the a 

transesophageal echocardiogram because of his Covid status cardiology is not 

planning on transesophageal echocardiogram.  Patient will need placement in 

subacute intimidation patient has been on steroids for long time.  Since 

Decadron is highly potent we need to slowly taper down will cut down his 

Decadron to 60 g daily.  Once we cut down on steroids his insulin requirements 

will go down.








PHYSICAL EXAMINATION: 





GENERAL: The patient is alert and oriented x3, not in any acute distress. Well 

developed, well nourished. 


HEENT: Pupils are round and equally reacting to light. EOMI. No scleral icterus.

No conjunctival pallor. Normocephalic, atraumatic. No pharyngeal erythema. No 

thyromegaly. 


CARDIOVASCULAR: S1 and S2 present. No murmurs, rubs, or gallops. 


PULMONARY: Chest is clear to auscultation, no wheezing or crackles. 


ABDOMEN: Soft, nontender, nondistended, normoactive bowel sounds. No palpable 

organomegaly. 


MUSCULOSKELETAL: No joint swelling or deformity.


EXTREMITIES: No cyanosis, clubbing, or pedal edema. 


NEUROLOGICAL: Gross neurological examination did not reveal any focal deficits. 


SKIN: No rashes. 





Note: Because of COVID 19 isolation, some of the history and physical exam 

findings are indirect and obtained from nursing staff, and other physician 

examinations to avoid unnecessary contact with the patient.





Assessment and Plan


Plan: 





 Covid 19 pneumonia


acute hypoxic respiratory failure secondary to labile and congestive heart 

failure


A. fib with RVR, presently rate controlled on anticoagulation.  Heart rate is in

50 s probably can cut down the amiodarone


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

on statin


Fatigue generalized weakness secondary to Covid infection


New-onset diabetes: Uncontrolled elevated blood sugars due to systemic steroids


-That his heart failure chronic systolic dysfunction with acute exacerbation 

patient has EF of around the 45-50% patient appears to be hypovolemic will give 

him Lasix


Hyponatremia .  Hypervolemic hyponatremia  Congestive heart failure .  Patient 

is presently medically mild acute exacerbation patient will be given a dose of 

Lasix patient will be discharged on Lasix 40 mg daily.  Patient will benefit 

from ACE inhibitor once his blood pressure is stabilized and once he is off 

Decadron


Gastroenteritis secondary to covid infection


Hypomagnesemia, improved


Hypertension


History of MI


DVT prophylaxis: Xarelto


GI prophylaxis: Pepcid


Full code











Patient Condition at Discharge: Stable





Plan - Discharge Summary


Discharge Rx Participant: No


New Discharge Prescriptions: 


New


   Dexamethasone [Decadron] 4 mg PO DAILY #1 tablet


   Furosemide [Lasix] 40 mg PO DAILY #30 tablet


   Insulin Detemir (Levemir) [Levemir] 30 unit SQ DAILY@0700  syr


   Atorvastatin [Lipitor] 40 mg PO HS  tab


   Metoprolol Tartrate [Lopressor] 50 mg PO BID  tab


   INSULIN LISPRO (humaLOG) [humaLOG] 1 injection SQ AS DIRECTED #10 ml


   Amiodarone [Cordarone] 400 mg PO DAILY  tab





Continue


   Allopurinol [Zyloprim] 300 mg PO DAILY


   Naproxen Sodium [Naprelan] 500 mg PO BID PRN


     PRN Reason: Pain


   Omeprazole 20 mg PO DAILY


   Rivaroxaban [Xarelto] 20 mg PO DAILY





Discontinued


   amLODIPine [Norvasc] 5 mg PO BID


   Metoprolol Succinate [Toprol XL] 50 mg PO DAILY


Discharge Medication List





Allopurinol [Zyloprim] 300 mg PO DAILY 07/21/20 [History]


Naproxen Sodium [Naprelan] 500 mg PO BID PRN 12/05/20 [History]


Omeprazole 20 mg PO DAILY 12/05/20 [History]


Rivaroxaban [Xarelto] 20 mg PO DAILY 12/05/20 [History]


Amiodarone [Cordarone] 400 mg PO DAILY  tab 12/22/20 [Rx]


Atorvastatin [Lipitor] 40 mg PO HS  tab 12/22/20 [Rx]


Dexamethasone [Decadron] 4 mg PO DAILY #1 tablet 12/22/20 [Rx]


Furosemide [Lasix] 40 mg PO DAILY #30 tablet 12/22/20 [Rx]


INSULIN LISPRO (humaLOG) [humaLOG] 1 injection SQ AS DIRECTED #10 ml 12/22/20 

[Rx]


Insulin Detemir (Levemir) [Levemir] 30 unit SQ DAILY@0700  syr 12/22/20 [Rx]


Metoprolol Tartrate [Lopressor] 50 mg PO BID  tab 12/22/20 [Rx]








Follow up Appointment(s)/Referral(s): 


Jonah Samaniego MD [STAFF PHYSICIAN] - 2 Weeks (pulmonolgist )


Nadine Stovall DO [Primary Care Provider] - 1-2 days


Nick Thorpe MD [STAFF PHYSICIAN] - 1-2 Days


Activity/Diet/Wound Care/Special Instructions: 


diabetic 1800 k.alfredito per day 





activity is limited till you see your doctor 


[employer] will mail you an agreement form to obtain a glucometer to 

check your blood glucose levels. Please fill this out and mail it back. They 

will mail you a permanent glucometer after that and supplies when they run low. 

Their phone #: [phone] if you have any questions. 


Discharge Disposition: TRANSFER TO SNF/ECF

## 2020-12-22 NOTE — P.PN
Subjective


Progress Note Date: 12/22/20


The patient was seen at bedside and he states that he's doing better today 

compared to yesterday.  He feels a little bit more energetic today compared to 

yesterday.  He denies any worsening of his weakness.  He denies visual 

disturbance, getting his words out.





I consulted the cardiology for a transesophageal echocardiogram but that they 

stated that the they will not do it since old not provide a more information and

since he is a COVID-19 positive. 








Objective





- Vital Signs


Vital signs: 


                                   Vital Signs











Temp  97.5 F L  12/22/20 15:37


 


Pulse  52 L  12/22/20 15:37


 


Resp  20   12/22/20 15:37


 


BP  116/67   12/22/20 15:37


 


Pulse Ox  97   12/22/20 15:37








                                 Intake & Output











 12/21/20 12/22/20 12/22/20





 18:59 06:59 18:59


 


Intake Total 1140 540 480


 


Output Total 1000 3 1000


 


Balance 140 537 -520


 


Weight  83 kg 


 


Intake:   


 


  Oral 1140 540 480


 


Output:   


 


  Urine 1000 3 1000


 


Other:   


 


  # Voids  600 














- Exam


On examination patient is an elderly  male, laying in the bed, in mild 

to moderate respiratory distress.  





Neurological exam:


Higher mental function: The patient is awake alert oriented to self, place and 

time.  Following simple commands.  No aphasia or neglect.


Cranial Nerves: The pupils are round, equal and reactive to light bilaterally.  

Ocular movement is intact and no nystagmus noted bilaterally.


Mild right facial asymmetry.  Mild dysarthria.  Tongue is midline and moved 

side-to-side without any difficulty.


Motor: Gait deferred.  On muscle strength testing patient's strength is normal 

on the left side.  On the right side, deltoid 5-, biceps 5-, triceps 5,  

4+5-, hip flexion 5-.  Tone and bulk of muscles normal.  


Cerebellar: Has mild ataxia for finger-to-nose on the right.  Heel to shin in 

normal bilaterally.


Sensory touch is equal.   





- Labs


CBC & Chem 7: 


                                 12/19/20 07:19





                                 12/22/20 08:32


Labs: 


                  Abnormal Lab Results - Last 24 Hours (Table)











  12/21/20 12/21/20 12/22/20 Range/Units





  20:32 23:37 03:27 


 


Sodium     (137-145)  mmol/L


 


BUN     (9-20)  mg/dL


 


Glucose     (74-99)  mg/dL


 


POC Glucose (mg/dL)  299 H  318 H  238 H  (75-99)  mg/dL


 


Calcium     (8.4-10.2)  mg/dL














  12/22/20 12/22/20 12/22/20 Range/Units





  08:05 08:32 11:47 


 


Sodium   132 L   (137-145)  mmol/L


 


BUN   32 H   (9-20)  mg/dL


 


Glucose   191 H   (74-99)  mg/dL


 


POC Glucose (mg/dL)  213 H   264 H  (75-99)  mg/dL


 


Calcium   8.3 L   (8.4-10.2)  mg/dL














  12/22/20 12/22/20 Range/Units





  16:05 16:08 


 


Sodium    (137-145)  mmol/L


 


BUN    (9-20)  mg/dL


 


Glucose    (74-99)  mg/dL


 


POC Glucose (mg/dL)  407 H  339 H  (75-99)  mg/dL


 


Calcium    (8.4-10.2)  mg/dL








                      Microbiology - Last 24 Hours (Table)











 12/20/20 01:30 Gram Stain - Final





 Sputum Sputum Culture - Final














Assessment and Plan


Assessment: 





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  

  Etiology is Cardioembolic (has new onset atrial fibrillation per patient 

  during this admission) 


* Acute stroke at different territories (MRI showed scattered foci of 

  hyperintensity within the posterior fossa as well as both cerebral hemisphere 

  involving the right parietal occipital region, left and right frontal region 

  as well as the centrum semiovale old valve bilaterally left greater than the 

  right).  Etiology is likely cardioembolic.


* New onset Atrial fibrillation, on anticoagulation with Xarelto.


* Acute COVID-19 pneumonitis


* New onset diabetes, poorly controlled


* Hypertension


* Hyperlipidemia


* History of CAD.





Plan: 








* MR the brain without gadolinium on 12/21/2020 is reported as diffusion-

  weighted imaging demonstrates scattered foci of hyperintensity within the 

  posterior fossa as well as both cerebral hemisphere involving the right 

  parietal occipital region, left and right frontal region as well as the 

  centrum semiovale old valve bilaterally left greater than the right.  Finding 

  suggest embolic process.


* No large vessel occlusion noted on CTA of head and neck.  


* Patient is on Xarelto 20 mg daily and has been on it since 12/06/2020.  

  Continue atorvastatin 40 mg daily for secondary stroke prophylaxis.


* Patient's diabetes is poorly controlled, need to optimize diabetes to target 

  A1c <7.0


* Fasting a.m. lipid panel showed cholesterol 159, , HDL 38 and 

  triglycerides 81.  We will start Lipitor 40 mg daily.


* Blood pressure is well controlled.


* 2-D echo revealed normal left-ventricular size.  Mild concentric LVH, mild 

  global hypokinesis of left ventricle with an EF 45-50% there is p

  aradoxical/dyssynergy septal motion consistent with postoperative status.  


* PT and OT are on board and SLP are consulted.


* Cardiology to rule out any thrombus in the left the atrial appendage and to 

  rule out any large PFO.  Was notified by the cardiology nurse practitioner 

  that the cardiology attending does not feel like OB any beneficial and the 

  since the patient is Covid 19 there is no need for it.  An echo was performed 

  this morning with bubble study with no evidence of PFO.


* Patient at present sick with COVID-19 infection and will defer management to 

  Pulmonary team and ID team.


* The patient was notified that he needs to follow-up with a neurologist within 

  1-2 weeks upon discharge.  He also needs to follow-up with a cardiologist as 

  an outpatient as well.





Plan was discussed with the patient as well as the primary team.





Brandon Ruiz MD


Neuro-hospitalist


Time with Patient: Less than 30

## 2020-12-22 NOTE — ECHOF
Referral Reason:r/o PFO



MEASUREMENTS

--------

HEIGHT: 182.9 cm

WEIGHT: 113.4 kg

BP: 







FINDINGS

--------

Echo done 12/18/20 Limited study done for bubble study, no crossing noted.



CONCLUSIONS

--------

1. Echo done 12/18/20 Limited study done for bubble study, no crossing noted.





SONOGRAPHER: Jordana Kellogg RDCS

## 2020-12-22 NOTE — PN
PROGRESS NOTE



PULMONARY/CRITICAL CARE PROGRESS NOTE:



DATE OF SERVICE:

December 22, 2020



This is a patient who is 70 years of age.  He was admitted back on December 5 with a

diagnosis of acute hypoxemic respiratory failure secondary to acute COVID-19

pneumonia/pneumonitis.  The patient was also found to have atrial fibrillation with

RVR, new onset diabetes mellitus, and acute gastroenteritis secondary to COVID

infection.  Currently, the patient is doing reasonably well.  The patient feels much

better.  The patient is hoping to be discharged relatively soon.



PHYSICAL EXAMINATION:

VITAL SIGNS: Current vital signs are reviewed. Temperature is 97.9, heart rate 54,

respiratory rate 20, blood pressure 131/83, mean 99 and 2 L saturation 96%.

GENERAL: Appears in no acute distress.  Certainly no respiratory distress, audible

wheezing, use of accessory muscles or conversational dyspnea.

HEENT: Examination is grossly unremarkable.

NECK:  Supple. Full range of motion.  No adenopathy.  Neck veins are flat.

CARDIOVASCULAR: Examination reveals regular rhythm and rate.  S1, S2 normal.  Heart

rate 54.  No murmur.

LUNGS:  Reveal mostly clear breath sounds.  A few scattered rhonchi.  No wheezes or

crackles.

ABDOMEN:  Soft. Bowel sounds are heard.

EXTREMITIES: Are intact.  No cyanosis, clubbing, or edema.

SKIN: Without rash.

NEUROLOGIC: Examination is brief but nonfocal.



LABS:

Labs are reviewed.  Sodium 132, potassium 4.7, chloride 100, CO2 of 30. Anion gap is 2.

BUN and creatinine were 32 and 0.76.



Microbiology is currently negative.



No recent chest x-ray.



Brain MRI was discussed in my note yesterday.



CURRENT MEDICATIONS:

Current medications are reviewed.  He is currently on Tylenol, Zyloprim, amiodarone,

ascorbic acid, Lipitor, dexamethasone, insulin, metoprolol, Narcan, naproxen, Protonix,

Xarelto, and zinc.



ASSESSMENT:

1. Acute hypoxemic respiratory failure secondary to COVID-19 pneumonitis/pneumonia.

2. New onset atrial fibrillation with RVR.

3. New onset diabetes mellitus.

4. Acute gastroenteritis secondary to COVID infection.

5. Benign essential hypertension.

6. History of coronary artery disease.

7. Right upper lobe pulmonary embolism, patient currently on Xarelto.

8. History of troponin leak.

9. History of gout.

10.History of right upper lobe nodule to be monitored in the outpatient setting.

11.New onset right-sided arm weakness with MRI showing multiple embolic/ischemic

    event.



PLAN:

Currently, from the pulmonary standpoint, the patient could be discharged.  The patient

is already on blood thinners.  The MRI was reviewed.  The Decadron can be discontinued

if he has had it for 10 days.  Certainly it could be switched to p.o.  No additional

recommendations are made.  Prognosis is guarded.





MMODL / IJN: [id number] / Job#: [id number]

## 2020-12-22 NOTE — P.PN
Progress Note - Text


Progress Note Date: 12/22/20


REASON FOR FOLLOWUP:


COVID-19 infection.





INTERVAL HISTORY:


The patient is  afebrile. The pt is breathing more comfortably.  Patient denies


having any chest pain.  Occasional cough.  No abdominal pain or diarrhea.





PHYSICAL EXAMINATION:


Blood pressure 140/80 with a pulse of 55, temperature 97.7. He is 96% on 8 L 

high-flow


oxygen.


General description is an elderly male up in the chair in no distress.


RESPIRATORY SYSTEM: Unlabored breathing with decreased intensity of breath 

sounds. No


wheeze.


HEART: S1, S2.  Regular rate and rhythm.


ABDOMEN:  Soft, no tenderness.





LABS: reviewed





DIAGNOSTIC IMPRESSION AND PLAN:


Patient with acute COVID-19 pneumonia in this patient with slow clinical improv

ement,


has completed his remdesivir therapy; currently on dexamethasone to finish his 


10 day course of therapy and close out patient follow up

## 2020-12-22 NOTE — P.PN
Subjective





70-year-old male,  who is a patient of Dr. Stovall with a past medical history 

of hypertension,MI coming in with a chief complaint of fatigue and generalized 

weakness that have been ongoing for past 1 week.  Patient states that he has 

been having poor appetite loss of smell and loss of taste for the past 1 week.  

He states that he has been sleeping for more than 20 hours a day.  Patient 

denies having any cough or difficulty in breathing.  He denies having any chest 

pain or palpitations.  He denies having any dysuria or hematuria.  Patient den

ies having any history of diabetes but his blood sugars are in 300s to 400s.  In

the emergency room patient had a chest x-ray showing no acute cardiopulmonary 

process and an EKG showing normal sinus rhythm.  He had labs done showing white 

count of 3.9, hemoglobin 14.4, platelets 166.  Sodium 131, potassium 3.9, 

chloride.  BUN 15, creatinine 0.93 C-reactive protein 59, albumin 3.3 

coronavirus PCR positive. 





On 12/6/2020 patient was seen and examined.  He is currently resting comfortably

in bed.  Appears to be no acute distress.  On reviewing the vitals patient 

continues to fever as high as 102.3.  His blood pressure has been stable around 

130s 80s and he saturating at 95% on room air.  Patient's blood sugars have been

running on the higher side.





On 12/07/2020- patient was seen and examined and the general medical floors.  He

is resting comfortably in bed.  Overnight active issues reported by nursing 

staff. Patient states that his difficulty in breathing is improving.  On 

reviewing the vitals patient's T-max is 98.4, heart rate 72, respiratory rate 

127-79 and saturating at 93% on room air.  On reviewing vitals patient's white 

count of 2.8, hemoglobin 14.4, platelets 189.  Sodium 136, potassium 4.6, 

chloride 102.  BUN 23 and creatinine of 1.0.  Blood sugars have been running 

high in 200s to 300s.  C-reactive protein 7.5.








12/08/2020


This is a pleasant 70 years old male who presents with cough with infection and 

found to have new onset diabetes with elevated hemoglobin A1c at 14%.


Patient currently with no respiratory symptoms, no chest pain or dyspnea 

coughing.  Yesterday he has a regular bowel movement but today he had 1 loose 

bowel movement but no significant abdominal pain or nausea vomiting.


Also he is running a fever of 101.  He saturating 90% at room air.


BMP and CBC were reviewed and they were unremarkable except for mild leukopenia 

2.8K.  D-dimer is negative at 0.34.  Coronavirus is detected.  Chest x-ray is 

normal


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  Normal saline at 75 mL/h was 

added.  Also Levemir 10 units daily and metformin 1000 mg twice daily has been 

added because his sugar still not controlled


Patient is able to eat % of his meal





12/9/20


Patient had a rough night because of fever, his breathing is quiet and stable 

and he's only on 2 L oxygen.  Is still have diarrhea about twice per day which 

is similar to the day before.  No abdominal pain or vomiting.  No significant 

coughing.  unstable


Chest x-ray: Worsening infiltrates, Pronecalcitonin is elevated at 0.27, 

patient is started on Rocephin and Zithromax.


He is new onset diabetes and also is on dexamethasone and his sugar more than 

300, so Levemir was decreased from 10-30 units daily.  Also he is on metformin 

1000 and Amaryl 1 mg.


Continue on Xarelto, normocephalic 75, remedisivr, dexamethasone 6 mg daily.





12/10/2020


Patient hadn't good night  because he could not sleep.  No respiratory symptoms,

ongoing generalized weakness.


Yesterday he has watery diarrhea but this morning he had small more formed bowel

movement.  No abdominal pain.


Persistent fever have subsided and his been afebrile for more than 24 hours.  We

will check Tylenol as needed


His sugar is better controlled after increasing his insulin to 30 units.


Antibiotics with ceftriaxone and Zithromax were admitted with high 

proteincalcitonin.  We will try to check sputum culture.


Check labs in the morning





12/11/2020


Patient feels much better today, his fever subsided and his on Tylenol as needed

currently.  No respiratory issue or symptom.  He had a little formed bowel 

movement today.  Currently patient remains on the same cocktail for comfort 

infection besides remedisivr


Hemodynamically stable.  Vitals are stable showing mild leukocytosis of 11.6 K





12/12/2020


pt is moved to [facility] unit , pt developed a fib and RVR and he was started on 

Cardizem and amiodarone drip and is already on Xarelto. 


Also patient on Lopressor 75 mg 3 times a day Also he developed more hypoxic and

is currently on 50 L oxygen via nasal cannula saturating 90%.  


Repeat chest x-ray Moderate peripheral-based opacities throughout both lungs 

with no pleural effusion.  Blood gas on BOTH HIS WITH PH 7.48, LOW PCO2 OF 22 

AND LOW PO2 AT 58 PATIENT HAS BEEN EVALUATED BY PULMONARY SERVICE AND PATIENT 

MAY NEED TO GO TO THE ICU AS WELL.


LEFT SHOWING STABLE LEUKOCYTOSIS AT 14.9 K, ELEVATED D-DIMER 3.1, BMP IS 

UNREMARKABLE AND WORSENING OF THE EDGE AND C-REACTIVE PROTEIN


Patient is kept on remedisivr, dexamethasone 6 mg daily.


However patient diarrhea has improved 








12/13/2020


Patient today kept in the ICU on BiPAP saturating 100% however he looks 

comfortable.  Distal short of breath and dyspneic


Rest of vitals are stable


Laps looks stable, inflammatory markers LDH and C-reactive protein still 

trending up slowly compared to yesterday.proCalcitonin is negative.  D-dimer is

trending up at 5.7


Chest x-ray showing stable bilateral lung infiltrates


He remains on Decadron and remdesivir


Also I kept on Cardizem drip at 20 mg an hour and amiodarone 400 mg twice daily 

and metoprolol 75 mg 3 times a day, vitamin C and A. fib and heart rate ranging 

between .


He has an echo from 07/22/2020 showed ejection fraction of 50-55%.  BNP is 

elevated at 3940.


Discontinue ceftriaxone.  Keep Zithromax for now.  Patient to continue on 

Xarelto 


patient is nothing by mouth WE'LL KEEP HIM ON GENTLE HYDRATION AT 50 ML/H.  ALSO

GOT 1 DOSE OF LASIX





12/14/2020


Patient is seen and evaluated and follow-up continues to be closely monitored in

the ICU.  Patient has been taken off the BiPAP and placed on Airvo with an oxy

gen rate of 60 and FiO2 at 85% and is currently 91%.  To continue to wean as 

tolerated with the use of BiPAP as needed as well.  Patient is afebrile.  

Patient's d-dimer continues to be elevated and has increased at 11.67 and 

patient is maintained on Xarelto and will continue at this time.  Patient is 

eating today and tolerating with no reports of nausea or vomiting noted.  

Multiple medical consultations following including cardiology.  Cardizem drip 

has been decreased to 10 and will continue to monitor this patient continues on 

amiodarone as well.  Heart rate currently in the 70s.  Blood sugars being 

controlled and will continue sliding scale along with long-acting at this time. 

Patient to continue with dexamethasone along with vitamin C and D and zinc 

supplements.  Patient is currently off antibiotics and will continue to monitor 

closely.  Chest x-ray today shows cardiomegaly with bilateral multifocal acute 

infiltrates greatest in the periphery, organizing consolidation in the lower 

lungs with no significant change. 





12/15/2020


Patient seen in follow-up continues to be closely monitored in the ICU.  Patient

is maintained on BiPAP at night and transitioning to Airvo during the day.  

Patient was placed on insulin drip and will continue to monitor Accu-Cheks 

closely.  Patient continues to be severely dyspneic with any exertion and is 89-

90% with an O2 flow of 60 and FiO2 of 85.  IV Cardizem drip has been 

discontinued and patient is maintained on amiodarone oral and will continue at 

this time.  Anticoagulation of Xarelto continues.  Chest x-rays continue to show

worsening and consistent with Covid 19 infection progression. 





12/16/2020


Patient continues to be in the ICU and maintained on BiPAP with airvo during the

day and not really showing any improvements as far as oxygenation.  Patient  

states his breathing has not worsened but is also not improved.  Patient is hun

gry and awaiting to be switched to airvo so he can eat.  Blood sugars continue 

to be elevated and insulin drip was discontinued.  Will increase Levemir to 20 

units daily and continue with sliding scale.  Patient remains off Cardizem drip 

and is currently on oral amiodarone and will continue at this time.  D-dimer 

also continues to be elevated and is currently 14.01.  Patient is maintained on 

Xarelto.  Current sodium is 135, potassium is 4.1, creatinine is 0.85, WBC is 

13.4.  Discussed with the patient about CODE STATUS and patient wishes to remain

a full code.  Chest x-ray continues to show no improvement from previous x-ray.





12/17/2020


Patient currently remains in the ICU although awaiting to be transferred to 

[facility].  Patient continues on the Airvo with BiPAP at night.  Patient states 

he was having some right-sided weakness and facial droop since yesterday and CT 

angio done showing atrophy with very ventricular white matter ischemic changes 

with no acute intracranial process, normal Andreafski of [place], and no flow-

limiting stenosis of the bilateral carotid bifurcations with minimal 

atheromatous calcifications present.  Neurology was consulted for evaluation and

is currently pending.  Patient's chest x-ray today continues to show patchy 

diffuse infiltrates present bilaterally.  Patient is currently tolerating diet 

and remains on sliding scale along with long-acting and will continue at this 

time.





12/18/2020


Patient is seen in follow-up currently on [facility] is a recent transfer to the 

ICU.  Patient is maintained on Airvo and continued on this throughout the night.

 BiPAP was not used.  Patient states his breathing feels somewhat better today. 

Neurology evaluating the patient recommending an MRI which is currently pending 

at this time as patient had some right-sided weakness along with slurred speech.

 Right side upper extremity strength is 4/5 with no deficits noted on the right 

lower extremity.  No slurring of speech noted as well.  Patient is tolerating 

diet with no difficulty in swallowing.  Patient also underwent 2-D echo showing 

mild concentric left ventricular hypertrophy with mild local hypokinesis with 

overall left ventricular systolic function is mildly impaired with an EF between

45 and 50% along with some mitral and tricuspid regurgitation present.  Sugars 

being closely monitored and adjustments have been made to long-acting and will 

continue sliding scale as well.  Patient remains on IV dexamethasone along with 

Xarelto at this time. 





12/19/2020


Patient is awaiting for MRI chest x-ray is still showing significant the 

congestion possibility of pulmonary edema cannot be ruled out.  Patient is off 

Lasix nephrology and pulmonology is following the patient.  Asian may need a 

dose of Lasix early the decision to pulmonary patient remains on 6 L of oxygen. 

Saturating 97%.





12/20/2020


Patient still didn't get an MRI.  Although patient is already on 

anticoagulation.  Patient also requirements have gone down to around 45% patient

is bit hyponatremic probably because of the one dose of Lasix that was given 

yesterday.  Continue to improve but pretty slow








12/21/2020


Patient is on 5 lit of oxygen.  Physical therapy evaluated in the past but not a

bony any subacute rehabilitation as and reevaluate the patient today.  Once 

we're able to wean him off to around 2-3 L patient will be discharged at that 

time patient is otherwise clinically doing well is significantly doing better.








12/22/2020


Patient is presently on 3 L of oxygen.  Patient is bit more hyponatremic believe

patient has mild CHF patient will be given a dose of Lasix patient had an MRI 

which showed scattered foci of hyperintensity in the posterior foci as well as 

both cerebral hemispheres involving the right parietal and occipital areas these

findings are consistent with embolic process.  Neurology recommended the a 

transesophageal echocardiogram because of his Covid status cardiology is not 

planning on transesophageal echocardiogram.  Patient will need placement in 

subacute intimidation patient has been on steroids for long time.  Since 

Decadron is highly potent we need to slowly taper down will cut down his 

Decadron to 60 g daily.  Once we cut down on steroids his insulin requirements 

will go down








CONSTITUTIONAL: No fever, no malaise, no fatigue. 


HEENT: No recent visual problems or hearing problems. Denied any sore throat. 


CARDIOVASCULAR: No  orthopnea, PND, no palpitations, no syncope. 


PULMONARY: reports continued shortness of breath, no cough, no hemoptysis. 


GASTROINTESTINAL: No diarrhea, no nausea, no vomiting, no abdominal pain. 

Normoactive bowel sounds. 


NEUROLOGICAL: No headaches, weakness improved





Objective





- Vital Signs


Vital signs: 


                                   Vital Signs











Temp  98 F   12/22/20 03:46


 


Pulse  52 L  12/22/20 03:46


 


Resp  16   12/22/20 03:46


 


BP  136/82   12/22/20 03:46


 


Pulse Ox  94 L  12/22/20 03:46








                                 Intake & Output











 12/21/20 12/22/20 12/22/20





 18:59 06:59 18:59


 


Intake Total 1140 540 


 


Output Total 1000 3 


 


Balance 140 537 


 


Weight  83 kg 


 


Intake:   


 


  Oral 1140 540 


 


Output:   


 


  Urine 1000 3 


 


Other:   


 


  # Voids  600 














- Exam








PHYSICAL EXAMINATION: 





GENERAL: The patient is alert and oriented x3, not in any acute distress. Well 

developed, well nourished. 


HEENT: Pupils are round and equally reacting to light. EOMI. No scleral icterus.

No conjunctival pallor. Normocephalic, atraumatic. No pharyngeal erythema. No 

thyromegaly. 


CARDIOVASCULAR: S1 and S2 present. No murmurs, rubs, or gallops. 


PULMONARY: Chest is clear to auscultation, no wheezing or crackles. 


ABDOMEN: Soft, nontender, nondistended, normoactive bowel sounds. No palpable 

organomegaly. 


MUSCULOSKELETAL: No joint swelling or deformity.


EXTREMITIES: No cyanosis, clubbing, or pedal edema. 


NEUROLOGICAL: Gross neurological examination did not reveal any focal deficits. 


SKIN: No rashes. 





Note: Because of COVID 19 isolation, some of the history and physical exam 

findings are indirect and obtained from nursing staff, and other physician 

examinations to avoid unnecessary contact with the patient.











- Labs


CBC & Chem 7: 


                                 12/19/20 07:19





                                 12/22/20 08:32


Labs: 


                  Abnormal Lab Results - Last 24 Hours (Table)











  12/21/20 12/21/20 12/21/20 Range/Units





  11:47 16:02 20:32 


 


Sodium     (137-145)  mmol/L


 


BUN     (9-20)  mg/dL


 


Glucose     (74-99)  mg/dL


 


POC Glucose (mg/dL)  267 H  364 H  299 H  (75-99)  mg/dL


 


Calcium     (8.4-10.2)  mg/dL














  12/21/20 12/22/20 12/22/20 Range/Units





  23:37 03:27 08:05 


 


Sodium     (137-145)  mmol/L


 


BUN     (9-20)  mg/dL


 


Glucose     (74-99)  mg/dL


 


POC Glucose (mg/dL)  318 H  238 H  213 H  (75-99)  mg/dL


 


Calcium     (8.4-10.2)  mg/dL














  12/22/20 Range/Units





  08:32 


 


Sodium  132 L  (137-145)  mmol/L


 


BUN  32 H  (9-20)  mg/dL


 


Glucose  191 H  (74-99)  mg/dL


 


POC Glucose (mg/dL)   (75-99)  mg/dL


 


Calcium  8.3 L  (8.4-10.2)  mg/dL








                      Microbiology - Last 24 Hours (Table)











 12/20/20 01:30 Gram Stain - Final





 Sputum Sputum Culture - Final














Assessment and Plan


Plan: 





 Covid 19 pneumonia


acute hypoxic respiratory failure secondary to labile and congestive heart 

failure


A. fib with RVR, presently rate controlled on anticoagulation.  Heart rate is in

50 physis probably can cut down the amiodarone


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

on statin


Fatigue generalized weakness secondary to Covid infection


New-onset diabetes: Uncontrolled elevated blood sugars due to systemic steroids


-That his heart failure chronic systolic dysfunction with acute exacerbation 

patient has EF of around the 45-50% patient appears to be hypovolemic will give 

him Lasix


Hyponatremia .  Hypervolemic hyponatremia  Congestive heart failure .  Patient 

is presently medically mild acute exacerbation patient will be given a dose of 

Lasix patient will be discharged on Lasix 40 mg daily.  Patient will benefit 

from ACE inhibitor once his blood pressure is stabilized and once he is off 

Decadron


Gastroenteritis secondary to covid infection


Hypomagnesemia, improved


Hypertension


History of MI


DVT prophylaxis: Xarelto


GI prophylaxis: Pepcid


Full code





Plan: 


Continue current medications and management.  Multiple medical consultations 

following.  MRI results as mentioned above.  Patient is on anticoagulation.  No 

slurred speech noted on exam and right side upper extremity weakness has 

improved with a strength of 4+/5 with no deficits noted of the right lower 

extremity.  Adjustments to long-acting insulin have been made and will continue 

sliding scale and Accu-Cheks before meals at bedtime.    Continue to wean FiO2 

as tolerated.  PT/OT to evaluate the patient as patient continues to be 

extremely weak.   Prognosis is poor and extremely guarded.

## 2020-12-22 NOTE — P.PN
Subjective


Progress Note Date: 12/22/20





HISTORY OF PRESENT ILLNESS:  Patient examined at the bedside. He denies chest 

pain or pressure. Denies shortness of breath. Patient remains on xarelto. Vital 

signs are stable. 





PHYSICAL EXAM: 


VITAL SIGNS: Reviewed.


GENERAL: Well-developed in no acute distress. 


NECK: Supple. No JVD or thyromegaly


LUNGS: Respirations even and unlabored. Lungs essentially clear to auscultation 

bilaterally.


HEART: Regular rate and rhythm.  S1 and S2 heard.


EXTREMITIES: Normal range of motion.  No clubbing or cyanosis.  Peripheral 

pulses intact.  No lower extremity edema





ASSESSMENT: 


Paraoxysmal atrial fibrillation with RVR, maintaining SR


Acute covid 19


History of PE


CVA





PLAN: 


Cardiology was reconsulted for requested CB. Patient is already on 

anticoagulation. Per Dr. Madden, CB will not change the course of patients 

treatment and is not necessary at this time, especially with the patients covid 

status. Echo performed this morning with bubble study with no evidence of PFO. 

No further intervention from a cardiac standpoint. 





Nurse practitioner note has been reviewed by physician. Signing provider agrees 

with the documented findings, assessment, and plan of care. 








Objective





- Vital Signs


Vital signs: 


                                   Vital Signs











Temp  97.9 F   12/22/20 11:52


 


Pulse  54 L  12/22/20 11:52


 


Resp  20   12/22/20 11:52


 


BP  131/83   12/22/20 11:52


 


Pulse Ox  96   12/22/20 11:52








                                 Intake & Output











 12/21/20 12/22/20 12/22/20





 18:59 06:59 18:59


 


Intake Total 1140 540 


 


Output Total 1000 3 500


 


Balance 140 537 -500


 


Weight  83 kg 


 


Intake:   


 


  Oral 1140 540 


 


Output:   


 


  Urine 1000 3 500


 


Other:   


 


  # Voids  600 














- Labs


CBC & Chem 7: 


                                 12/19/20 07:19





                                 12/22/20 08:32


Labs: 


                  Abnormal Lab Results - Last 24 Hours (Table)











  12/21/20 12/21/20 12/21/20 Range/Units





  16:02 20:32 23:37 


 


Sodium     (137-145)  mmol/L


 


BUN     (9-20)  mg/dL


 


Glucose     (74-99)  mg/dL


 


POC Glucose (mg/dL)  364 H  299 H  318 H  (75-99)  mg/dL


 


Calcium     (8.4-10.2)  mg/dL














  12/22/20 12/22/20 12/22/20 Range/Units





  03:27 08:05 08:32 


 


Sodium    132 L  (137-145)  mmol/L


 


BUN    32 H  (9-20)  mg/dL


 


Glucose    191 H  (74-99)  mg/dL


 


POC Glucose (mg/dL)  238 H  213 H   (75-99)  mg/dL


 


Calcium    8.3 L  (8.4-10.2)  mg/dL














  12/22/20 Range/Units





  11:47 


 


Sodium   (137-145)  mmol/L


 


BUN   (9-20)  mg/dL


 


Glucose   (74-99)  mg/dL


 


POC Glucose (mg/dL)  264 H  (75-99)  mg/dL


 


Calcium   (8.4-10.2)  mg/dL








                      Microbiology - Last 24 Hours (Table)











 12/20/20 01:30 Gram Stain - Final





 Sputum Sputum Culture - Final

## 2021-03-29 NOTE — P.PN
Subjective


Progress Note Date: 12/13/20











On 12/13/2020, the patient got transferred to the intensive care unit overnight.

 I saw him in a medical floor and the patient was having acute hypoxic 

respiratory failure secondary to coronavirus/Covid 19 related pneumonia.  The 

patient was in the 100%.  After arriving to the intensive care unit, the patient

had to be placed on a BiPAP which is currently running at a pressure of 12/6 cm 

of water with an FiO2 of 20%.  His pulse ox currently is 96%.  Blood gases are 

pending from this morning.  He is resting comfortably on the BiPAP and he is 

able to tolerated without any major difficulties.  His cardiac rhythm has slowed

down.  He remains on atrial fibrillation.  He is on a Cardizem drip running at 

20 mg an hour.  He is also on metoprolol at a dose of 75 mg by mouth three a day

and amiodarone 400 mg by mouth twice a day.  Patient is on Xarelto.  D-dimer is 

elevated which is consistent with his coronavirus/Covid 19 related infection.  

His white cell count of 13.5.  His LDH level is at 904.  The proBNP level was 

3940 and his troponin was at 0.142.  I gave him a dose of Lasix yesterday.  His 

fluid balance over the past 24 hours has been -996 mL.  The chest x-ray still 

showing diffuse bilateral pulmonary infiltrates which is essentially unchanged 

compared to yesterday.  There are diffuse peripheral infiltrates bilaterally.





Objective





- Vital Signs


Vital signs: 


                                   Vital Signs











Temp  97.2 F L  12/13/20 08:00


 


Pulse  80   12/13/20 08:00


 


Resp  22   12/13/20 08:00


 


BP  117/76   12/13/20 08:00


 


Pulse Ox  96   12/13/20 08:00








                                 Intake & Output











 12/12/20 12/13/20 12/13/20





 18:59 06:59 18:59


 


Intake Total 1059.833 778.666 100


 


Output Total 2100 725 0


 


Balance -1040.167 53.666 100


 


Intake:   


 


  IV  300 100


 


    Sodium Chloride 0.9% 1,  300 100





    000 ml @ 50 mls/hr IV .   





    Q20H [facility] Rx#:[id number]   


 


  Intake, IV Titration 179.833 238.666 





  Amount   


 


    Diltiazem 125 mg In 179.833 238.666 





    Sodium Chloride 0.9% 100   





    ml @ 20 MG/HR 20 mls/hr   





    IV .Q6H15M [facility] Rx#:   





    [id number]   


 


  Oral 880 240 


 


Output:   


 


  Urine 2100 725 0


 


Other:   


 


  Voiding Method Toilet Urinal 


 


  # Voids  1 


 


  # Bowel Movements 1  














- Exam








GENERAL EXAM: Alert, pleasant 70-year-old gentleman, the patient is currently on

100% nonrebreather facemask.  The patient is a mild degree of respiratory 

distress.  The patient is on a BiPAP which she is tolerating it well at a 

pressure of 12/60 cm of water


HEAD: Normocephalic.


EYES: Normal reaction of pupils, equal size.


NOSE: Clear with pink turbinates.


THROAT: No erythema or exudates.


NECK: No masses, no JVD.


CHEST: No chest wall deformity.


LUNGS: Equal air entry with no crackles, wheeze, rhonchi or dullness.


CVS: Irregular rhythm with a slower ventricular response with atrial 

fibrillation, irregular S1-S2.  No significant murmurs appreciated.


ABDOMEN: No hepatosplenomegaly, normal bowel sounds, no guarding or rigidity.


SPINE: No scoliosis or deformity


SKIN: No rashes


CENTRAL NERVOUS SYSTEM: No focal deficits, tone is normal in all 4 extremities.


EXTREMITIES: There is no peripheral edema.  No clubbing, no cyanosis.  

Peripheral pulses are intact.








- Labs


CBC & Chem 7: 


                                 12/13/20 04:13





                                 12/13/20 04:13


Labs: 


                  Abnormal Lab Results - Last 24 Hours (Table)











  12/12/20 12/12/20 12/12/20 Range/Units





  09:11 09:11 09:11 


 


WBC  14.4 H    (3.8-10.6)  k/uL


 


RBC     (4.30-5.90)  m/uL


 


Hgb     (13.0-17.5)  gm/dL


 


Hct     (39.0-53.0)  %


 


Plt Count     (150-450)  k/uL


 


Neutrophils #     (1.3-7.7)  k/uL


 


Lymphocytes #     (1.0-4.8)  k/uL


 


Fibrinogen     (200-500)  mg/dL


 


D-Dimer     (<0.60)  mg/L FEU


 


ABG pH     (7.35-7.45)  


 


ABG pCO2     (35-45)  mmHg


 


ABG pO2     ()  mmHg


 


ABG HCO3     (21-25)  mmol/L


 


ABG Total CO2     (19-24)  mmol/L


 


ABG O2 Saturation     (94-97)  %


 


Sodium   136 L   (137-145)  mmol/L


 


Chloride   109 H   ()  mmol/L


 


Carbon Dioxide   16 L   (22-30)  mmol/L


 


BUN   21 H   (9-20)  mg/dL


 


Glucose   268 H   (74-99)  mg/dL


 


POC Glucose (mg/dL)     (75-99)  mg/dL


 


Calcium     (8.4-10.2)  mg/dL


 


Lactate Dehydrogenase     (313-618)  U/L


 


Troponin I     (0.000-0.034)  ng/mL


 


C-Reactive Protein     (<10.0)  mg/L


 


Total Protein     (6.3-8.2)  g/dL


 


Albumin     (3.5-5.0)  g/dL


 


TSH    0.403 L  (0.465-4.680)  mIU/L














  12/12/20 12/12/20 12/12/20 Range/Units





  12:00 14:32 14:32 


 


WBC   14.9 H   (3.8-10.6)  k/uL


 


RBC     (4.30-5.90)  m/uL


 


Hgb     (13.0-17.5)  gm/dL


 


Hct     (39.0-53.0)  %


 


Plt Count   502 H   (150-450)  k/uL


 


Neutrophils #   13.4 H   (1.3-7.7)  k/uL


 


Lymphocytes #   0.8 L   (1.0-4.8)  k/uL


 


Fibrinogen     (200-500)  mg/dL


 


D-Dimer    3.15 H  (<0.60)  mg/L FEU


 


ABG pH     (7.35-7.45)  


 


ABG pCO2     (35-45)  mmHg


 


ABG pO2     ()  mmHg


 


ABG HCO3     (21-25)  mmol/L


 


ABG Total CO2     (19-24)  mmol/L


 


ABG O2 Saturation     (94-97)  %


 


Sodium     (137-145)  mmol/L


 


Chloride     ()  mmol/L


 


Carbon Dioxide     (22-30)  mmol/L


 


BUN     (9-20)  mg/dL


 


Glucose     (74-99)  mg/dL


 


POC Glucose (mg/dL)  271 H    (75-99)  mg/dL


 


Calcium     (8.4-10.2)  mg/dL


 


Lactate Dehydrogenase     (313-618)  U/L


 


Troponin I     (0.000-0.034)  ng/mL


 


C-Reactive Protein     (<10.0)  mg/L


 


Total Protein     (6.3-8.2)  g/dL


 


Albumin     (3.5-5.0)  g/dL


 


TSH     (0.465-4.680)  mIU/L














  12/12/20 12/12/20 12/12/20 Range/Units





  14:32 14:32 14:42 


 


WBC     (3.8-10.6)  k/uL


 


RBC     (4.30-5.90)  m/uL


 


Hgb     (13.0-17.5)  gm/dL


 


Hct     (39.0-53.0)  %


 


Plt Count     (150-450)  k/uL


 


Neutrophils #     (1.3-7.7)  k/uL


 


Lymphocytes #     (1.0-4.8)  k/uL


 


Fibrinogen     (200-500)  mg/dL


 


D-Dimer     (<0.60)  mg/L FEU


 


ABG pH    7.48 H  (7.35-7.45)  


 


ABG pCO2    22 L  (35-45)  mmHg


 


ABG pO2    58 L*  ()  mmHg


 


ABG HCO3    16 L  (21-25)  mmol/L


 


ABG Total CO2    17 L  (19-24)  mmol/L


 


ABG O2 Saturation    89.4 L  (94-97)  %


 


Sodium  135 L    (137-145)  mmol/L


 


Chloride     ()  mmol/L


 


Carbon Dioxide  17 L    (22-30)  mmol/L


 


BUN  22 H    (9-20)  mg/dL


 


Glucose  274 H    (74-99)  mg/dL


 


POC Glucose (mg/dL)     (75-99)  mg/dL


 


Calcium     (8.4-10.2)  mg/dL


 


Lactate Dehydrogenase  858 H    (313-618)  U/L


 


Troponin I   0.142 H*   (0.000-0.034)  ng/mL


 


C-Reactive Protein  24.5 H    (<10.0)  mg/L


 


Total Protein     (6.3-8.2)  g/dL


 


Albumin     (3.5-5.0)  g/dL


 


TSH     (0.465-4.680)  mIU/L














  12/12/20 12/12/20 12/13/20 Range/Units





  16:34 20:00 00:00 


 


WBC     (3.8-10.6)  k/uL


 


RBC     (4.30-5.90)  m/uL


 


Hgb     (13.0-17.5)  gm/dL


 


Hct     (39.0-53.0)  %


 


Plt Count     (150-450)  k/uL


 


Neutrophils #     (1.3-7.7)  k/uL


 


Lymphocytes #     (1.0-4.8)  k/uL


 


Fibrinogen     (200-500)  mg/dL


 


D-Dimer     (<0.60)  mg/L FEU


 


ABG pH     (7.35-7.45)  


 


ABG pCO2     (35-45)  mmHg


 


ABG pO2     ()  mmHg


 


ABG HCO3     (21-25)  mmol/L


 


ABG Total CO2     (19-24)  mmol/L


 


ABG O2 Saturation     (94-97)  %


 


Sodium     (137-145)  mmol/L


 


Chloride     ()  mmol/L


 


Carbon Dioxide     (22-30)  mmol/L


 


BUN     (9-20)  mg/dL


 


Glucose     (74-99)  mg/dL


 


POC Glucose (mg/dL)  228 H  227 H  155 H  (75-99)  mg/dL


 


Calcium     (8.4-10.2)  mg/dL


 


Lactate Dehydrogenase     (313-618)  U/L


 


Troponin I     (0.000-0.034)  ng/mL


 


C-Reactive Protein     (<10.0)  mg/L


 


Total Protein     (6.3-8.2)  g/dL


 


Albumin     (3.5-5.0)  g/dL


 


TSH     (0.465-4.680)  mIU/L














  12/13/20 12/13/20 12/13/20 Range/Units





  04:13 04:13 04:13 


 


WBC  13.5 H    (3.8-10.6)  k/uL


 


RBC  4.13 L    (4.30-5.90)  m/uL


 


Hgb  12.7 L    (13.0-17.5)  gm/dL


 


Hct  38.8 L    (39.0-53.0)  %


 


Plt Count     (150-450)  k/uL


 


Neutrophils #  12.1 H    (1.3-7.7)  k/uL


 


Lymphocytes #  0.6 L    (1.0-4.8)  k/uL


 


Fibrinogen   538 H   (200-500)  mg/dL


 


D-Dimer   5.77 H   (<0.60)  mg/L FEU


 


ABG pH     (7.35-7.45)  


 


ABG pCO2     (35-45)  mmHg


 


ABG pO2     ()  mmHg


 


ABG HCO3     (21-25)  mmol/L


 


ABG Total CO2     (19-24)  mmol/L


 


ABG O2 Saturation     (94-97)  %


 


Sodium    135 L  (137-145)  mmol/L


 


Chloride    108 H  ()  mmol/L


 


Carbon Dioxide    20 L  (22-30)  mmol/L


 


BUN    25 H  (9-20)  mg/dL


 


Glucose    169 H  (74-99)  mg/dL


 


POC Glucose (mg/dL)     (75-99)  mg/dL


 


Calcium    8.3 L  (8.4-10.2)  mg/dL


 


Lactate Dehydrogenase    904 H  (313-618)  U/L


 


Troponin I     (0.000-0.034)  ng/mL


 


C-Reactive Protein    31.2 H  (<10.0)  mg/L


 


Total Protein    5.5 L  (6.3-8.2)  g/dL


 


Albumin    2.8 L  (3.5-5.0)  g/dL


 


TSH     (0.465-4.680)  mIU/L














  12/13/20 Range/Units





  07:03 


 


WBC   (3.8-10.6)  k/uL


 


RBC   (4.30-5.90)  m/uL


 


Hgb   (13.0-17.5)  gm/dL


 


Hct   (39.0-53.0)  %


 


Plt Count   (150-450)  k/uL


 


Neutrophils #   (1.3-7.7)  k/uL


 


Lymphocytes #   (1.0-4.8)  k/uL


 


Fibrinogen   (200-500)  mg/dL


 


D-Dimer   (<0.60)  mg/L FEU


 


ABG pH   (7.35-7.45)  


 


ABG pCO2   (35-45)  mmHg


 


ABG pO2   ()  mmHg


 


ABG HCO3   (21-25)  mmol/L


 


ABG Total CO2   (19-24)  mmol/L


 


ABG O2 Saturation   (94-97)  %


 


Sodium   (137-145)  mmol/L


 


Chloride   ()  mmol/L


 


Carbon Dioxide   (22-30)  mmol/L


 


BUN   (9-20)  mg/dL


 


Glucose   (74-99)  mg/dL


 


POC Glucose (mg/dL)  184 H  (75-99)  mg/dL


 


Calcium   (8.4-10.2)  mg/dL


 


Lactate Dehydrogenase   (313-618)  U/L


 


Troponin I   (0.000-0.034)  ng/mL


 


C-Reactive Protein   (<10.0)  mg/L


 


Total Protein   (6.3-8.2)  g/dL


 


Albumin   (3.5-5.0)  g/dL


 


TSH   (0.465-4.680)  mIU/L














Assessment and Plan


Plan: 











1 acute hypoxic respiratory failure with development of peripheral bilateral 

pulmonary infiltrates, most likely consistent with progression of acute 

coronavirus/Covid 19 related pneumonia.  The patient was receiving a combination

of Decadron and Remdesivir and he has completed his course and the patient 

received his day #5 yesterday.  Because of worsening shortness of breath and 

hypoxemia, increase his Decadron dose to 60 mg IV every 12 hours.  He is 

currently on a BiPAP at a pressure of 12/6 cm of water.  Blood gases is pending.

 No overt signs of heart failure.  The patient has peripheral bilateral pul

monary infiltrates which is very much consistent with coronavirus/Covid 19 

related infection..





2 new onset atrial fibrillation with rapid ventricular response currently on a 

combination of metoprolol, Cardizem drip and oral amiodarone.  Cardiology on the

case.  LV function is preserved.  The patient's heart is under slower rate at 

this point in time.





3 dyspnea secondary to above





4  right upper lobe lung nodule being followed in the outpatient setting





5 History of left upper lobe pulmonary embolism, anticoagulated with Xarelto





6 Hypertension





7 Former smoker





8 History of coronary disease





9 History of gout





10 GERD





11 troponin leak





Plan:





Continue Decadron and Remdesivir was completed yesterday.


Keep the patient on the BiPAP and obtain a blood gas and try to wean down FiO2


Give the patient dose another dose of of Lasix 40 mg IV push


Check cardiac enzymes show some mild troponin leak


Check proBNP level was elevated and this was likely due to A. fib RVR and a 

component of mild CHF.  The predominance failure however is related to Covid 19 

related pneumonia.


Keep Cardizem drip at 20 mg an hour and gradually wean it off to maintain a 

heart rate of less than 100


Keep oral beta blockers 


Continue Xarelto and d-dimer was noted.  The patient is currently taking Xarelto

20 mg once a day.  


Keep the patient ICU for now.


CC management, >30 min


Time with Patient: Greater than 30 Deep Sutures: 4-0 Monocryl

## 2022-10-27 NOTE — P.CNPUL
History of Present Illness


Consult date: 12/06/20


Requesting physician: Noelle Henderson


Reason for consult: other (CoVID 19 infection)


Chief complaint: Generalized weakness, fatigue, poor appetite


History of present illness: 





This is a very pleasant 70-year-old gentleman who follows with Dr. Garcia as 

his primary care provider.  He has a history of hypertension, gastroesophageal 

reflux disease, gout.  He was also seen in consultation by our group back in 

July 2020 for a left upper lobe pulmonary embolism.  He has been maintained on 

Xarelto.  He followed up with Dr. Samaniego in our office because there was an 

incidental finding of a right upper lobe lung mass.  PET scan had revealed some 

uptake.  Follow-up CAT scan revealed no significant change and the plan was to 

see Dr. Chowdhury in follow-up with a CAT scan in 6 months from his last office 

visit in November 2020.  Over the past week the patient had complaints of 

increasing fatigue generalized weakness, sleeping up to 20 hours per day.  Poor 

appetite.  No shortness of breath, cough or congestion.  No hemoptysis.  He 

presented here to the emergency room for the same.  He was noted to have a blood

sugar of greater than 400.  No previous history of diabetes mellitus.  No 

outpatient steroids.  He was also found to be positive for the corona virus.  We

are consulted for the same.  He is seen today in consultation on the regular 

medical floor.  He is currently resting comfortably in bed.  Awake and alert in 

no acute distress.  No shortness of breath, cough or congestion.  Chest x-ray 

revealed normal chest.  Normal heart.  No acute process.  He is maintaining O2 

saturations in the low 90s on room air.  He's afebrile.  Hemodynamically stable.

 White count 3.9.  Hemoglobin 14.1.  Lymphocytes 0.7.  Sodium 131.  Potassium 

3.9.  Creatinine 0.93.  Glucose 223.  C-reactive protein 59.  Urinalysis with 4+

glucose, 2+ ketones, acetone negative. 





Review of Systems





REVIEW OF SYSTEMS:


CONSTITUTIONAL: Positive for generalized weakness, fatigue, poor appetite. 

Denies any recent significant weight loss or weight gain.


EYES: Denies change in vision.


EARS, NOSE, MOUTH, THROAT: Denies headaches, denies sore throat.


CARDIOVASCULAR: Denies chest pain, palpitations or syncopal episodes.


RESPIRATORY: Denies shortness of breath, cough, congestion or hemoptysis.


GASTROINTESTINAL: Poor appetite, denies abdominal pain


GENITOURINARY: Denies hematuria, denies infections.


MUSKULOSKELETAL: Denies pain, denies swelling.


INTEGUMENTARY: Denies rash, denies eczema.


NEUROLOGICAL: Denies recent memory loss, no recent seizure activity. 


PSYCHIATRIC: Denies anxiety, denies depression.


HEMATOLOGIC/LYMPHATIC: Denies anemia, denies enlarged lymph nodes.








Past Medical History


Past Medical History: Hypertension, Myocardial Infarction (MI), Pulmonary 

Embolus (PE)


Additional Past Medical History / Comment(s): Gout, right upper lobe lung nodule

being monitored in the outpatient setting, left upper lobe pulmonary embolism in

July 2020, on Xarelto


Last Myocardial Infarction Date:: 7\21\2014


History of Any Multi-Drug Resistant Organisms: None Reported


Past Surgical History: Appendectomy


Additional Past Surgical History / Comment(s): pt states appendectomy 30-40 

years ago.  pt states he doesn't know when his last MI was, because he was in 

snf at the time he also said it was about 6 years ago.


Past Psychological History: No Psychological Hx Reported


Smoking Status: Former smoker


Past Alcohol Use History: None Reported


Past Drug Use History: None Reported





Medications and Allergies


                                Home Medications











 Medication  Instructions  Recorded  Confirmed  Type


 


Allopurinol [Zyloprim] 300 mg PO DAILY 07/21/20 12/05/20 History


 


amLODIPine [Norvasc] 5 mg PO BID 07/21/20 12/05/20 History


 


Metoprolol Succinate [Toprol XL] 50 mg PO DAILY 12/05/20 12/05/20 History


 


Naproxen Sodium [Naprelan] 500 mg PO BID PRN 12/05/20 12/05/20 History


 


Omeprazole 20 mg PO DAILY 12/05/20 12/05/20 History


 


Rivaroxaban [Xarelto] 20 mg PO DAILY 12/05/20 12/05/20 History








                                    Allergies











Allergy/AdvReac Type Severity Reaction Status Date / Time


 


No Known Allergies Allergy   Verified 12/05/20 15:10














Physical Exam


Vitals: 


                                   Vital Signs











  Temp Pulse Pulse Resp BP BP Pulse Ox


 


 12/06/20 09:57  99.6 F   81  18   142/83  91 L


 


 12/06/20 05:54  99.4 F   80    155/79  92 L


 


 12/06/20 02:32  98.9 F   76    150/79  93 L


 


 12/05/20 21:50  101.4 F H   85    142/79  93 L


 


 12/05/20 21:16  102.8 F H      


 


 12/05/20 21:00   87   16  137/71   92 L


 


 12/05/20 20:11     16   








                                Intake and Output











 12/06/20 12/06/20 12/06/20





 06:59 14:59 22:59


 


Other:   


 


  Weight 113.398 kg  














GENERAL EXAM: Alert, pleasant 70-year-old gentleman, on room air, comfortable in

 no apparent distress.


HEAD: Normocephalic.


EYES: Normal reaction of pupils, equal size.


NOSE: Clear with pink turbinates.


THROAT: No erythema or exudates.


NECK: No masses, no JVD.


CHEST: No chest wall deformity.


LUNGS: Equal air entry with no crackles, wheeze, rhonchi or dullness.


CVS: S1 and S2 normal with no audible murmur, regular rhythm.


ABDOMEN: No hepatosplenomegaly, normal bowel sounds, no guarding or rigidity.


SPINE: No scoliosis or deformity


SKIN: No rashes


CENTRAL NERVOUS SYSTEM: No focal deficits, tone is normal in all 4 extremities.


EXTREMITIES: There is no peripheral edema.  No clubbing, no cyanosis.  

Peripheral pulses are intact.





Results





- Laboratory Findings


CBC and BMP: 


                                 12/05/20 13:35





                                 12/05/20 13:35


PT/INR, D-dimer











PT  11.2 sec (9.0-12.0)   12/05/20  13:35    


 


INR  1.1  (<1.2)   12/05/20  13:35    








Abnormal lab findings: 


                                  Abnormal Labs











  12/05/20 12/05/20 12/05/20





  13:01 13:35 13:35


 


Lymphocytes #   0.7 L 


 


Sodium   


 


Carbon Dioxide   


 


Glucose   


 


POC Glucose (mg/dL)  343 H  


 


Calcium   


 


Magnesium   


 


C-Reactive Protein   


 


Total Protein   


 


Albumin   


 


Ur Specific Gravity    1.038 H


 


Urine Protein    1+ H


 


Urine Glucose (UA)    4+ H


 


Urine Ketones    2+ H


 


Urine Blood    Trace H


 


Urine Mucus    Rare H


 


Coronavirus (PCR)   














  12/05/20 12/05/20 12/05/20





  13:35 13:35 14:06


 


Lymphocytes #   


 


Sodium  131 L  


 


Carbon Dioxide  20 L  


 


Glucose  331 H  


 


POC Glucose (mg/dL)   


 


Calcium  7.5 L  


 


Magnesium  1.5 L  


 


C-Reactive Protein   59.0 H 


 


Total Protein  6.1 L  


 


Albumin  3.3 L  


 


Ur Specific Gravity   


 


Urine Protein   


 


Urine Glucose (UA)   


 


Urine Ketones   


 


Urine Blood   


 


Urine Mucus   


 


Coronavirus (PCR)    Detected A














  12/06/20 12/06/20





  07:19 11:47


 


Lymphocytes #  


 


Sodium  


 


Carbon Dioxide  


 


Glucose  


 


POC Glucose (mg/dL)  248 H  223 H


 


Calcium  


 


Magnesium  


 


C-Reactive Protein  


 


Total Protein  


 


Albumin  


 


Ur Specific Gravity  


 


Urine Protein  


 


Urine Glucose (UA)  


 


Urine Ketones  


 


Urine Blood  


 


Urine Mucus  


 


Coronavirus (PCR)  














Assessment and Plan


Assessment: 





1 Generalized weakness, fatigue, poor appetite secondary to new onset diabetes 

mellitus





2 Corona virus which may be contributing to some of the above





3 Febrile illness secondary to above





4 Known right upper lobe lung nodule being followed in the outpatient setting





5 History of left upper lobe pulmonary embolism, anticoagulated with Xarelto





6 Hypertension





7 Former smoker





8 History of coronary disease





9 History of gout





10 GERD





Plan:





The patient was seen and evaluated by Dr. Dr. Ruiz


No pulmonary complaints, on room air


Not a candidate for Remdesivir


No dexamethasone


Cleared for discharge from the pulmonary standpoint


Keep scheduled appointment with Dr. Samaniego and follow-up computed tomography 

scan


Education regarding new-onset diabetes, follow-up within 1-2 days with his PCP





I, the cosigning physician, performed a history & physical examination of the 

patient. Lungs sounds are clear.  Maintaining good O2 saturations in the 90s on 

room air.  I discussed the assessment and plan of care with my nurse 

practitioner, Luly Puga. I attest to the above consultation as dictated by her.





Time with Patient: Greater than 30
Detail Level: Generalized

## 2024-02-15 NOTE — P.PN
Subjective


HISTORY OF PRESENTING ILLNESS


This is a pleasant 70-year-old  male past medical history significant 

for ischemic heart disease exact details unavailable at this occurred in [place]

according to the patient he had a catheterization 6 years ago and has natural 

collateral flow no PCI required, pulmonary embolism and hypertension.  He 

follows in the office with Dr. Howard. We have been asked to see in 

consultation for atrial fibrillation with rapid ventricular rates.  Is currently

being treated for acute visit 19.  Telemetry tracings indicate he went into 

atrial fibrillation yesterday evening with heart rates as high as 160.  He has 

been initiated on IV Cardizem, IV amiodarone and oral beta blockers have been 

increased.  He had a recent echocardiogram in July of this year revealing 

preserved LV systolic function with ejection fraction 50-55%.





12/13/2020:


Patient seen and examined.  Patient did have difficult to control heart rates 

yesterday and Cardizem was increased as well as his Lopressor and he was changed

to by mouth amiodarone.  Unfortunately he did have worsened shortness breath 

with increasing oxygen requirements and therefore was transferred to ICU.  Heart

rates have been better controlled, predominantly 90s to 110s.  Patient denies 

any chest pain or pressure.  Patient was placed on BiPAP and is feeling better 

than yesterday.  He was given Lasix yesterday with approximately -1 L.  

Troponins were checked yesterday resulting at 0.142 and 0.06.  Additionally 

proBNP noted to be at 3940.





REVIEW OF SYSTEMS


At the time of my exam:


CONSTITUTIONAL: Denies fever or chills.


CARDIOVASCULAR: Denies chest pain, +shortness of breath, no orthopnea, PND or 

palpitations.


RESPIRATORY: Denies cough. 


GASTROINTESTINAL: Denies abdominal pain, diarrhea, constipation, nausea or 

vomiting.


MUSCULOSKELETAL: Denies myalgias.


NEUROLOGIC: Denies numbness, tingling or weakness.


ENDOCRINE: Denies fatigue, weight change,  polydipsia or polyurina.


GENITOURINARY: Denies burning, hematuria or urgency with micturation.


HEMATOLOGIC: Denies history of anemia or bleeding. 





PHYSICAL EXAMINATION


Blood pressure 96/73 heart rate 103 afebrile and maintaining oxygen saturation 

on BiPAP.


CONSTITUTIONAL: No apparent distress, comfortable on BiPAP. 


HEENT: Head is normocephalic. Pupils are equal, round. Sclerae anicteric. Mucous

membranes of the mouth are moist.  No JVD. No carotid bruit.


CHEST EXAMINATION: Coarse breath sounds bilaterally with rhonchi 


HEART EXAMINATION: Irregular rate and rhythm. S1, S2 heard. No murmurs, gallops 

or rub.


ABDOMEN: Soft, nontender. Positive bowel sounds.


EXTREMITIES: 2+ peripheral pulses, no lower extremity edema and no calf 

tenderness.


NEUROLOGIC EXAMINATION: Patient is awake, alert and oriented x3. 





ASSESSMENT


New onset paroxysmal atrial fibrillation with rapid ventricular rate


Acute Covid 19 infection


New-onset diabetes mellitus


History of pulmonary embolism maintained on Xarelto


Hypertension


Mildly elevated troponins, likely type II mechanism secondary to A. fib with RVR

and Covid infection.  No chest pain and do not suspect acute coronary syndrome


Acute on chronic respiratory failure.  Likely predominantly due to Covid 19 

pneumonia however proBNP noted to be elevated and may be a component of acute 

diastolic heart failure.





PLAN


Continue xarelto for thromboembolic protection. 


Continue with Cardizem drip at 20, metoprolol 75 mg 3 times a day and 

amiodarone.  Heart rates appears somewhat better controlled.


Give cardizem 10 mg IVP now and increase infusion to 20 mg. 


Chest x-ray reviewed and continues bilateral diffuse infiltrates, no significant

effusion.  Agree with attempted diuresis given high oxygen requirements and 

possible mild heart failure.  


Continue supportive care.


Hopeful change of Cardizem drip to by mouth medications however patient has been

somewhat difficult to control heart rates.  Continue current regimen at this 

time.








Objective





- Vital Signs


Vital signs: 


                                   Vital Signs











Temp  97.0 F L  12/13/20 12:00


 


Pulse  103 H  12/13/20 14:00


 


Resp  22   12/13/20 14:00


 


BP  96/73   12/13/20 14:00


 


Pulse Ox  93 L  12/13/20 14:00








                                 Intake & Output











 12/12/20 12/13/20 12/13/20





 18:59 06:59 18:59


 


Intake Total 1059.833 778.666 575


 


Output Total 2100 725 1500


 


Balance -1040.167 53.666 -925


 


Weight   92.5 kg


 


Intake:   


 


  IV  300 400


 


    Sodium Chloride 0.9% 1,  300 400





    000 ml @ 50 mls/hr IV .   





    Q20H [facility] Rx#:[id number]   


 


  Intake, IV Titration 179.833 238.666 175





  Amount   


 


    Diltiazem 125 mg In 179.833 238.666 125





    Sodium Chloride 0.9% 100   





    ml @ 20 MG/HR 20 mls/hr   





    IV .Q6H15M [facility] Rx#:   





    [id number]   


 


    cefTRIAXone 1 gm In   50





    Sodium Chloride 0.9% 50   





    ml @ 100 mls/hr IVPB   





    Q24HR [facility] Rx#:[id number]   


 


  Oral 880 240 


 


Output:   


 


  Urine 2100 725 1500


 


Other:   


 


  Voiding Method Toilet Urinal Urinal


 


  # Voids  1 


 


  # Bowel Movements 1  














- Labs


CBC & Chem 7: 


                                 12/13/20 04:13





                                 12/13/20 04:13


Labs: 


                  Abnormal Lab Results - Last 24 Hours (Table)











  12/12/20 12/12/20 12/12/20 Range/Units





  14:32 14:32 14:32 


 


WBC  14.9 H    (3.8-10.6)  k/uL


 


RBC     (4.30-5.90)  m/uL


 


Hgb     (13.0-17.5)  gm/dL


 


Hct     (39.0-53.0)  %


 


Plt Count  502 H    (150-450)  k/uL


 


Neutrophils #  13.4 H    (1.3-7.7)  k/uL


 


Lymphocytes #  0.8 L    (1.0-4.8)  k/uL


 


Fibrinogen     (200-500)  mg/dL


 


D-Dimer   3.15 H   (<0.60)  mg/L FEU


 


ABG pH     (7.35-7.45)  


 


ABG pCO2     (35-45)  mmHg


 


ABG pO2     ()  mmHg


 


ABG HCO3     (21-25)  mmol/L


 


ABG Total CO2     (19-24)  mmol/L


 


ABG O2 Saturation     (94-97)  %


 


Sodium    135 L  (137-145)  mmol/L


 


Chloride     ()  mmol/L


 


Carbon Dioxide    17 L  (22-30)  mmol/L


 


BUN    22 H  (9-20)  mg/dL


 


Glucose    274 H  (74-99)  mg/dL


 


POC Glucose (mg/dL)     (75-99)  mg/dL


 


Calcium     (8.4-10.2)  mg/dL


 


Ferritin     (22.0-322.0)  ng/mL


 


Lactate Dehydrogenase    858 H  (313-618)  U/L


 


Troponin I     (0.000-0.034)  ng/mL


 


C-Reactive Protein    24.5 H  (<10.0)  mg/L


 


Total Protein     (6.3-8.2)  g/dL


 


Albumin     (3.5-5.0)  g/dL














  12/12/20 12/12/20 12/12/20 Range/Units





  14:32 14:42 16:34 


 


WBC     (3.8-10.6)  k/uL


 


RBC     (4.30-5.90)  m/uL


 


Hgb     (13.0-17.5)  gm/dL


 


Hct     (39.0-53.0)  %


 


Plt Count     (150-450)  k/uL


 


Neutrophils #     (1.3-7.7)  k/uL


 


Lymphocytes #     (1.0-4.8)  k/uL


 


Fibrinogen     (200-500)  mg/dL


 


D-Dimer     (<0.60)  mg/L FEU


 


ABG pH   7.48 H   (7.35-7.45)  


 


ABG pCO2   22 L   (35-45)  mmHg


 


ABG pO2   58 L*   ()  mmHg


 


ABG HCO3   16 L   (21-25)  mmol/L


 


ABG Total CO2   17 L   (19-24)  mmol/L


 


ABG O2 Saturation   89.4 L   (94-97)  %


 


Sodium     (137-145)  mmol/L


 


Chloride     ()  mmol/L


 


Carbon Dioxide     (22-30)  mmol/L


 


BUN     (9-20)  mg/dL


 


Glucose     (74-99)  mg/dL


 


POC Glucose (mg/dL)    228 H  (75-99)  mg/dL


 


Calcium     (8.4-10.2)  mg/dL


 


Ferritin     (22.0-322.0)  ng/mL


 


Lactate Dehydrogenase     (313-618)  U/L


 


Troponin I  0.142 H*    (0.000-0.034)  ng/mL


 


C-Reactive Protein     (<10.0)  mg/L


 


Total Protein     (6.3-8.2)  g/dL


 


Albumin     (3.5-5.0)  g/dL














  12/12/20 12/13/20 12/13/20 Range/Units





  20:00 00:00 04:13 


 


WBC    13.5 H  (3.8-10.6)  k/uL


 


RBC    4.13 L  (4.30-5.90)  m/uL


 


Hgb    12.7 L  (13.0-17.5)  gm/dL


 


Hct    38.8 L  (39.0-53.0)  %


 


Plt Count     (150-450)  k/uL


 


Neutrophils #    12.1 H  (1.3-7.7)  k/uL


 


Lymphocytes #    0.6 L  (1.0-4.8)  k/uL


 


Fibrinogen     (200-500)  mg/dL


 


D-Dimer     (<0.60)  mg/L FEU


 


ABG pH     (7.35-7.45)  


 


ABG pCO2     (35-45)  mmHg


 


ABG pO2     ()  mmHg


 


ABG HCO3     (21-25)  mmol/L


 


ABG Total CO2     (19-24)  mmol/L


 


ABG O2 Saturation     (94-97)  %


 


Sodium     (137-145)  mmol/L


 


Chloride     ()  mmol/L


 


Carbon Dioxide     (22-30)  mmol/L


 


BUN     (9-20)  mg/dL


 


Glucose     (74-99)  mg/dL


 


POC Glucose (mg/dL)  227 H  155 H   (75-99)  mg/dL


 


Calcium     (8.4-10.2)  mg/dL


 


Ferritin     (22.0-322.0)  ng/mL


 


Lactate Dehydrogenase     (313-618)  U/L


 


Troponin I     (0.000-0.034)  ng/mL


 


C-Reactive Protein     (<10.0)  mg/L


 


Total Protein     (6.3-8.2)  g/dL


 


Albumin     (3.5-5.0)  g/dL














  12/13/20 12/13/20 12/13/20 Range/Units





  04:13 04:13 07:03 


 


WBC     (3.8-10.6)  k/uL


 


RBC     (4.30-5.90)  m/uL


 


Hgb     (13.0-17.5)  gm/dL


 


Hct     (39.0-53.0)  %


 


Plt Count     (150-450)  k/uL


 


Neutrophils #     (1.3-7.7)  k/uL


 


Lymphocytes #     (1.0-4.8)  k/uL


 


Fibrinogen  538 H    (200-500)  mg/dL


 


D-Dimer  5.77 H    (<0.60)  mg/L FEU


 


ABG pH     (7.35-7.45)  


 


ABG pCO2     (35-45)  mmHg


 


ABG pO2     ()  mmHg


 


ABG HCO3     (21-25)  mmol/L


 


ABG Total CO2     (19-24)  mmol/L


 


ABG O2 Saturation     (94-97)  %


 


Sodium   135 L   (137-145)  mmol/L


 


Chloride   108 H   ()  mmol/L


 


Carbon Dioxide   20 L   (22-30)  mmol/L


 


BUN   25 H   (9-20)  mg/dL


 


Glucose   169 H   (74-99)  mg/dL


 


POC Glucose (mg/dL)    184 H  (75-99)  mg/dL


 


Calcium   8.3 L   (8.4-10.2)  mg/dL


 


Ferritin   639.9 H   (22.0-322.0)  ng/mL


 


Lactate Dehydrogenase   904 H   (313-618)  U/L


 


Troponin I     (0.000-0.034)  ng/mL


 


C-Reactive Protein   31.2 H   (<10.0)  mg/L


 


Total Protein   5.5 L   (6.3-8.2)  g/dL


 


Albumin   2.8 L   (3.5-5.0)  g/dL














  12/13/20 12/13/20 12/13/20 Range/Units





  08:34 08:40 11:38 


 


WBC     (3.8-10.6)  k/uL


 


RBC     (4.30-5.90)  m/uL


 


Hgb     (13.0-17.5)  gm/dL


 


Hct     (39.0-53.0)  %


 


Plt Count     (150-450)  k/uL


 


Neutrophils #     (1.3-7.7)  k/uL


 


Lymphocytes #     (1.0-4.8)  k/uL


 


Fibrinogen     (200-500)  mg/dL


 


D-Dimer     (<0.60)  mg/L FEU


 


ABG pH     (7.35-7.45)  


 


ABG pCO2  27 L    (35-45)  mmHg


 


ABG pO2  77 L    ()  mmHg


 


ABG HCO3  18 L    (21-25)  mmol/L


 


ABG Total CO2     (19-24)  mmol/L


 


ABG O2 Saturation  93.8 L    (94-97)  %


 


Sodium     (137-145)  mmol/L


 


Chloride     ()  mmol/L


 


Carbon Dioxide     (22-30)  mmol/L


 


BUN     (9-20)  mg/dL


 


Glucose     (74-99)  mg/dL


 


POC Glucose (mg/dL)    171 H  (75-99)  mg/dL


 


Calcium     (8.4-10.2)  mg/dL


 


Ferritin     (22.0-322.0)  ng/mL


 


Lactate Dehydrogenase     (313-618)  U/L


 


Troponin I   0.066 H*   (0.000-0.034)  ng/mL


 


C-Reactive Protein     (<10.0)  mg/L


 


Total Protein     (6.3-8.2)  g/dL


 


Albumin     (3.5-5.0)  g/dL thigh